# Patient Record
Sex: FEMALE | Race: WHITE | NOT HISPANIC OR LATINO | Employment: FULL TIME | URBAN - METROPOLITAN AREA
[De-identification: names, ages, dates, MRNs, and addresses within clinical notes are randomized per-mention and may not be internally consistent; named-entity substitution may affect disease eponyms.]

---

## 2017-06-15 DIAGNOSIS — Z12.31 ENCOUNTER FOR SCREENING MAMMOGRAM FOR MALIGNANT NEOPLASM OF BREAST: ICD-10-CM

## 2018-01-09 ENCOUNTER — OFFICE VISIT (OUTPATIENT)
Dept: URGENT CARE | Facility: CLINIC | Age: 53
End: 2018-01-09
Payer: COMMERCIAL

## 2018-01-09 ENCOUNTER — APPOINTMENT (OUTPATIENT)
Dept: RADIOLOGY | Facility: CLINIC | Age: 53
End: 2018-01-09
Payer: COMMERCIAL

## 2018-01-09 ENCOUNTER — TRANSCRIBE ORDERS (OUTPATIENT)
Dept: URGENT CARE | Facility: CLINIC | Age: 53
End: 2018-01-09

## 2018-01-09 DIAGNOSIS — M25.572 PAIN IN LEFT ANKLE: ICD-10-CM

## 2018-01-09 DIAGNOSIS — S99.912A INJURY OF LEFT ANKLE: ICD-10-CM

## 2018-01-09 DIAGNOSIS — S93.492A SPRAIN OF OTHER LIGAMENT OF LEFT ANKLE, INITIAL ENCOUNTER: ICD-10-CM

## 2018-01-09 PROCEDURE — 73610 X-RAY EXAM OF ANKLE: CPT

## 2018-01-09 PROCEDURE — 73630 X-RAY EXAM OF FOOT: CPT

## 2018-01-09 PROCEDURE — 99213 OFFICE O/P EST LOW 20 MIN: CPT

## 2018-01-09 PROCEDURE — 99214 OFFICE O/P EST MOD 30 MIN: CPT

## 2018-01-11 NOTE — PROGRESS NOTES
Assessment   1  Left ankle pain (871 93) (V19 412)    Plan   Injury of left ankle, initial encounter    · * XR ANKLE 3+ VIEW LEFT; Status:Active - Retrospective By Protocol Authorization; Requested TTY:[id number];    · * XR FOOT 3+ VIEW LEFT; Status:Active - Retrospective By Protocol Authorization; Requested PTH:[id number]; Discussion/Summary   Discussion Summary:    Unable to complete xray at this time due to computer issue will send to [facility]  Medication Side Effects Reviewed: Possible side effects of new medications were reviewed with the patient/guardian today  Understands and agrees with treatment plan: The treatment plan was reviewed with the patient/guardian  The patient/guardian understands and agrees with the treatment plan    Counseling Documentation With Imm: The patient was counseled regarding instructions for management,-- patient and family education,-- importance of compliance with treatment  total time of encounter was 25 minutes-- and-- 10 minutes was spent counseling  Follow Up Instructions: Follow Up with your Primary Care Provider in 3-5 days  If your symptoms worsen, go to the nearest [facility] Emergency Department  Chief Complaint   1  Ankle Pain  Chief Complaint Free Text Note Form: Left ankle pain swelling in bruising for 2 weeks hit off power wheel chair while at work has been icing mMotrin and elevating ice makes worse now using heat  History of Present Illness   HPI: 47year old female at urgent care with chief complaint of left ankle and left lateral foot pain for the past 2 weeks since hitting it on a power wheelchair  She has been using NSAID some relief obtained    Hospital Based Practices Required Assessment:      Pain Assessment      the patient states they have pain  The pain is located in the left ankle foot   The patient describes the pain as aching  (on a scale of 0 to 10, the patient rates the pain at 8 )      Abuse And Domestic Violence Screen       Yes, the patient is safe at home  -- The patient states no one is hurting them  Depression And Suicide Screen  No, the patient has not had thoughts of hurting themself  No, the patient has not felt depressed in the past 7 days  Prefered Language is  [place]  Primary Language is  English  Readiness To Learn: Receptive  Barriers To Learning: none  Preferred Learning: verbal      Education Completed: disease/condition,-- medications-- and-- further treatment/follow-up      Teaching Method: verbal      Person Taught: patient      Evaluation Of Learning: verbalized/demonstrated understanding    Ankle Pain: [facility]  presents with complaints of ankle pain  Associated symptoms include swelling,-- decreased range of motion,-- difficulty bearing weight-- and-- lateral foot pain, but-- no redness,-- no warmth,-- no localized bruising,-- no instability,-- no stiffness,-- no fever,-- no chills,-- no localized rash,-- no generalized rash-- and-- no pain in other joints  Review of Systems   Focused-Female:      Constitutional: No fever, no chills, feels well, no tiredness, no recent weight gain or loss  ENT: no ear ache, no loss of hearing, no nosebleeds or nasal discharge, no sore throat or hoarseness  Cardiovascular: no complaints of slow or fast heart rate, no chest pain, no palpitations, no leg claudication or lower extremity edema  Respiratory: no complaints of shortness of breath, no wheezing, no dyspnea on exertion, no orthopnea or PND  Breasts: no complaints of breast pain, breast lump or nipple discharge  Gastrointestinal: no complaints of abdominal pain, no constipation, no nausea or diarrhea, no vomiting, no bloody stools  Genitourinary: no complaints of dysuria, no incontinence, no pelvic pain, no dysmenorrhea, no vaginal discharge or abnormal vaginal bleeding        Musculoskeletal: no complaints of arthralgia, no myalgia, no joint swelling or stiffness, no limb pain or swelling  Integumentary: no complaints of skin rash or lesion, no itching or dry skin, no skin wounds  Neurological: no complaints of headache, no confusion, no numbness or tingling, no dizziness or fainting  ROS Reviewed:    ROS reviewed  Active Problems   1  Benign essential hypertension (401 1) (I10)   2  Conjunctivitis (372 30) (H10 9)   3  Degenerative arthritis of cervical spine (721 0) (M47 812)   4  Dog bite of hand, left, subsequent encounter (V58 89,882 0) (E21 501A,W99  0XXD)   5  Dog bite of right hand, subsequent encounter (V58 89,882 0) (O85 182H,K86  0XXD)   6  Dog bite, initial encounter (879 8,E906 0) (W54  0XXA)   7  Encounter for screening mammogram for breast cancer (V76 12) (Z12 31)   8  Fracture of right scapular body (811 09) (S42 111A)   9  Frequent headaches (784 0) (R51)   10  H/O: Bell's palsy (V12 49) (Z86 69)   11  Hyperesthesia (782 0) (R20 3)   12  Hyperglycemia (790 29) (R73 9)   13  Hypothyroidism (244 9) (E03 9)   14  Lung nodule (793 11) (R91 1)   15  Middle ear effusion, bilateral (381 4) (H65 93)   16  Migraine headache (346 90) (G43 909)   17  Multiple rib fractures (807 09) (S22 49XA)   18  Nasal congestion (478 19) (R09 81)   19  Obstructive sleep apnea (327 23) (G47 33)   20  Paresthesias (782 0) (R20 2)   21  Paroxysmal atrial fibrillation (427 31) (I48 0)   22  Recurrent epistaxis (784 7) (R04 0)   23  Visit for screening mammogram (V76 12) (Z12 31)    Past Medical History   1  History of Endometrial adenocarcinoma (182 0) (C54 1)   2  History of Endometriosis (617 9) (N80 9)   3  History of Facial twitching (351 8) (G51 4)   4  History of arthritis (V13 4) (Z87 39)   5  History of migraine (V12 49) (Z86 69)   6  History of thyroid disease (V12 29) (Z86 39)  Active Problems And Past Medical History Reviewed: The active problems and past medical history were reviewed and updated today        Family History   Father    1  Family history of Diabetes mellitus (250 00) (E11 9)   2  Family history of hypertension (V17 49) (Z82 49)  Family History    3  Family history of hypotension (V17 49) (Z82 49)  Family History Reviewed: The family history was reviewed and updated today  Social History    · Caffeine use (V49 89) (F15 90)   · Former smoker (E81 78) (E77 278)   ·    · No drug use   · Social alcohol use (Z78 9)   · Three children  Social History Reviewed: The social history was reviewed and updated today  The social history was reviewed and is unchanged  Surgical History   1  History of Ankle Surgery   2  History of Appendectomy   3  History of Cholecystotomy   4  History of Hysterectomy   5  History of Hysteroscopy   6  History of Oophorectomy  Surgical History Reviewed: The surgical history was reviewed and updated today  Current Meds    1  Calcium CAPS; Therapy: (Recorded:99Ejs6804) to Recorded   2  CoQ-10 200 MG CAPS; Take as directed Recorded   3  2016 [employer] CAPS Recorded   4  Magnesium CAPS; Therapy: (Recorded:15Lng3081) to Recorded   5  Multi-Vitamin Oral Tablet Recorded  Medication List Reviewed: The medication list was reviewed and updated today  Allergies   1  No Known Drug Allergies  2  Seasonal    Vitals   Signs   Recorded: 57EHD2950 06:01PM   Temperature: 98 7 F  Heart Rate: 76  Respiration: 20  O2 Saturation: 97  Pain Scale: 8    Physical Exam        Constitutional      General appearance: No acute distress, well appearing and well nourished  Eyes      Conjunctiva and lids: No swelling, erythema or discharge  Pupils and irises: Equal, round and reactive to light  Ears, Nose, Mouth, and Throat      External inspection of ears and nose: Normal        Oropharynx: Normal with no erythema, edema, exudate or lesions  Pulmonary      Respiratory effort: No increased work of breathing or signs of respiratory distress         Auscultation of lungs: Clear to auscultation  Cardiovascular      Palpation of heart: Normal PMI, no thrills  Auscultation of heart: Normal rate and rhythm, normal S1 and S2, without murmurs  Musculoskeletal      Gait and station: Normal        Digits and nails: Normal without clubbing or cyanosis  Inspection/palpation of joints, bones, and muscles: Abnormal   Appearance - normal  Palpation - normal except as noted: left ankle-- and-- left foot tenderness  Results/Data   Diagnostic Studies Reviewed: I personally reviewed the films/images/results in the office today  My interpretation follows  X-ray Review left foot and ankle xray        Signatures    Electronically signed by : Yemi Handy NP; Jan 9 2018  6:23PM EST                       (Author)     Electronically signed by : KRYSTA العراقي ; Colton 10 2018  8:52AM EST                       (Co-author)

## 2018-01-12 ENCOUNTER — GENERIC CONVERSION - ENCOUNTER (OUTPATIENT)
Dept: OTHER | Facility: OTHER | Age: 53
End: 2018-01-12

## 2018-01-12 NOTE — MISCELLANEOUS
Assessment    1  Multiple rib fractures (807 09) (S22 49XA)   2  Fracture of right scapular body (811 09) (S42 111A)   3  Lung nodule (793 11) (R91 1)   4  Degenerative arthritis of cervical spine (721 0) (A03 119)    Discussion/Summary  Discussion Summary:   Reviewed hospital records with her  Discussed with her the need for repeat CT of the chest in about 3 months to follow the lung nodule  Discussed with her the degenerative changes that were seen in the cervical spine  Discussed with her that there was some enlargement of the heart on the CT of the chest  Continue with the tramadol as needed  Continue with ibuprofen or other anti-inflammatory's as needed  Continue with the Lidoderm patches as needed  Discussed with her that she will likely have some significant pain at least for the next 3-4 weeks  Discussed with her that she may be limited from overworking standpoint for the next 1-2 months especially since she has relatively active job  Family medical leave forms will be completed for the patient  Discussed with her following up more regularly for her routine medical problems  Discussed with her setting up a routine visit and getting routine laboratory testing done after these acute issues have resolved  Discussed with her that the haziness that was seen on the CAT scan of the chest in her lung fields  She states that she has had low pulse oxes for many years especially when sleeping  Discussed with her further evaluation with regard to this  We'll set her up for recheck in about 6 weeks or sooner if needed  Discussed with her that she should also set up an appointment after that for a routine visit with laboratory testing  Advised her to watch for any worsening of her pain in the rib area or any increase in the shortness of breath  Discussed with her that if her pain or shortness of breath increased significantly, she should go immediately to the emergency room        Chief Complaint  Chief Complaint Free Text Note Form: Pt here for [facility] from [facility] for a fall from a horse  Pt states she is still having some pain but taking pain meds and using her patches  History of Present Illness  TCM Communication [facility]: The patient is being contacted for follow-up after hospitalization and 6/6/16  She was hospitalized at [facility]  The dates of hospitalization: 5/29/16-5/30/16, date of admission: 5/29/16, date of discharge: 5/30/16  Diagnosis: Closed FX of Multiple Ribs R Side Closed Nondisplaced FX of R scapula, Fall from horse  She was discharged to home  Medications reviewed and updated today  Follow-up appointments with other specialists: None  The patient is currently experiencing symptoms  pain r side of body Counseling was provided to the patient  Communication performed and completed by Krystal Hendricks   HPI: She presents for follow-up after recent hospitalization for observation after fall from her horse with multiple rib fractures and other injuries  She states that she was riding her horse and fell off landing on her right side  She landed on her right side slightly injuring her arm but hitting her rib cage and back area as well as hitting her head  No loss of consciousness  She noticed immediately that she could not take a deep breath and had significant pain into the chest wall area  She went to the emergency room and was evaluated for potential traumatic injuries  She had a CT of her chest as well as a CT of her neck  Chest CT showed right sided rib fractures which were nondisplaced at sixth, seventh and eighth ribs  These were nondisplaced and no underlying lung injury or pneumothorax was noticed  There was some haziness in the lung fields which was nonspecific per the radiology report  There was a 4 mm right middle lobe pulmonary nodule as well as mild cardiomegaly, diverticulosis, and small hiatal hernia  On the CT of her neck degenerative changes were found but no acute injuries   She was observed overnight and was discharged  Has been taking tramadol only as needed  Has been taking ibuprofen otherwise  Denies any significant shortness of breath  Is trying to limit her activity somewhat to prevent the pain  Denies any headaches  Denies any localized weakness  Has been applying Lidoderm patches to the area of the right rib fractures with some alleviation of her symptoms  On the CAT scan of her chest, fracture to the edge of the right scapula was also noted  Review of Systems  Complete-Female:   Constitutional: feeling tired  Cardiovascular: as noted in HPI  Respiratory: as noted in HPI  Gastrointestinal: as noted in HPI  Genitourinary: as noted in HPI and no dysuria  Musculoskeletal: as noted in HPI  Neurological: as noted in HPI  ROS Reviewed:   ROS reviewed  Active Problems    1  Benign essential hypertension (401 1) (I10)   2  Frequent headaches (784 0) (R51)   3  H/O: Bell's palsy (V12 49) (Z86 69)   4  Hyperesthesia (782 0) (R20 3)   5  Hyperglycemia (790 29) (R73 9)   6  Hypothyroidism (244 9) (E03 9)   7  Migraine headache (346 90) (G43 909)   8  Obstructive sleep apnea (327 23) (G47 33)   9  Paresthesias (782 0) (R20 2)   10  Paroxysmal atrial fibrillation (427 31) (I48 0)   11  Recurrent epistaxis (784 7) (R04 0)   12  Visit for screening mammogram (V76 12) (Z12 31)    Past Medical History    1  History of Endometrial adenocarcinoma (182 0) (C54 1)   2  History of Endometriosis (617 9) (N80 9)   3  History of Facial twitching (351 8) (G51 4)   4  History of arthritis (V13 4) (Z87 39)   5  History of migraine (V12 49) (Z86 69)   6  History of thyroid disease (V12 29) (Z86 39)    Surgical History    1  History of Ankle Surgery   2  History of Appendectomy   3  History of Cholecystotomy   4  History of Hysterectomy   5  History of Hysteroscopy  Surgical History Reviewed: The surgical history was reviewed and updated today  Family History  Father    1   Family history of Diabetes mellitus (250 00) (E11 9)   2  Family history of hypertension (V17 49) (Z82 49)  Family History    3  Family history of hypotension (V17 49) (Z82 49)  Family History Reviewed: The family history was reviewed and updated today  Social History    · Caffeine use (V49 89) (F15 90)   · Former smoker (F56 46) (V82 092)   ·    · No drug use   · Social alcohol use (Z78 9)   · Three children  Social History Reviewed: The social history was reviewed and updated today  Current Meds   1  Calcium CAPS; Therapy: (Recorded:16Dqt3166) to Recorded   2  CoQ-10 200 MG Oral Capsule; Take as directed Recorded   3  Cyclobenzaprine HCl - 10 MG Oral Tablet; Take 1 tablet 3 times a day as needed for   muscle spasms for up to 30 days; Therapy: (Recorded:14Mgs2280) to Recorded   4   MG TABS; TAKE 1 TABLET EVERY 6 HOURS as needed for mild pain up to 10   days; Therapy: (Recorded:15Abo8621) to Recorded   5 [address] CAPS Recorded   6  Lidocaine 5 % External Patch; Place 2 patches on the skin daily for 30 days; Therapy: (Recorded:62Ous9338) to Recorded   7  Magnesium CAPS; Therapy: (Recorded:14Iqk6243) to Recorded   8  Multi-Vitamin Oral Tablet Recorded   9  TraMADol HCl - 50 MG Oral Tablet; TAKE 2 TABLETS 4 TIMES DAILY; Therapy: (Recorded:30Vex2644) to Recorded  Medication List Reviewed: The medication list was reviewed and updated today  Allergies    1  No Known Drug Allergies    2   Seasonal    Vitals  Vitals [Data Includes: All]   Recorded: 41WCU0411 14:78QB   Systolic: 236  Diastolic: 88  Heart Rate: 88  Respiration: 16  Height: 5 ft 6 in  Weight: 259 lb   BMI Calculated: 41 8  BSA Calculated: 2 23  Pain Scale: 5  Recorded: 31ZFC3326 46:93AD   Systolic: 990  Diastolic: 82  Heart Rate: 66  Respiration: 16  Height: 5 ft 6 in  Weight: 266 lb   BMI Calculated: 42 93  BSA Calculated: 2 26  Pain Scale: 0  Recorded: 61ZRO9767 35:94ZQ   Systolic: 007, RUE, Sitting  Diastolic: 93, RUE, Sitting  Heart Rate: 55  Height: 5 ft 6 in  Weight: 273 lb   BMI Calculated: 44 06  BSA Calculated: 2 28  Recorded: 54QGO1244 30:21BV   Systolic: 110  Diastolic: 94  Heart Rate: 60  Respiration: 18  Height: 5 ft 6 in  Weight: 278 lb   BMI Calculated: 44 87  BSA Calculated: 2 31  Pain Scale: 2    Physical Exam    Constitutional   General appearance: Abnormal   appears tired and well hydrated  Ears, Nose, Mouth, and Throat   External inspection of ears and nose: Normal     Otoscopic examination: Tympanic membranes translucent with normal light reflex  Canals patent without erythema  Oropharynx: Normal with no erythema, edema, exudate or lesions  Pulmonary   Auscultation of lungs: Clear to auscultation  Cardiovascular   Palpation of heart: Abnormal   tenderness over right lateral rib area;  Auscultation of heart: Normal rate and rhythm, normal S1 and S2, without murmurs  Lymphatic   Palpation of lymph nodes in neck: No lymphadenopathy  Musculoskeletal   Gait and station: Normal     Inspection/palpation of joints, bones, and muscles: Abnormal   tenderness over the right upper back area  Psychiatric   Mood and affect: Normal          Future Appointments    Date/Time Provider Specialty Site   07/13/2016 10:00 AM Velma Spurling, M D [address]   10/04/2016 11:00 AM Velma Spurling, M D [address]     Signatures   Electronically signed by : SIRISHA Henry ; Jun 7 2016  4:23AM EST                       (Author)

## 2018-01-13 NOTE — PROGRESS NOTES
Assessment   1  Injury of left ankle, initial encounter (959 7) (K05 219A)    Plan    * XR ANKLE 3+ VIEW LEFT; Status:Canceled - Retrospective By Protocol Authorization;      Perform:[facility]; GKS:[id number]; Last Updated By:Niesha Contreras; 1/10/2018 8:45:13 AM;Ordered; Christian Sebastians;       For:Injury of left ankle, initial encounter; Ordered By:Natalia Lew;      * XR FOOT 3+ VIEW LEFT; Status:Canceled - Retrospective By Protocol Authorization;      Perform:[facility]; LAQ:[id number]; Last Updated By:Niesha Conterras; 1/10/2018 8:45:14 AM;Ordered; Christian Connors;       For:Injury of left ankle, initial encounter; Ordered By:Natalia Lew;      * XR ANKLE 3+ VIEW LEFT; Status:Resulted - Requires Verification,Retrospective By Protocol Authorization;   Done: [id number] 12:00AM     Due:10Jan2018; Last Updated By:Behler, Alois Acron; 1/10/2018 8:43:59 AM;Ordered; Christian Connors;       For:Left ankle pain; Ordered By:Behler, Alois Acron;      * XR FOOT 3+ VIEW LEFT; Status:Resulted - Requires Verification,Retrospective By Protocol Authorization;   Done: [id number] 12:00AM     Due:10Jan2018; Last Updated By:Behler, Alois Acron; 1/10/2018 8:45:10 AM;Ordered; Stat;       For:Left ankle pain; Ordered By:Behler, Alois Acron; Discussion/Summary   Discussion Summary:    X-ray reveals no new fracture  Will follow-up with radiologist report when available  Recommend air cast for support on the size of her ankle  Patient should follow up with Orthopedics in the next week  Medication Side Effects Reviewed: Possible side effects of new medications were reviewed with the patient/guardian today  Understands and agrees with treatment plan: The treatment plan was reviewed with the patient/guardian  The patient/guardian understands and agrees with the treatment plan      Chief Complaint   1  Ankle Pain  Chief Complaint Free Text Note Form: Pt c/o left ankle pain for two weeks  Pt reports hitting it off of a wheel chair        History of Present Illness HPI: 50-year-old female here with chief complaint left ankle and lateral foot pain  Patient states this started 2 weeks ago after hitting off the wheelchair at work  She has been taking ibuprofen and icing it but does not feel like it is helping  She is having difficulty ambulating  Patient has a history of ORIF of the left ankle and has hardware in there and she is concerned that she may have loosened screw  Review of Systems   Focused-Female:      Constitutional: No fever, no chills, feels well, no tiredness, no recent weight gain or loss  Cardiovascular: no complaints of slow or fast heart rate, no chest pain, no palpitations, no leg claudication or lower extremity edema  Respiratory: no complaints of shortness of breath, no wheezing, no dyspnea on exertion, no orthopnea or PND  Musculoskeletal: as noted in HPI  ROS Reviewed:    ROS reviewed  Active Problems   1  Benign essential hypertension (401 1) (I10)   2  Conjunctivitis (372 30) (H10 9)   3  Degenerative arthritis of cervical spine (721 0) (M47 812)   4  Dog bite of hand, left, subsequent encounter (V58 89,882 0) (B51 926X,H99  0XXD)   5  Dog bite of right hand, subsequent encounter (V58 89,882 0) (N41 242T,H21  0XXD)   6  Dog bite, initial encounter (879 8,E906 0) (W54  0XXA)   7  Encounter for screening mammogram for breast cancer (V76 12) (Z12 31)   8  Fracture of right scapular body (811 09) (S42 111A)   9  Frequent headaches (784 0) (R51)   10  H/O: Bell's palsy (V12 49) (Z86 69)   11  Hyperesthesia (782 0) (R20 3)   12  Hyperglycemia (790 29) (R73 9)   13  Hypothyroidism (244 9) (E03 9)   14  Injury of left ankle, initial encounter (959 7) (S99 912A)   15  Left ankle pain (719 47) (M25 572)   16  Lung nodule (793 11) (R91 1)   17  Middle ear effusion, bilateral (381 4) (H65 93)   18  Migraine headache (346 90) (G43 909)   19  Multiple rib fractures (807 09) (S22 49XA)   20  Nasal congestion (478 19) (D07 81)   21  Obstructive sleep apnea (327 23) (G47 33)   22  Paresthesias (782 0) (R20 2)   23  Paroxysmal atrial fibrillation (427 31) (I48 0)   24  Recurrent epistaxis (784 7) (R04 0)   25  Visit for screening mammogram (V76 12) (Z12 31)    Past Medical History   1  History of Endometrial adenocarcinoma (182 0) (C54 1)   2  History of Endometriosis (617 9) (N80 9)   3  History of Facial twitching (351 8) (G51 4)   4  History of arthritis (V13 4) (Z87 39)   5  History of migraine (V12 49) (Z86 69)   6  History of thyroid disease (V12 29) (Z86 39)  Active Problems And Past Medical History Reviewed: The active problems and past medical history were reviewed and updated today  Family History   Father    1  Family history of Diabetes mellitus (250 00) (E11 9)   2  Family history of hypertension (V17 49) (Z82 49)  Family History    3  Family history of hypotension (V17 49) (Z82 49)  Family History Reviewed: The family history was reviewed and updated today  Social History    · Caffeine use (V49 89) (F15 90)   · Former smoker (M03 98) (L48 954)   ·    · No drug use   · Social alcohol use (Z78 9)   · Three children  Social History Reviewed: The social history was reviewed and updated today  The social history was reviewed and is unchanged  Surgical History   1  History of Ankle Surgery   2  History of Appendectomy   3  History of Cholecystotomy   4  History of Hysterectomy   5  History of Hysteroscopy   6  History of Oophorectomy  Surgical History Reviewed: The surgical history was reviewed and updated today  Current Meds    1  Calcium CAPS; Therapy: (Recorded:[id number]) to Recorded   2  CoQ-10 200 MG CAPS; Take as directed Recorded   3  [address] CAPS Recorded   4  Magnesium CAPS; Therapy: (Recorded:[id number]) to Recorded   5  Multi-Vitamin Oral Tablet Recorded  Medication List Reviewed: The medication list was reviewed and updated today  Allergies   1  No Known Drug Allergies  2  Seasonal    Physical Exam        Constitutional      General appearance: No acute distress, well appearing and well nourished  Pulmonary      Respiratory effort: No increased work of breathing or signs of respiratory distress  Auscultation of lungs: Clear to auscultation  Cardiovascular      Auscultation of heart: Normal rate and rhythm, normal S1 and S2, without murmurs  Musculoskeletal      Gait and station: Abnormal   Gait evaluation demonstrated antalgia on the left  -- Left ankle with limited range of motion in all planes  Tender to palpation along medial aspect and lateral aspect of ankle  Also tender along the lateral aspect of left foot        Signatures    Electronically signed by : HUMZA Cantrell; Colton 10 2018 10:06AM EST                       (Author)     Electronically signed by : KRYSTA Antoine Sa ; Jan 12 2018  8:53AM EST                       (Co-author)

## 2018-01-14 NOTE — MISCELLANEOUS
Message  Return to work or school:   Karen Westfall is under my professional care  She was seen in my office on 3/8/16       Pt was unable to work 03/02/2016-03/07/20216  Excuse for 3/2/16, 3/3/16, 3/4/16, and 3/7/16 due to illness        Future Appointments    Signatures   Electronically signed by : SIRISHA Spear ; Mar  8 2016 10:25AM EST                       (Author)    Electronically signed by : SIRISHA Spear ; Mar  8 2016 11:53AM EST                       (Author)    Electronically signed by : SIRISHA Spear ; Mar  9 2016  5:14AM EST                       (Author)

## 2018-01-23 VITALS — OXYGEN SATURATION: 97 % | RESPIRATION RATE: 20 BRPM | HEART RATE: 76 BPM | TEMPERATURE: 98.7 F

## 2018-01-24 VITALS
WEIGHT: 272 LBS | HEIGHT: 67 IN | DIASTOLIC BLOOD PRESSURE: 93 MMHG | RESPIRATION RATE: 16 BRPM | BODY MASS INDEX: 42.69 KG/M2 | SYSTOLIC BLOOD PRESSURE: 127 MMHG | HEART RATE: 87 BPM

## 2018-02-23 ENCOUNTER — OFFICE VISIT (OUTPATIENT)
Dept: URGENT CARE | Facility: CLINIC | Age: 53
End: 2018-02-23
Payer: COMMERCIAL

## 2018-02-23 VITALS
DIASTOLIC BLOOD PRESSURE: 83 MMHG | TEMPERATURE: 97.5 F | OXYGEN SATURATION: 97 % | HEART RATE: 74 BPM | SYSTOLIC BLOOD PRESSURE: 134 MMHG

## 2018-02-23 DIAGNOSIS — J01.10 ACUTE FRONTAL SINUSITIS, RECURRENCE NOT SPECIFIED: Primary | ICD-10-CM

## 2018-02-23 PROCEDURE — 99213 OFFICE O/P EST LOW 20 MIN: CPT | Performed by: NURSE PRACTITIONER

## 2018-02-23 RX ORDER — AMOXICILLIN AND CLAVULANATE POTASSIUM 875; 125 MG/1; MG/1
1 TABLET, FILM COATED ORAL EVERY 12 HOURS SCHEDULED
Qty: 20 TABLET | Refills: 0 | Status: SHIPPED | OUTPATIENT
Start: 2018-02-23 | End: 2018-03-05

## 2018-02-23 NOTE — PATIENT INSTRUCTIONS
I have prescribed an antibiotic for the infection  Please take the antibiotic as prescribed and finish the entire prescription  I recommend that the patient takes an over the counter probiotic or eats yogurt with live cultures in it [place]) to keep good bacteria in the gut and help prevent diarrhea  Wash hands frequently to prevent the spread of infection  Can use over the counter cough and cold medications to help with symptoms  Ibuprofen and/or tylenol as needed for pain or fever  If not improving over the next 7-10 days, follow up with PCP  Rhinosinusitis   AMBULATORY CARE:   Rhinosinusitis (RS)  is inflammation of your nose and sinuses  It commonly begins as a virus, often as a common cold  Viruses usually last 7 to 10 days and do not need treatment  When the virus does not get better on its own, you may have bacterial RS  This means that bacteria have begun to grow inside your sinuses  Acute RS lasts less than 4 weeks  Chronic RS lasts 12 weeks or more  Recurrent RS is when you have 4 or more episodes of RS in one year  Your signs and symptoms  may be worse when you lie on your back or try to sleep  You may have any of the following:  · Stuffy nose and reduced sense of smell     · Runny nose with thick yellow or green mucus     · Pressure or pain on your face or a headache     · Pain in your teeth or bad breath     · Ear pain or pressure     · Fever or cough     · Tiredness  Seek care immediately if:   · You have double vision or you cannot see  · You have a stiff neck, a fever, or a bad headache  · Your eyeball bulges out or you cannot move your eye  · Your eye and eyelid are red, swollen, and painful  · You cannot open your eye  · You are more sleepy than normal, or you notice changes in your ability to think, move, or talk  · You have swelling of your forehead or scalp    Contact your healthcare provider if:   · Your symptoms are worse or do not improve after 3 to 5 days of treatment  · You have questions or concerns about your condition or care  Treatment for rhinosinusitis  may include any of the following:  · Acetaminophen  decreases pain and fever  It is available without a doctor's order  Ask how much to take and how often to take it  Follow directions  Acetaminophen can cause liver damage if not taken correctly  · NSAIDs , such as ibuprofen, help decrease swelling, pain, and fever  This medicine is available with or without a doctor's order  NSAIDs can cause stomach bleeding or kidney problems in certain people  If you take blood thinner medicine, always ask your healthcare provider if NSAIDs are safe for you  Always read the medicine label and follow directions  · Nasal steroid sprays  decrease inflammation in your nose and sinuses  · Decongestants  reduce swelling and drain mucus in the nose and sinuses  They may help you breathe easier  · Antihistamines  dry mucus in the nose and relieve sneezing  · Antibiotics  treat a bacterial infection and may be needed if your symptoms do not improve or they get worse  · Take your medicine as directed  Contact your healthcare provider if you think your medicine is not helping or if you have side effects  Tell him or her if you are allergic to any medicine  Keep a list of the medicines, vitamins, and herbs you take  Include the amounts, and when and why you take them  Bring the list or the pill bottles to follow-up visits  Carry your medicine list with you in case of an emergency  Self-care:   · Rinse your sinuses  Use a sinus rinse device to rinse your nasal passages with a saline (salt water) solution  This will help thin the mucus in your nose and rinse away pollen and dirt  It will also help reduce swelling so you can breathe normally  Ask your healthcare provider how often to do this       · Breathe in steam   Heat a bowl of water until you see steam  Lean over the bowl and make a tent over your head with a large towel  Breathe deeply for about 20 minutes  Be careful not to get too close to the steam or burn yourself  Do this 3 times a day  You can also breathe deeply when you take a hot shower  · Sleep with your head elevated  Place an extra pillow under your head before you go to sleep to help your sinuses drain  · Drink liquids as directed  Ask your healthcare provider how much liquid to drink each day and which liquids are best for you  Liquids will thin the mucus in your nose and help it drain  Avoid drinks that contain alcohol or caffeine  · Do not smoke, and avoid secondhand smoke  Nicotine and other chemicals in cigarettes and cigars can make your symptoms worse  Ask your healthcare provider for information if you currently smoke and need help to quit  E-cigarettes or smokeless tobacco still contain nicotine  Talk to your healthcare provider before you use these products  Follow up with your healthcare provider as directed: Follow up if your symptoms are worse or not better after 3 to 5 days of treatment  Write down your questions so you remember to ask them during your visits  © 2017 [facility] Information is for End User's use only and may not be sold, redistributed or otherwise used for commercial purposes  All illustrations and images included in CareNotes® are the copyrighted property of A D A M , Inc  or Luis Quinonez  The above information is an  only  It is not intended as medical advice for individual conditions or treatments  Talk to your doctor, nurse or pharmacist before following any medical regimen to see if it is safe and effective for you

## 2018-02-23 NOTE — PROGRESS NOTES
[facility]        NAME: Thuy Judge is a 47 y o  female  : 1965    MRN: [id number]  DATE: 2018  TIME: 10:49 AM    Assessment and Plan   Acute frontal sinusitis, recurrence not specified [J01 10]  1  Acute frontal sinusitis, recurrence not specified           Patient Instructions     Follow up with PCP in 3-5 days  Proceed to  ER if symptoms worsen  I have prescribed an antibiotic for the infection  Please take the antibiotic as prescribed and finish the entire prescription  I recommend that the patient takes an over the counter probiotic or eats yogurt with live cultures in it [place]) to keep good bacteria in the gut and help prevent diarrhea  Wash hands frequently to prevent the spread of infection  Can use over the counter cough and cold medications to help with symptoms  Ibuprofen and/or tylenol as needed for pain or fever  If not improving over the next 7-10 days, follow up with PCP  Chief Complaint     Chief Complaint   Patient presents with    Cold Like Symptoms     sinus and left ear pain, "My  was diagnosed with influenza B"         History of Present Illness       50-year-old female at urgent care with chief complaint of nasal congestion sinus pressure and left-sided ear pain for the past 4 days denies any fevers chills or cough she has not used any over-the-counter medications or reports no improvement in symptoms        Review of Systems   Review of Systems   Constitutional: Negative  HENT: Positive for congestion, postnasal drip, rhinorrhea and sinus pressure  Negative for dental problem, drooling, ear discharge, ear pain, facial swelling, hearing loss, mouth sores, nosebleeds, sinus pain, sneezing, sore throat, tinnitus, trouble swallowing and voice change  Eyes: Negative  Respiratory: Negative for apnea, cough, choking, chest tightness, shortness of breath, wheezing and stridor      Cardiovascular: Negative for chest pain, palpitations and leg swelling  Gastrointestinal: Negative  Negative for abdominal distention, abdominal pain, anal bleeding, blood in stool, constipation, diarrhea, nausea, rectal pain and vomiting  Endocrine: Negative  Genitourinary: Negative  Musculoskeletal: Negative  Skin: Negative  Allergic/Immunologic: Negative  Neurological: Negative  Hematological: Negative  Psychiatric/Behavioral: Negative  Current Medications       Current Outpatient Prescriptions:     KRILL OIL PO, Take 1 tablet by mouth daily, Disp: , Rfl:     Multiple Vitamin (MULTIVITAMIN) tablet, Take 1 tablet by mouth daily, Disp: , Rfl:     saccharomyces boulardii (FLORASTOR) 250 mg capsule, Take 250 mg by mouth 2 (two) times a day , Disp: , Rfl:     ofloxacin (OCUFLOX) 0 3 % ophthalmic solution, Day 1-2: 1 drop to left eye q30 min when awake  Day 3-7: 1 drop to left eye q1h when awake  Day 8-10: 1 drop to affected eye q4h when awake , Disp: 10 mL, Rfl: 0    Omega-3 Fatty Acids (FISH OIL) 1,000 mg, Take 1,000 mg by mouth daily  , Disp: , Rfl:     Prenatal Vit-Fe Fumarate-FA (M-VIT PO), Take 1 tablet by mouth daily  , Disp: , Rfl:     tobramycin (TOBREX) 0 3 % SOLN, 1 drop every 4 (four) hours while awake , Disp: , Rfl:     Vitamin D, Cholecalciferol, 1000 UNITS TABS, Take by mouth, Disp: , Rfl:     Current Allergies     Allergies as of 02/23/2018    (No Known Allergies)            The following portions of the patient's history were reviewed and updated as appropriate: allergies, current medications, past family history, past medical history, past social history, past surgical history and problem list          Objective   /83   Pulse 74   Temp 97 5 °F (36 4 °C)   SpO2 97%        Physical Exam     Physical Exam   Constitutional: She is oriented to person, place, and time  Vital signs are normal  She appears well-developed and well-nourished  HENT:   Head: Normocephalic and atraumatic     Right Ear: Hearing, tympanic membrane, external ear and ear canal normal    Left Ear: Hearing, tympanic membrane, external ear and ear canal normal    Nose: Rhinorrhea and sinus tenderness present  Right sinus exhibits frontal sinus tenderness  Left sinus exhibits frontal sinus tenderness  Mouth/Throat: Uvula is midline and mucous membranes are normal  Posterior oropharyngeal erythema present  Eyes: Conjunctivae and EOM are normal  Pupils are equal, round, and reactive to light  Neck: Trachea normal, normal range of motion and full passive range of motion without pain  Cardiovascular: Normal rate and regular rhythm  Pulmonary/Chest: Effort normal and breath sounds normal    Abdominal: Soft  Normal appearance  Musculoskeletal: Normal range of motion  Lymphadenopathy:     She has cervical adenopathy  Right cervical: Superficial cervical adenopathy present  Left cervical: Superficial cervical adenopathy present  Neurological: She is alert and oriented to person, place, and time

## 2018-06-17 ENCOUNTER — OFFICE VISIT (OUTPATIENT)
Dept: URGENT CARE | Facility: CLINIC | Age: 53
End: 2018-06-17
Payer: COMMERCIAL

## 2018-06-17 VITALS
SYSTOLIC BLOOD PRESSURE: 150 MMHG | HEART RATE: 82 BPM | TEMPERATURE: 99.2 F | OXYGEN SATURATION: 95 % | DIASTOLIC BLOOD PRESSURE: 76 MMHG | RESPIRATION RATE: 18 BRPM

## 2018-06-17 DIAGNOSIS — R10.9 FLANK PAIN: Primary | ICD-10-CM

## 2018-06-17 LAB
SL AMB  POCT GLUCOSE, UA: NORMAL
SL AMB LEUKOCYTE ESTERASE,UA: NORMAL
SL AMB POCT BILIRUBIN,UA: NORMAL
SL AMB POCT BLOOD,UA: NORMAL
SL AMB POCT CLARITY,UA: CLEAR
SL AMB POCT COLOR,UA: YELLOW
SL AMB POCT KETONES,UA: NORMAL
SL AMB POCT NITRITE,UA: NORMAL
SL AMB POCT PH,UA: 6.5
SL AMB POCT SPECIFIC GRAVITY,UA: 1.01
SL AMB POCT URINE PROTEIN: NORMAL
SL AMB POCT UROBILINOGEN: 0.2

## 2018-06-17 PROCEDURE — 99213 OFFICE O/P EST LOW 20 MIN: CPT | Performed by: PHYSICIAN ASSISTANT

## 2018-06-17 RX ORDER — ONDANSETRON 4 MG/1
4 TABLET, ORALLY DISINTEGRATING ORAL ONCE
Status: COMPLETED | OUTPATIENT
Start: 2018-06-17 | End: 2018-06-17

## 2018-06-17 RX ADMIN — ONDANSETRON 4 MG: 4 TABLET, ORALLY DISINTEGRATING ORAL at 12:36

## 2018-06-17 NOTE — PATIENT INSTRUCTIONS
Recommend patient go to the emergency room for further evaluation and workup of right flank pain  Will give her Zofran here today  She is going to go to [employer]  Need to rule out kidney stone or hydronephrosis  No blood in the urine on dip

## 2018-06-17 NOTE — PROGRESS NOTES
[facility]    NAME: Clint Frye is a 47 y o  female  : 1965    MRN: [id number]  DATE: 2018  TIME: 12:22 PM    Assessment and Plan   Flank pain [R10 9]  1  Flank pain  POCT urine dip    ondansetron (ZOFRAN-ODT) dispersible tablet 4 mg       Patient Instructions     Patient Instructions   Recommend patient go to the emergency room for further evaluation and workup of right flank pain  Will give her Zofran here today  She is going to go to [facility]  [employer]  Need to rule out kidney stone or hydronephrosis  No blood in the urine on dip  Chief Complaint     Chief Complaint   Patient presents with    Vomiting     Pt c/o vomiting and lower back pain since last night  History of Present Illness   A 40-year-old female here with complaint of right flank pain that started last night  Patient also has a low-grade temp and has been vomiting  She is unable to keep anything and feels very nauseous  No diarrhea  Pain in her right flank  Radiates around her side into her right groin area  Denies any blood in her urine  No dysuria  No urinary frequency or hesitancy  Patient has had the kidney stones before and states that she feels the same which she did last time  Review of Systems   Review of Systems   Constitutional: Positive for fever  Negative for activity change, appetite change, chills, diaphoresis, fatigue and unexpected weight change  HENT: Negative for congestion, dental problem, hearing loss, sinus pressure, sneezing, sore throat, tinnitus, trouble swallowing and voice change  Eyes: Negative for photophobia, redness and visual disturbance  Respiratory: Negative for apnea, cough, chest tightness, shortness of breath, wheezing and stridor  Cardiovascular: Negative for chest pain, palpitations and leg swelling  Gastrointestinal: Positive for nausea and vomiting   Negative for abdominal distention, abdominal pain, blood in stool, constipation and diarrhea  Endocrine: Negative for cold intolerance, heat intolerance, polydipsia, polyphagia and polyuria  Genitourinary: Positive for flank pain  Negative for difficulty urinating, dysuria, frequency, hematuria and urgency  Musculoskeletal: Negative for arthralgias, back pain, gait problem, joint swelling, myalgias, neck pain and neck stiffness  Skin: Negative for pallor, rash and wound  Neurological: Negative for dizziness, tremors, seizures, speech difficulty, weakness and headaches  Hematological: Negative for adenopathy  Does not bruise/bleed easily  Psychiatric/Behavioral: Negative for agitation, confusion, dysphoric mood and sleep disturbance  The patient is not nervous/anxious  All other systems reviewed and are negative  Current Medications     Current Outpatient Prescriptions:     KRILL OIL PO, Take 1 tablet by mouth daily, Disp: , Rfl:     Multiple Vitamin (MULTIVITAMIN) tablet, Take 1 tablet by mouth daily, Disp: , Rfl:     ofloxacin (OCUFLOX) 0 3 % ophthalmic solution, Day 1-2: 1 drop to left eye q30 min when awake  Day 3-7: 1 drop to left eye q1h when awake  Day 8-10: 1 drop to affected eye q4h when awake , Disp: 10 mL, Rfl: 0    Omega-3 Fatty Acids (FISH OIL) 1,000 mg, Take 1,000 mg by mouth daily  , Disp: , Rfl:     Prenatal Vit-Fe Fumarate-FA (M-VIT PO), Take 1 tablet by mouth daily  , Disp: , Rfl:     saccharomyces boulardii (FLORASTOR) 250 mg capsule, Take 250 mg by mouth 2 (two) times a day , Disp: , Rfl:     tobramycin (TOBREX) 0 3 % SOLN, 1 drop every 4 (four) hours while awake , Disp: , Rfl:     Vitamin D, Cholecalciferol, 1000 UNITS TABS, Take by mouth, Disp: , Rfl:     Current Facility-Administered Medications:     ondansetron (ZOFRAN-ODT) dispersible tablet 4 mg, 4 mg, Oral, Once, Betty James PA-C    Current Allergies     Allergies as of 06/17/2018    (No Known Allergies)          The following portions of the patient's history were reviewed and updated as appropriate: allergies, current medications, past family history, past medical history, past social history, past surgical history and problem list    Past Medical History:   Diagnosis Date    Hypertension      Past Surgical History:   Procedure Laterality Date    ANKLE FRACTURE SURGERY Left     ANKLE SURGERY      APPENDECTOMY      CHOLECYSTECTOMY      HYSTERECTOMY       Family History   Problem Relation Age of Onset    Clotting disorder Sister         possible, history of multiple blood clots     Medications have been verified  Objective   /76   Pulse 82   Temp 99 2 °F (37 3 °C)   Resp 18   SpO2 95%      Physical Exam   Physical Exam   Constitutional: She appears well-developed and well-nourished  No distress  HENT:   Head: Normocephalic  Right Ear: External ear normal    Left Ear: External ear normal    Nose: Nose normal    Mouth/Throat: Oropharynx is clear and moist  No oropharyngeal exudate  Neck: Normal range of motion  Neck supple  Cardiovascular: Normal rate, regular rhythm and normal heart sounds  No murmur heard  Pulmonary/Chest: Effort normal and breath sounds normal  No respiratory distress  She has no wheezes  She has no rales  Abdominal: Soft  Bowel sounds are normal  There is no tenderness  There is CVA tenderness (Right)  Musculoskeletal: Normal range of motion  Lymphadenopathy:     She has no cervical adenopathy  Skin: Skin is warm  No rash noted

## 2018-06-20 ENCOUNTER — OFFICE VISIT (OUTPATIENT)
Dept: INTERNAL MEDICINE CLINIC | Facility: CLINIC | Age: 53
End: 2018-06-20
Payer: COMMERCIAL

## 2018-06-20 VITALS
TEMPERATURE: 98.7 F | OXYGEN SATURATION: 96 % | WEIGHT: 278.3 LBS | BODY MASS INDEX: 43.59 KG/M2 | HEART RATE: 69 BPM | DIASTOLIC BLOOD PRESSURE: 88 MMHG | SYSTOLIC BLOOD PRESSURE: 130 MMHG

## 2018-06-20 DIAGNOSIS — R10.9 FLANK PAIN: Primary | ICD-10-CM

## 2018-06-20 DIAGNOSIS — M47.892 OTHER OSTEOARTHRITIS OF SPINE, CERVICAL REGION: ICD-10-CM

## 2018-06-20 DIAGNOSIS — M54.50 CHRONIC MIDLINE LOW BACK PAIN WITHOUT SCIATICA: ICD-10-CM

## 2018-06-20 DIAGNOSIS — G89.29 CHRONIC MIDLINE LOW BACK PAIN WITHOUT SCIATICA: ICD-10-CM

## 2018-06-20 PROCEDURE — 1036F TOBACCO NON-USER: CPT | Performed by: NURSE PRACTITIONER

## 2018-06-20 PROCEDURE — 99214 OFFICE O/P EST MOD 30 MIN: CPT | Performed by: NURSE PRACTITIONER

## 2018-06-20 RX ORDER — METHYLPREDNISOLONE 4 MG/1
TABLET ORAL
Qty: 21 TABLET | Refills: 0 | Status: SHIPPED | OUTPATIENT
Start: 2018-06-20 | End: 2019-05-07 | Stop reason: ALTCHOICE

## 2018-06-20 NOTE — PROGRESS NOTES
Assessment/Plan: Patient at this time is doing well and is offering no complaints of flank pain  She would like something called in for her lower back and was sent home on Tramadol and Motrin from the ER but states this is not helping her symptoms  She was called in a Medrol dose pack  She was advised to make an appointment for a routine physical exam since her last visit was in 2016  She states she will come in her week on vacation  She was instructed to continue to drink plenty of fluids  Will follow up then  No problem-specific Assessment & Plan notes found for this encounter  Problem List Items Addressed This Visit     Degenerative arthritis of cervical spine    Flank pain - Primary    Chronic midline low back pain without sciatica    Relevant Medications    Methylprednisolone 4 MG TBPK            Subjective:      Patient ID: Schuyler Sparrow is a 47 y o  female  Roxana Sheffield is here today for a UC follow up  She was seen in the UC on 6/17 for right flank pain  She was told to go to the ER for a further work up  She did have a CT scan done in the ER and a US which was all negative and she was told she most likely passed a stone  She denies any fever or flank pain but is having lower midline back pain  She is asking for either medrol or Voltaren for her lower back  She was going to PT and did have multiple XRs done showing DDD  She denies any dysuria or hematuria  She is having a cough as well for over two months and states she can not afford at this time to go for an XR  She states she is taking OTC Mucinex and allergy medications  She does not want anything else for allergies or cough at this time  She offers no other complaints at this time  The following portions of the patient's history were reviewed and updated as appropriate:   She  has a past medical history of Arthritis; Endometrial adenocarcinoma ([address] ); Endometriosis;  Facial twitching; Hypertension; Migraine; and Thyroid disease  She   Patient Active Problem List    Diagnosis Date Noted    Flank pain 06/20/2018    Chronic midline low back pain without sciatica 06/20/2018    Nasal congestion 07/06/2016    Degenerative arthritis of cervical spine 06/06/2016    Lung nodule 06/06/2016    Multiple rib fractures 06/06/2016    Closed fracture of multiple ribs of right side 05/29/2016    Closed nondisplaced fracture of right scapula 05/29/2016    Fall from horse 05/29/2016    Essential hypertension 05/29/2016    Obesity due to excess calories 05/29/2016    Pulmonary nodule 05/29/2016    Hyperesthesia 07/30/2015    Migraine headache 07/30/2015    Obstructive sleep apnea 07/30/2015    Benign essential hypertension 10/28/2014    Frequent headaches 10/28/2014    Hypothyroidism 10/28/2014    Paresthesias 10/28/2014    Paroxysmal atrial fibrillation ([facility] [address] ) 10/28/2014    Recurrent epistaxis 10/28/2014     She  has a past surgical history that includes Ankle fracture surgery (Left); Hysterectomy; Cholecystectomy; Appendectomy; Ankle surgery; Hysteroscopy; and Oophorectomy  Her family history includes Clotting disorder in her sister; Diabetes in her father; Hypertension in her father; Hypotension in her family  She  reports that she has never smoked  She has never used smokeless tobacco  She reports that she drinks alcohol  She reports that she does not use drugs  Current Outpatient Prescriptions   Medication Sig Dispense Refill    KRILL OIL PO Take 1 tablet by mouth daily      Multiple Vitamin (MULTIVITAMIN) tablet Take 1 tablet by mouth daily      ofloxacin (OCUFLOX) 0 3 % ophthalmic solution Day 1-2: 1 drop to left eye q30 min when awake  Day 3-7: 1 drop to left eye q1h when awake  Day 8-10: 1 drop to affected eye q4h when awake  10 mL 0    Omega-3 Fatty Acids (FISH OIL) 1,000 mg Take 1,000 mg by mouth daily   tobramycin (TOBREX) 0 3 % SOLN 1 drop every 4 (four) hours while awake        Vitamin D, Cholecalciferol, 1000 UNITS TABS Take by mouth      Methylprednisolone 4 MG TBPK Use as directed on package 21 tablet 0     No current facility-administered medications for this visit  Current Outpatient Prescriptions on File Prior to Visit   Medication Sig    KRILL OIL PO Take 1 tablet by mouth daily    Multiple Vitamin (MULTIVITAMIN) tablet Take 1 tablet by mouth daily    ofloxacin (OCUFLOX) 0 3 % ophthalmic solution Day 1-2: 1 drop to left eye q30 min when awake  Day 3-7: 1 drop to left eye q1h when awake  Day 8-10: 1 drop to affected eye q4h when awake   Omega-3 Fatty Acids (FISH OIL) 1,000 mg Take 1,000 mg by mouth daily   tobramycin (TOBREX) 0 3 % SOLN 1 drop every 4 (four) hours while awake   Vitamin D, Cholecalciferol, 1000 UNITS TABS Take by mouth    [DISCONTINUED] Prenatal Vit-Fe Fumarate-FA (M-VIT PO) Take 1 tablet by mouth daily   [DISCONTINUED] saccharomyces boulardii (FLORASTOR) 250 mg capsule Take 250 mg by mouth 2 (two) times a day  No current facility-administered medications on file prior to visit  She has No Known Allergies       Review of Systems   Constitutional: Negative  HENT: Negative  Eyes: Negative  Respiratory: Negative  Cardiovascular: Negative  Gastrointestinal: Negative  Endocrine: Negative  Genitourinary: Negative  Musculoskeletal: Positive for arthralgias and myalgias  Skin: Negative  Allergic/Immunologic: Negative  Neurological: Negative  Hematological: Negative  Psychiatric/Behavioral: Negative  Objective:      /88 (BP Location: Right arm, Patient Position: Sitting, Cuff Size: Large)   Pulse 69   Temp 98 7 °F (37 1 °C) (Temporal)   Wt 126 kg (278 lb 4 8 oz)   SpO2 96%   BMI 43 59 kg/m²          Physical Exam   Constitutional: She is oriented to person, place, and time  She appears well-developed and well-nourished  HENT:   Head: Normocephalic and atraumatic     Right Ear: External ear normal    Left Ear: External ear normal    Nose: Nose normal    Mouth/Throat: Oropharyngeal exudate present  Eyes: Conjunctivae and EOM are normal  Pupils are equal, round, and reactive to light  Neck: Normal range of motion  Neck supple  Cardiovascular: Normal rate, regular rhythm, normal heart sounds and intact distal pulses  Pulmonary/Chest: Effort normal and breath sounds normal    Abdominal: Soft  Bowel sounds are normal    Musculoskeletal: Normal range of motion  Neurological: She is alert and oriented to person, place, and time  She has normal reflexes  Skin: Skin is warm and dry  Psychiatric: She has a normal mood and affect   Her behavior is normal  Judgment and thought content normal

## 2018-06-28 NOTE — PROGRESS NOTES
Can you let Magali I did get her blood work back and her fasting sugar was high did she fast for the test??

## 2018-07-03 ENCOUNTER — TELEPHONE (OUTPATIENT)
Dept: INTERNAL MEDICINE CLINIC | Facility: CLINIC | Age: 53
End: 2018-07-03

## 2019-04-22 ENCOUNTER — TRANSCRIBE ORDERS (OUTPATIENT)
Dept: ADMINISTRATIVE | Facility: HOSPITAL | Age: 54
End: 2019-04-22

## 2019-04-22 ENCOUNTER — APPOINTMENT (OUTPATIENT)
Dept: RADIOLOGY | Age: 54
End: 2019-04-22
Payer: OTHER MISCELLANEOUS

## 2019-04-22 ENCOUNTER — APPOINTMENT (OUTPATIENT)
Dept: URGENT CARE | Age: 54
End: 2019-04-22
Payer: OTHER MISCELLANEOUS

## 2019-04-22 DIAGNOSIS — S83.502A SPRAIN OF CRUCIATE LIGAMENT OF LEFT KNEE, INITIAL ENCOUNTER: Primary | ICD-10-CM

## 2019-04-22 DIAGNOSIS — M25.562 LEFT KNEE PAIN, UNSPECIFIED CHRONICITY: ICD-10-CM

## 2019-04-22 DIAGNOSIS — S89.92XA INJURY OF LEFT KNEE, INITIAL ENCOUNTER: ICD-10-CM

## 2019-04-22 DIAGNOSIS — S89.92XA INJURY OF LEFT KNEE, INITIAL ENCOUNTER: Primary | ICD-10-CM

## 2019-04-22 PROCEDURE — 73564 X-RAY EXAM KNEE 4 OR MORE: CPT

## 2019-04-22 PROCEDURE — 99284 EMERGENCY DEPT VISIT MOD MDM: CPT | Performed by: PREVENTIVE MEDICINE

## 2019-04-22 PROCEDURE — G0383 LEV 4 HOSP TYPE B ED VISIT: HCPCS | Performed by: PREVENTIVE MEDICINE

## 2019-04-25 ENCOUNTER — APPOINTMENT (EMERGENCY)
Dept: RADIOLOGY | Facility: HOSPITAL | Age: 54
End: 2019-04-25
Payer: COMMERCIAL

## 2019-04-25 ENCOUNTER — HOSPITAL ENCOUNTER (EMERGENCY)
Facility: HOSPITAL | Age: 54
Discharge: HOME | End: 2019-04-25
Attending: EMERGENCY MEDICINE | Admitting: EMERGENCY MEDICINE
Payer: COMMERCIAL

## 2019-04-25 ENCOUNTER — OFFICE VISIT (OUTPATIENT)
Dept: URGENT CARE | Facility: CLINIC | Age: 54
End: 2019-04-25
Payer: COMMERCIAL

## 2019-04-25 VITALS
DIASTOLIC BLOOD PRESSURE: 59 MMHG | HEART RATE: 77 BPM | SYSTOLIC BLOOD PRESSURE: 125 MMHG | RESPIRATION RATE: 20 BRPM | OXYGEN SATURATION: 90 % | TEMPERATURE: 97.5 F | WEIGHT: 285.5 LBS | BODY MASS INDEX: 44.71 KG/M2

## 2019-04-25 VITALS
SYSTOLIC BLOOD PRESSURE: 138 MMHG | DIASTOLIC BLOOD PRESSURE: 86 MMHG | RESPIRATION RATE: 18 BRPM | OXYGEN SATURATION: 97 % | HEART RATE: 93 BPM | TEMPERATURE: 98.3 F
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
## 2019-04-25 DIAGNOSIS — S51.851A DOG BITE OF RIGHT FOREARM, INITIAL ENCOUNTER: Primary | ICD-10-CM

## 2019-04-25 PROCEDURE — 99284 EMERGENCY DEPT VISIT MOD MDM: CPT

## 2019-04-25 PROCEDURE — 73130 X-RAY EXAM OF HAND: CPT

## 2019-04-25 PROCEDURE — 99283 EMERGENCY DEPT VISIT LOW MDM: CPT | Performed by: EMERGENCY MEDICINE

## 2019-04-25 PROCEDURE — 12006 RPR S/N/A/GEN/TRK20.1-30.0CM: CPT | Performed by: EMERGENCY MEDICINE

## 2019-04-25 PROCEDURE — 99211 OFF/OP EST MAY X REQ PHY/QHP: CPT | Performed by: PHYSICIAN ASSISTANT

## 2019-04-25 PROCEDURE — 73090 X-RAY EXAM OF FOREARM: CPT

## 2019-04-25 RX ORDER — TRAMADOL HYDROCHLORIDE 50 MG/1
100 TABLET ORAL ONCE
Status: COMPLETED | OUTPATIENT
Start: 2019-04-25 | End: 2019-04-25

## 2019-04-25 RX ORDER — AMOXICILLIN AND CLAVULANATE POTASSIUM 875; 125 MG/1; MG/1
1 TABLET, FILM COATED ORAL EVERY 12 HOURS SCHEDULED
Qty: 20 TABLET | Refills: 0 | Status: SHIPPED | OUTPATIENT
Start: 2019-04-25 | End: 2019-05-05

## 2019-04-25 RX ORDER — MORPHINE SULFATE 15 MG/1
15-30 TABLET ORAL EVERY 4 HOURS PRN
Qty: 15 TABLET | Refills: 0 | Status: SHIPPED | OUTPATIENT
Start: 2019-04-25 | End: 2019-04-30

## 2019-04-25 RX ORDER — AMOXICILLIN AND CLAVULANATE POTASSIUM 875; 125 MG/1; MG/1
1 TABLET, FILM COATED ORAL ONCE
Status: COMPLETED | OUTPATIENT
Start: 2019-04-25 | End: 2019-04-25

## 2019-04-25 RX ORDER — LIDOCAINE HYDROCHLORIDE AND EPINEPHRINE 10; 10 MG/ML; UG/ML
20 INJECTION, SOLUTION INFILTRATION; PERINEURAL ONCE
Status: COMPLETED | OUTPATIENT
Start: 2019-04-25 | End: 2019-04-25

## 2019-04-25 RX ORDER — AMOXICILLIN AND CLAVULANATE POTASSIUM 875; 125 MG/1; MG/1
1 TABLET, FILM COATED ORAL EVERY 12 HOURS SCHEDULED
Qty: 20 TABLET | Refills: 0 | Status: SHIPPED | OUTPATIENT
Start: 2019-04-25 | End: 2019-04-25 | Stop reason: SDUPTHER

## 2019-04-25 RX ADMIN — AMOXICILLIN AND CLAVULANATE POTASSIUM 1 TABLET: 875; 125 TABLET, FILM COATED ORAL at 18:00

## 2019-04-25 RX ADMIN — LIDOCAINE HYDROCHLORIDE,EPINEPHRINE BITARTRATE 20 ML: 10; .01 INJECTION, SOLUTION INFILTRATION; PERINEURAL at 18:02

## 2019-04-25 RX ADMIN — LIDOCAINE HYDROCHLORIDE,EPINEPHRINE BITARTRATE 20 ML: 10; .01 INJECTION, SOLUTION INFILTRATION; PERINEURAL at 18:03

## 2019-04-25 RX ADMIN — TRAMADOL HYDROCHLORIDE 100 MG: 50 TABLET, COATED ORAL at 18:00

## 2019-04-26 ENCOUNTER — OFFICE VISIT (OUTPATIENT)
Dept: SURGERY | Facility: HOSPITAL | Age: 54
End: 2019-04-26
Payer: COMMERCIAL

## 2019-04-26 VITALS
HEIGHT: 66 IN | WEIGHT: 284 LBS | TEMPERATURE: 99.1 F | DIASTOLIC BLOOD PRESSURE: 84 MMHG | HEART RATE: 80 BPM | SYSTOLIC BLOOD PRESSURE: 143 MMHG | BODY MASS INDEX: 45.64 KG/M2
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
## 2019-04-26 DIAGNOSIS — S41.151A DOG BITE OF ARM, RIGHT, INITIAL ENCOUNTER: Primary | ICD-10-CM

## 2019-04-26 PROCEDURE — 99202 OFFICE O/P NEW SF 15 MIN: CPT | Performed by: SURGERY

## 2019-04-29 ENCOUNTER — HOSPITAL ENCOUNTER (OUTPATIENT)
Dept: MRI IMAGING | Facility: HOSPITAL | Age: 54
Discharge: HOME | End: 2019-04-29
Payer: OTHER MISCELLANEOUS

## 2019-04-29 DIAGNOSIS — S83.502A SPRAIN OF CRUCIATE LIGAMENT OF LEFT KNEE, INITIAL ENCOUNTER: ICD-10-CM

## 2019-04-29 DIAGNOSIS — M25.562 LEFT KNEE PAIN, UNSPECIFIED CHRONICITY: ICD-10-CM

## 2019-04-29 PROCEDURE — 73721 MRI JNT OF LWR EXTRE W/O DYE: CPT

## 2019-05-03 ENCOUNTER — APPOINTMENT (OUTPATIENT)
Dept: URGENT CARE | Age: 54
End: 2019-05-03
Payer: OTHER MISCELLANEOUS

## 2019-05-03 PROCEDURE — 99213 OFFICE O/P EST LOW 20 MIN: CPT | Performed by: PREVENTIVE MEDICINE

## 2019-05-07 ENCOUNTER — OFFICE VISIT (OUTPATIENT)
Dept: OBGYN CLINIC | Facility: CLINIC | Age: 54
End: 2019-05-07
Payer: OTHER MISCELLANEOUS

## 2019-05-07 ENCOUNTER — OFFICE VISIT (OUTPATIENT)
Dept: SURGERY | Facility: HOSPITAL | Age: 54
End: 2019-05-07
Payer: COMMERCIAL

## 2019-05-07 VITALS
SYSTOLIC BLOOD PRESSURE: 100 MMHG | WEIGHT: 276 LBS | HEART RATE: 77 BPM | BODY MASS INDEX: 44.36 KG/M2 | HEIGHT: 66 IN | DIASTOLIC BLOOD PRESSURE: 70 MMHG

## 2019-05-07 VITALS
WEIGHT: 276 LBS | HEART RATE: 77 BPM | HEIGHT: 66 IN | DIASTOLIC BLOOD PRESSURE: 70 MMHG | SYSTOLIC BLOOD PRESSURE: 100 MMHG | BODY MASS INDEX: 44.36 KG/M2

## 2019-05-07 DIAGNOSIS — S41.151A DOG BITE OF ARM, RIGHT, INITIAL ENCOUNTER: Primary | ICD-10-CM

## 2019-05-07 DIAGNOSIS — S83.204A COMPLEX TEAR OF MENISCUS OF LEFT KNEE, UNSPECIFIED MENISCUS, UNSPECIFIED WHETHER OLD OR CURRENT TEAR, INITIAL ENCOUNTER: Primary | ICD-10-CM
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
## 2019-05-07 PROCEDURE — 99211 OFF/OP EST MAY X REQ PHY/QHP: CPT | Performed by: SURGERY

## 2019-05-07 PROCEDURE — 99213 OFFICE O/P EST LOW 20 MIN: CPT | Performed by: ORTHOPAEDIC SURGERY

## 2019-05-07 RX ORDER — CHLORHEXIDINE GLUCONATE 4 G/100ML
SOLUTION TOPICAL DAILY PRN
Status: CANCELLED | OUTPATIENT
Start: 2019-05-07

## 2019-05-16 ENCOUNTER — APPOINTMENT (OUTPATIENT)
Dept: URGENT CARE | Age: 54
End: 2019-05-16
Payer: OTHER MISCELLANEOUS

## 2019-05-16 PROCEDURE — 99213 OFFICE O/P EST LOW 20 MIN: CPT | Performed by: PREVENTIVE MEDICINE

## 2019-05-30 ENCOUNTER — APPOINTMENT (OUTPATIENT)
Dept: LAB | Facility: HOSPITAL | Age: 54
End: 2019-05-30
Attending: ORTHOPAEDIC SURGERY
Payer: OTHER MISCELLANEOUS

## 2019-05-30 ENCOUNTER — HOSPITAL ENCOUNTER (OUTPATIENT)
Dept: NON INVASIVE DIAGNOSTICS | Facility: HOSPITAL | Age: 54
Discharge: HOME | End: 2019-05-30
Attending: ORTHOPAEDIC SURGERY
Payer: OTHER MISCELLANEOUS

## 2019-05-30 DIAGNOSIS — S83.204A COMPLEX TEAR OF MENISCUS OF LEFT KNEE, UNSPECIFIED MENISCUS, UNSPECIFIED WHETHER OLD OR CURRENT TEAR, INITIAL ENCOUNTER: ICD-10-CM

## 2019-05-30 LAB
ANION GAP SERPL CALCULATED.3IONS-SCNC: 11 MMOL/L (ref 4–13)
ATRIAL RATE: 67 BPM
BASOPHILS # BLD AUTO: 0.04 THOUSANDS/ΜL (ref 0–0.1)
BASOPHILS NFR BLD AUTO: 1 % (ref 0–1)
BUN SERPL-MCNC: 21 MG/DL (ref 5–25)
CALCIUM SERPL-MCNC: 9.5 MG/DL (ref 8.3–10.1)
CHLORIDE SERPL-SCNC: 102 MMOL/L (ref 100–108)
CO2 SERPL-SCNC: 25 MMOL/L (ref 21–32)
CREAT SERPL-MCNC: 0.74 MG/DL (ref 0.6–1.3)
EOSINOPHIL # BLD AUTO: 0.14 THOUSAND/ΜL (ref 0–0.61)
EOSINOPHIL NFR BLD AUTO: 2 % (ref 0–6)
ERYTHROCYTE [DISTWIDTH] IN BLOOD BY AUTOMATED COUNT: 13.3 % (ref 11.6–15.1)
GFR SERPL CREATININE-BSD FRML MDRD: 92 ML/MIN/1.73SQ M
GLUCOSE SERPL-MCNC: 106 MG/DL (ref 65–140)
HCT VFR BLD AUTO: 43.4 % (ref 34.8–46.1)
HGB BLD-MCNC: 14 G/DL (ref 11.5–15.4)
IMM GRANULOCYTES # BLD AUTO: 0.02 THOUSAND/UL (ref 0–0.2)
IMM GRANULOCYTES NFR BLD AUTO: 0 % (ref 0–2)
LYMPHOCYTES # BLD AUTO: 1.99 THOUSANDS/ΜL (ref 0.6–4.47)
LYMPHOCYTES NFR BLD AUTO: 30 % (ref 14–44)
MCH RBC QN AUTO: 29.9 PG (ref 26.8–34.3)
MCHC RBC AUTO-ENTMCNC: 32.3 G/DL (ref 31.4–37.4)
MCV RBC AUTO: 93 FL (ref 82–98)
MONOCYTES # BLD AUTO: 0.59 THOUSAND/ΜL (ref 0.17–1.22)
MONOCYTES NFR BLD AUTO: 9 % (ref 4–12)
NEUTROPHILS # BLD AUTO: 3.82 THOUSANDS/ΜL (ref 1.85–7.62)
NEUTS SEG NFR BLD AUTO: 58 % (ref 43–75)
NRBC BLD AUTO-RTO: 0 /100 WBCS
P AXIS: 60 DEGREES
PLATELET # BLD AUTO: 231 THOUSANDS/UL (ref 149–390)
PMV BLD AUTO: 11.9 FL (ref 8.9–12.7)
POTASSIUM SERPL-SCNC: 3.8 MMOL/L (ref 3.5–5.3)
PR INTERVAL: 148 MS
QRS AXIS: 76 DEGREES
QRSD INTERVAL: 90 MS
QT INTERVAL: 428 MS
QTC INTERVAL: 452 MS
RBC # BLD AUTO: 4.68 MILLION/UL (ref 3.81–5.12)
SODIUM SERPL-SCNC: 138 MMOL/L (ref 136–145)
T WAVE AXIS: 30 DEGREES
VENTRICULAR RATE: 67 BPM
WBC # BLD AUTO: 6.6 THOUSAND/UL (ref 4.31–10.16)

## 2019-05-30 PROCEDURE — 93005 ELECTROCARDIOGRAM TRACING: CPT

## 2019-05-30 PROCEDURE — 80048 BASIC METABOLIC PNL TOTAL CA: CPT

## 2019-05-30 PROCEDURE — 36415 COLL VENOUS BLD VENIPUNCTURE: CPT

## 2019-05-30 PROCEDURE — 93010 ELECTROCARDIOGRAM REPORT: CPT | Performed by: INTERNAL MEDICINE

## 2019-05-30 PROCEDURE — 85025 COMPLETE CBC W/AUTO DIFF WBC: CPT

## 2019-06-02 ENCOUNTER — ANESTHESIA EVENT (OUTPATIENT)
Dept: PERIOP | Facility: HOSPITAL | Age: 54
End: 2019-06-02
Payer: OTHER MISCELLANEOUS

## 2019-06-03 ENCOUNTER — ANESTHESIA (OUTPATIENT)
Dept: PERIOP | Facility: HOSPITAL | Age: 54
End: 2019-06-03
Payer: OTHER MISCELLANEOUS

## 2019-06-03 ENCOUNTER — TELEPHONE (OUTPATIENT)
Dept: OBGYN CLINIC | Facility: HOSPITAL | Age: 54
End: 2019-06-03

## 2019-06-03 ENCOUNTER — HOSPITAL ENCOUNTER (OUTPATIENT)
Facility: HOSPITAL | Age: 54
Setting detail: OUTPATIENT SURGERY
Discharge: HOME | End: 2019-06-03
Attending: ORTHOPAEDIC SURGERY | Admitting: ORTHOPAEDIC SURGERY
Payer: OTHER MISCELLANEOUS

## 2019-06-03 VITALS
RESPIRATION RATE: 20 BRPM | HEIGHT: 66 IN | DIASTOLIC BLOOD PRESSURE: 74 MMHG | WEIGHT: 270 LBS | BODY MASS INDEX: 43.39 KG/M2 | TEMPERATURE: 97.9 F | HEART RATE: 78 BPM | OXYGEN SATURATION: 96 % | SYSTOLIC BLOOD PRESSURE: 128 MMHG

## 2019-06-03 DIAGNOSIS — S83.232D COMPLEX TEAR OF MEDIAL MENISCUS OF LEFT KNEE AS CURRENT INJURY, SUBSEQUENT ENCOUNTER: Primary | ICD-10-CM

## 2019-06-03 PROCEDURE — 29876 ARTHRS KNEE SURG SYNVCT MAJ: CPT | Performed by: PHYSICIAN ASSISTANT

## 2019-06-03 PROCEDURE — 29881 ARTHRS KNE SRG MNISECTMY M/L: CPT | Performed by: ORTHOPAEDIC SURGERY

## 2019-06-03 PROCEDURE — 29881 ARTHRS KNE SRG MNISECTMY M/L: CPT | Performed by: PHYSICIAN ASSISTANT

## 2019-06-03 PROCEDURE — 29876 ARTHRS KNEE SURG SYNVCT MAJ: CPT | Performed by: ORTHOPAEDIC SURGERY

## 2019-06-03 RX ORDER — DIPHENHYDRAMINE HYDROCHLORIDE 50 MG/ML
12.5 INJECTION INTRAMUSCULAR; INTRAVENOUS ONCE AS NEEDED
Status: DISCONTINUED | OUTPATIENT
Start: 2019-06-03 | End: 2019-06-03 | Stop reason: HOSPADM

## 2019-06-03 RX ORDER — HYDROMORPHONE HCL/PF 1 MG/ML
0.5 SYRINGE (ML) INJECTION
Status: DISCONTINUED | OUTPATIENT
Start: 2019-06-03 | End: 2019-06-03 | Stop reason: HOSPADM

## 2019-06-03 RX ORDER — LIDOCAINE HYDROCHLORIDE 10 MG/ML
0.5 INJECTION, SOLUTION EPIDURAL; INFILTRATION; INTRACAUDAL; PERINEURAL ONCE AS NEEDED
Status: DISCONTINUED | OUTPATIENT
Start: 2019-06-03 | End: 2019-06-03 | Stop reason: HOSPADM

## 2019-06-03 RX ORDER — LIDOCAINE HYDROCHLORIDE 10 MG/ML
INJECTION, SOLUTION INFILTRATION; PERINEURAL AS NEEDED
Status: DISCONTINUED | OUTPATIENT
Start: 2019-06-03 | End: 2019-06-03 | Stop reason: SURG

## 2019-06-03 RX ORDER — LIDOCAINE HYDROCHLORIDE AND EPINEPHRINE 10; 10 MG/ML; UG/ML
INJECTION, SOLUTION INFILTRATION; PERINEURAL AS NEEDED
Status: DISCONTINUED | OUTPATIENT
Start: 2019-06-03 | End: 2019-06-03 | Stop reason: HOSPADM

## 2019-06-03 RX ORDER — OXYCODONE HYDROCHLORIDE AND ACETAMINOPHEN 5; 325 MG/1; MG/1
2 TABLET ORAL EVERY 4 HOURS PRN
Status: DISCONTINUED | OUTPATIENT
Start: 2019-06-03 | End: 2019-06-03

## 2019-06-03 RX ORDER — MAGNESIUM HYDROXIDE 1200 MG/15ML
LIQUID ORAL AS NEEDED
Status: DISCONTINUED | OUTPATIENT
Start: 2019-06-03 | End: 2019-06-03 | Stop reason: HOSPADM

## 2019-06-03 RX ORDER — SODIUM CHLORIDE, SODIUM LACTATE, POTASSIUM CHLORIDE, CALCIUM CHLORIDE 600; 310; 30; 20 MG/100ML; MG/100ML; MG/100ML; MG/100ML
INJECTION, SOLUTION INTRAVENOUS CONTINUOUS PRN
Status: DISCONTINUED | OUTPATIENT
Start: 2019-06-03 | End: 2019-06-03 | Stop reason: SURG

## 2019-06-03 RX ORDER — MIDAZOLAM HYDROCHLORIDE 1 MG/ML
INJECTION INTRAMUSCULAR; INTRAVENOUS AS NEEDED
Status: DISCONTINUED | OUTPATIENT
Start: 2019-06-03 | End: 2019-06-03 | Stop reason: SURG

## 2019-06-03 RX ORDER — CHLORHEXIDINE GLUCONATE 4 G/100ML
SOLUTION TOPICAL DAILY PRN
Status: DISCONTINUED | OUTPATIENT
Start: 2019-06-03 | End: 2019-06-03 | Stop reason: HOSPADM

## 2019-06-03 RX ORDER — OXYCODONE HYDROCHLORIDE AND ACETAMINOPHEN 5; 325 MG/1; MG/1
1 TABLET ORAL EVERY 8 HOURS PRN
Qty: 15 TABLET | Refills: 0 | Status: SHIPPED | OUTPATIENT
Start: 2019-06-03 | End: 2019-06-06

## 2019-06-03 RX ORDER — METOCLOPRAMIDE HYDROCHLORIDE 5 MG/ML
10 INJECTION INTRAMUSCULAR; INTRAVENOUS ONCE AS NEEDED
Status: DISCONTINUED | OUTPATIENT
Start: 2019-06-03 | End: 2019-06-03 | Stop reason: HOSPADM

## 2019-06-03 RX ORDER — ONDANSETRON 2 MG/ML
INJECTION INTRAMUSCULAR; INTRAVENOUS AS NEEDED
Status: DISCONTINUED | OUTPATIENT
Start: 2019-06-03 | End: 2019-06-03 | Stop reason: SURG

## 2019-06-03 RX ORDER — DEXAMETHASONE SODIUM PHOSPHATE 10 MG/ML
INJECTION, SOLUTION INTRAMUSCULAR; INTRAVENOUS AS NEEDED
Status: DISCONTINUED | OUTPATIENT
Start: 2019-06-03 | End: 2019-06-03 | Stop reason: SURG

## 2019-06-03 RX ORDER — FENTANYL CITRATE/PF 50 MCG/ML
50 SYRINGE (ML) INJECTION
Status: DISCONTINUED | OUTPATIENT
Start: 2019-06-03 | End: 2019-06-03 | Stop reason: HOSPADM

## 2019-06-03 RX ORDER — TRAMADOL HYDROCHLORIDE 50 MG/1
50 TABLET ORAL EVERY 8 HOURS PRN
Qty: 15 TABLET | Refills: 0 | Status: SHIPPED | OUTPATIENT
Start: 2019-06-03 | End: 2020-09-17

## 2019-06-03 RX ORDER — HYDROMORPHONE HCL/PF 1 MG/ML
0.2 SYRINGE (ML) INJECTION
Status: DISCONTINUED | OUTPATIENT
Start: 2019-06-03 | End: 2019-06-03 | Stop reason: HOSPADM

## 2019-06-03 RX ORDER — FENTANYL CITRATE 50 UG/ML
INJECTION, SOLUTION INTRAMUSCULAR; INTRAVENOUS AS NEEDED
Status: DISCONTINUED | OUTPATIENT
Start: 2019-06-03 | End: 2019-06-03 | Stop reason: SURG

## 2019-06-03 RX ORDER — ONDANSETRON 2 MG/ML
4 INJECTION INTRAMUSCULAR; INTRAVENOUS ONCE AS NEEDED
Status: DISCONTINUED | OUTPATIENT
Start: 2019-06-03 | End: 2019-06-03 | Stop reason: HOSPADM

## 2019-06-03 RX ORDER — PROPOFOL 10 MG/ML
INJECTION, EMULSION INTRAVENOUS AS NEEDED
Status: DISCONTINUED | OUTPATIENT
Start: 2019-06-03 | End: 2019-06-03 | Stop reason: SURG

## 2019-06-03 RX ADMIN — SODIUM CHLORIDE, SODIUM LACTATE, POTASSIUM CHLORIDE, AND CALCIUM CHLORIDE: .6; .31; .03; .02 INJECTION, SOLUTION INTRAVENOUS at 07:00

## 2019-06-03 RX ADMIN — FENTANYL CITRATE 25 MCG: 50 INJECTION, SOLUTION INTRAMUSCULAR; INTRAVENOUS at 07:47

## 2019-06-03 RX ADMIN — LIDOCAINE HYDROCHLORIDE 50 MG: 10 INJECTION, SOLUTION INFILTRATION; PERINEURAL at 07:31

## 2019-06-03 RX ADMIN — DEXAMETHASONE SODIUM PHOSPHATE 8 MG: 10 INJECTION, SOLUTION INTRAMUSCULAR; INTRAVENOUS at 07:40

## 2019-06-03 RX ADMIN — ONDANSETRON HYDROCHLORIDE 4 MG: 2 INJECTION, SOLUTION INTRAVENOUS at 08:05

## 2019-06-03 RX ADMIN — FENTANYL CITRATE 50 MCG: 50 INJECTION, SOLUTION INTRAMUSCULAR; INTRAVENOUS at 07:34

## 2019-06-03 RX ADMIN — MIDAZOLAM HYDROCHLORIDE 2 MG: 1 INJECTION, SOLUTION INTRAMUSCULAR; INTRAVENOUS at 07:28

## 2019-06-03 RX ADMIN — FENTANYL CITRATE 25 MCG: 50 INJECTION, SOLUTION INTRAMUSCULAR; INTRAVENOUS at 07:38

## 2019-06-03 RX ADMIN — CEFAZOLIN 3000 MG: 1 INJECTION, POWDER, FOR SOLUTION INTRAMUSCULAR; INTRAVENOUS at 07:28

## 2019-06-03 RX ADMIN — PROPOFOL 200 MG: 10 INJECTION, EMULSION INTRAVENOUS at 07:31

## 2019-06-06 ENCOUNTER — OFFICE VISIT (OUTPATIENT)
Dept: INTERNAL MEDICINE CLINIC | Facility: CLINIC | Age: 54
End: 2019-06-06
Payer: COMMERCIAL

## 2019-06-06 VITALS
SYSTOLIC BLOOD PRESSURE: 100 MMHG | BODY MASS INDEX: 45.48 KG/M2 | HEART RATE: 79 BPM | HEIGHT: 66 IN | TEMPERATURE: 99 F | DIASTOLIC BLOOD PRESSURE: 70 MMHG | OXYGEN SATURATION: 95 % | WEIGHT: 283 LBS

## 2019-06-06 DIAGNOSIS — Z13.6 SCREENING FOR CARDIOVASCULAR CONDITION: ICD-10-CM

## 2019-06-06 DIAGNOSIS — Z11.59 NEED FOR HEPATITIS C SCREENING TEST: ICD-10-CM

## 2019-06-06 DIAGNOSIS — E66.01 MORBID OBESITY WITH BMI OF 45.0-49.9, ADULT (HCC): ICD-10-CM

## 2019-06-06 DIAGNOSIS — Z85.42 HISTORY OF ENDOMETRIAL CANCER: ICD-10-CM

## 2019-06-06 DIAGNOSIS — Z13.1 SCREENING FOR DIABETES MELLITUS: ICD-10-CM

## 2019-06-06 DIAGNOSIS — R91.1 PULMONARY NODULE: ICD-10-CM

## 2019-06-06 DIAGNOSIS — E03.9 HYPOTHYROIDISM, UNSPECIFIED TYPE: Primary | ICD-10-CM

## 2019-06-06 DIAGNOSIS — Z12.31 VISIT FOR SCREENING MAMMOGRAM: ICD-10-CM
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
## 2019-06-06 PROBLEM — S41.151A DOG BITE OF ARM, RIGHT, INITIAL ENCOUNTER: Status: RESOLVED | Noted: 2019-04-26 | Resolved: 2019-06-06

## 2019-06-06 PROBLEM — S93.492A SPRAIN OF ANTERIOR TALOFIBULAR LIGAMENT OF LEFT ANKLE: Status: ACTIVE | Noted: 2018-01-12

## 2019-06-06 PROBLEM — R10.9 FLANK PAIN: Status: RESOLVED | Noted: 2018-06-20 | Resolved: 2019-06-06

## 2019-06-06 PROCEDURE — 99213 OFFICE O/P EST LOW 20 MIN: CPT | Performed by: FAMILY MEDICINE

## 2019-06-14 ENCOUNTER — OFFICE VISIT (OUTPATIENT)
Dept: OBGYN CLINIC | Facility: CLINIC | Age: 54
End: 2019-06-14

## 2019-06-14 VITALS
HEIGHT: 66 IN | SYSTOLIC BLOOD PRESSURE: 141 MMHG | BODY MASS INDEX: 45.32 KG/M2 | HEART RATE: 76 BPM | DIASTOLIC BLOOD PRESSURE: 87 MMHG | WEIGHT: 282 LBS

## 2019-06-14 DIAGNOSIS — Z98.890 STATUS POST ARTHROSCOPY OF LEFT KNEE: Primary | ICD-10-CM

## 2019-06-14 PROCEDURE — 99024 POSTOP FOLLOW-UP VISIT: CPT | Performed by: ORTHOPAEDIC SURGERY

## 2019-06-19 ENCOUNTER — HOSPITAL ENCOUNTER (OUTPATIENT)
Dept: CT IMAGING | Facility: HOSPITAL | Age: 54
Discharge: HOME | End: 2019-06-19
Payer: COMMERCIAL

## 2019-06-19 DIAGNOSIS — R91.1 PULMONARY NODULE: ICD-10-CM

## 2019-06-19 PROCEDURE — 71260 CT THORAX DX C+: CPT

## 2019-06-19 RX ADMIN — IOHEXOL 85 ML: 350 INJECTION, SOLUTION INTRAVENOUS at 10:48

## 2019-06-21 ENCOUNTER — EVALUATION (OUTPATIENT)
Dept: PHYSICAL THERAPY | Facility: CLINIC | Age: 54
End: 2019-06-21
Payer: OTHER MISCELLANEOUS

## 2019-06-21 ENCOUNTER — HOSPITAL ENCOUNTER (OUTPATIENT)
Dept: MAMMOGRAPHY | Facility: HOSPITAL | Age: 54
Discharge: HOME | End: 2019-06-21
Payer: COMMERCIAL

## 2019-06-21 VITALS — WEIGHT: 282 LBS | HEIGHT: 66 IN | BODY MASS INDEX: 45.32 KG/M2

## 2019-06-21 DIAGNOSIS — M25.562 ACUTE PAIN OF LEFT KNEE: ICD-10-CM

## 2019-06-21 DIAGNOSIS — Z98.890 S/P ARTHROSCOPIC KNEE SURGERY: Primary | ICD-10-CM

## 2019-06-21 DIAGNOSIS — Z12.31 VISIT FOR SCREENING MAMMOGRAM: ICD-10-CM

## 2019-06-21 PROCEDURE — 97162 PT EVAL MOD COMPLEX 30 MIN: CPT | Performed by: PHYSICAL THERAPIST

## 2019-06-21 PROCEDURE — 77063 BREAST TOMOSYNTHESIS BI: CPT

## 2019-06-21 PROCEDURE — 97110 THERAPEUTIC EXERCISES: CPT | Performed by: PHYSICAL THERAPIST

## 2019-06-21 PROCEDURE — 77067 SCR MAMMO BI INCL CAD: CPT

## 2019-06-24 ENCOUNTER — OFFICE VISIT (OUTPATIENT)
Dept: PHYSICAL THERAPY | Facility: CLINIC | Age: 54
End: 2019-06-24
Payer: OTHER MISCELLANEOUS

## 2019-06-24 DIAGNOSIS — R92.8 ABNORMAL MAMMOGRAM OF RIGHT BREAST: Primary | ICD-10-CM

## 2019-06-24 DIAGNOSIS — Z98.890 S/P ARTHROSCOPIC KNEE SURGERY: Primary | ICD-10-CM

## 2019-06-24 PROCEDURE — 97140 MANUAL THERAPY 1/> REGIONS: CPT

## 2019-06-24 PROCEDURE — 97110 THERAPEUTIC EXERCISES: CPT

## 2019-06-26 ENCOUNTER — OFFICE VISIT (OUTPATIENT)
Dept: PHYSICAL THERAPY | Facility: CLINIC | Age: 54
End: 2019-06-26
Payer: OTHER MISCELLANEOUS

## 2019-06-26 DIAGNOSIS — Z98.890 S/P ARTHROSCOPIC KNEE SURGERY: Primary | ICD-10-CM

## 2019-06-26 DIAGNOSIS — M25.562 ACUTE PAIN OF LEFT KNEE: ICD-10-CM

## 2019-06-26 PROCEDURE — 97112 NEUROMUSCULAR REEDUCATION: CPT | Performed by: PHYSICAL THERAPIST

## 2019-06-26 PROCEDURE — 97110 THERAPEUTIC EXERCISES: CPT | Performed by: PHYSICAL THERAPIST

## 2019-06-27 ENCOUNTER — OFFICE VISIT (OUTPATIENT)
Dept: PHYSICAL THERAPY | Facility: CLINIC | Age: 54
End: 2019-06-27
Payer: OTHER MISCELLANEOUS

## 2019-06-27 DIAGNOSIS — Z98.890 S/P ARTHROSCOPIC KNEE SURGERY: Primary | ICD-10-CM

## 2019-06-27 DIAGNOSIS — M25.562 ACUTE PAIN OF LEFT KNEE: ICD-10-CM

## 2019-06-27 PROCEDURE — 97110 THERAPEUTIC EXERCISES: CPT | Performed by: PHYSICAL THERAPIST

## 2019-06-27 PROCEDURE — 97140 MANUAL THERAPY 1/> REGIONS: CPT | Performed by: PHYSICAL THERAPIST

## 2019-07-17 ENCOUNTER — OFFICE VISIT (OUTPATIENT)
Dept: OBGYN CLINIC | Facility: CLINIC | Age: 54
End: 2019-07-17

## 2019-07-17 VITALS
WEIGHT: 283 LBS | HEART RATE: 64 BPM | HEIGHT: 66 IN | DIASTOLIC BLOOD PRESSURE: 73 MMHG | SYSTOLIC BLOOD PRESSURE: 115 MMHG | BODY MASS INDEX: 45.48 KG/M2

## 2019-07-17 DIAGNOSIS — Z98.890 STATUS POST ARTHROSCOPY OF LEFT KNEE: Primary | ICD-10-CM

## 2019-07-17 PROCEDURE — 99024 POSTOP FOLLOW-UP VISIT: CPT | Performed by: ORTHOPAEDIC SURGERY

## 2019-07-17 NOTE — PROGRESS NOTES
Chief Complaint   arthroscopic debridement left knee    History Of Presenting Illness  Pau Quinonesgo 1965 presents with  Left knee arthroscopy and debridement on June 3rd, 2019  Patient presents for postop evaluation  Patient pleased with the outcome so far  Left knee is relatively asymptomatic      Current Medications  Current Outpatient Medications   Medication Sig Dispense Refill    KRILL OIL PO Take 1 tablet by mouth daily      Multiple Vitamin (MULTIVITAMIN) tablet Take 1 tablet by mouth daily      Omega-3 Fatty Acids (FISH OIL) 1,000 mg Take 1,000 mg by mouth daily   traMADol (ULTRAM) 50 mg tablet Take 1 tablet (50 mg total) by mouth every 8 (eight) hours as needed for moderate pain for up to 5 doses 15 tablet 0    Vitamin D, Cholecalciferol, 1000 UNITS TABS Take by mouth       No current facility-administered medications for this visit          Current Problems    Active Problems:   Patient Active Problem List    Diagnosis Date Noted    History of endometrial cancer 06/06/2019    Screening for cardiovascular condition 06/06/2019    Screening for diabetes mellitus 06/06/2019    Visit for screening mammogram 06/06/2019    Need for hepatitis C screening test 06/06/2019    Complex tear of meniscus of left knee 05/07/2019    Chronic midline low back pain without sciatica 06/20/2018    Sprain of anterior talofibular ligament of left ankle 01/12/2018    Nasal congestion 07/06/2016    Degenerative arthritis of cervical spine 06/06/2016    Lung nodule 06/06/2016    Obesity due to excess calories 05/29/2016    Pulmonary nodule 05/29/2016    Hyperesthesia 07/30/2015    Migraine headache 07/30/2015    Obstructive sleep apnea 07/30/2015    Frequent headaches 10/28/2014    Hypothyroidism 10/28/2014    Paresthesias 10/28/2014    Paroxysmal atrial fibrillation ([facility] [address] ) 10/28/2014         Review of Systems:    General: negative for - chills, fatigue, fever,  weight gain or weight loss  Psychological: negative for - anxiety, behavioral disorder, concentration difficulties  Ophthalmic: negative for - blurry vision, decreased vision, double vision,      Past Medical History:   Past Medical History:   Diagnosis Date    Arthritis     Endometrial adenocarcinoma ([address] )     Endometriosis     Facial twitching     06jun2016 resolved    Hypertension     Migraine     Thyroid disease        Past Surgical History:   Past Surgical History:   Procedure Laterality Date    ANKLE FRACTURE SURGERY Left     ANKLE SURGERY      APPENDECTOMY      CHOLECYSTECTOMY      HYSTERECTOMY      HYSTEROSCOPY      OOPHORECTOMY      IL KNEE SCOPE,MED/LAT MENISECTOMY Left 6/3/2019    Procedure: KNEE ARTHROSOCPY; partial medial meniscectomy and debridement, synovectomy, chondroplasty;  Surgeon: Zeinab Mckee MD;  Location: [facility];  Service: Orthopedics       Family History:  Family history reviewed and non-contributory  Family History   Problem Relation Age of Onset    Clotting disorder Sister         possible, history of multiple blood clots    Diabetes Father     Hypertension Father     Cancer Father         bladder cancer    Hypotension Family     No Known Problems Maternal Grandfather     Liver cancer Paternal Grandmother     Colon cancer Paternal Grandmother     No Known Problems Daughter     No Known Problems Maternal Grandmother     No Known Problems Daughter     Breast cancer additional onset Maternal Aunt     Bone cancer Maternal Aunt     No Known Problems Paternal Aunt        Social History:  Social History     Socioeconomic History    Marital status: /Civil Union     Spouse name: None    Number of children: 3    Years of education: None    Highest education level: None   Occupational History    None   Social Needs    Financial resource strain: None    Food insecurity:     Worry: None     Inability: None    Transportation needs:     Medical: None     Non-medical: None Tobacco Use    Smoking status: Never Smoker    Smokeless tobacco: Never Used    Tobacco comment: former smoker in all scripts   Substance and Sexual Activity    Alcohol use: Yes     Comment: OCASSIONAL    Drug use: No    Sexual activity: None   Lifestyle    Physical activity:     Days per week: None     Minutes per session: None    Stress: None   Relationships    Social connections:     Talks on phone: None     Gets together: None     Attends Taoism service: None     Active member of club or organization: None     Attends meetings of clubs or organizations: None     Relationship status: None    Intimate partner violence:     Fear of current or ex partner: None     Emotionally abused: None     Physically abused: None     Forced sexual activity: None   Other Topics Concern    None   Social History Narrative    ** Merged History Encounter **    Caffeine use                Allergies:   No Known Allergies        Physical ExaminationBP 115/73   Pulse 64   Ht 5' 6" (1 676 m)   Wt 128 kg (283 lb)   BMI 45 68 kg/m²   Gen: Alert and oriented to person, place, time  HEENT: EOMI, eyes clear, moist mucus membranes, hearing intact      Orthopedic Exam   left knee examination   All portal sites well healed, no evidence of infection,   excellent range of pain-free motion no joint line tenderness, calf soft non -tender, no distal deficits          Impression   stable status post left knee arthroscopic debridement        Plan     patient will continue home exercise program ice, use over-the-counter pain medicines as needed   patient will work on losing weight   follow-up p r n  Al Beard MD        Portions of the record may have been created with voice recognition software  Occasional wrong word or "sound a like" substitutions may have occurred due to the inherent limitations of voice recognition software  Read the chart carefully and recognize, using context, where substitutions have occurred

## 2019-07-17 NOTE — PATIENT INSTRUCTIONS
Knee Exercises   WHAT YOU NEED TO KNOW:   What do I need to know about knee exercises? Knee exercises help strengthen the muscles around your knee  Strong muscles can help reduce pain and decrease your risk of future injury  Knee exercises also help you heal after an injury or surgery  · Start slow  These are beginning exercises  Ask your healthcare provider if you need to see a physical therapist for more advanced exercises  As you get stronger, you may be able to do more sets of each exercise or add weights  · Stop if you feel pain  It is normal to feel some discomfort at first  Regular exercise will help decrease your discomfort over time  · Do the exercises on both legs  Do this so both knees remain strong  · Warm up before you do knee exercises  Walk or ride a stationary bike for 5 or 10 minutes to warm your muscles  How do I perform knee stretches safely? Always stretch before you do strengthening exercises  Do these stretching exercises again after you do the strengthening exercises  Do these stretches 4 or 5 days a week, or as directed  · Standing calf stretch: Face a wall and place both palms flat on the wall, or hold the back of a chair for balance  Keep a slight bend in your knees  Take a big step backward with one leg  Keep your other leg directly under you  Keep both heels flat and press your hips forward  Hold the stretch for 30 seconds, and then relax for 30 seconds  Switch legs  Repeat 2 or 3 times on each leg  · Standing quadriceps stretch:  Stand and place one hand against a wall or hold the back of a chair for balance  With your weight on one leg, bend your other leg and grab your ankle  Bring your heel toward your buttocks  Hold the stretch for 30 to 60 seconds  Switch legs  Repeat 2 or 3 times on each leg  · Sitting hamstring stretch:  Sit with both legs straight in front of you  Do not point or flex your toes   Place your palms on the floor and slide your hands forward until you feel the stretch  Do not round your back  Hold the stretch for 30 seconds  Repeat 2 or 3 times  How do I perform knee strengthening exercises safely? Do these exercises 4 or 5 days a week, or as directed  · Standing half squats:  Stand with your feet shoulder-width apart  Lean your back against a wall or hold the back of a chair for balance, if needed  Slowly sit down about 10 inches, as if you are going to sit in a chair  Your body weight should be mostly over your heels  Hold the squat for 5 seconds, then rise to a standing position  Do 3 sets of 10 squats to strengthen your buttocks and thighs  · Standing hamstring curls: Face a wall and place both palms flat on the wall, or hold the back of a chair for balance  With your weight on one leg, lift your other foot as close to your buttocks as you can  Hold for 5 seconds and then lower your leg  Do 2 sets of 10 curls on each leg  This exercise strengthens the muscles in the back of your thigh  · Standing calf raises:  Face a wall and place both palms flat on the wall, or hold the back of a chair for balance  Stand up straight, and do not lean  Place all your weight on one leg by lifting the other foot off the floor  Raise the heel of the foot that is on the floor as high as you can and then lower it  Do 2 sets of 10 calf raises on each leg to strengthen your calf muscles  · Straight leg lifts:  Lie on your stomach with straight legs  Fold your arms in front of you and rest your head in your arms  Tighten your leg muscles and raise one leg as high as you can  Hold for 5 seconds, then lower your leg  Do 2 sets of 10 lifts on each leg to strengthen your buttocks  · Sitting leg lifts:  Sit in a chair  Slowly straighten and raise one leg  Squeeze your thigh muscles and hold for 5 seconds  Relax and return your foot to the floor  Do 2 sets of 10 lifts on each leg   This helps strengthen the muscles in the front of your thigh  When should I contact my healthcare provider? · You have new pain or your pain becomes worse  · You have questions or concerns about your condition or care  CARE AGREEMENT:   You have the right to help plan your care  Learn about your health condition and how it may be treated  Discuss treatment options with your caregivers to decide what care you want to receive  You always have the right to refuse treatment  The above information is an  only  It is not intended as medical advice for individual conditions or treatments  Talk to your doctor, nurse or pharmacist before following any medical regimen to see if it is safe and effective for you  © 2017 [address] [facility] Information is for End User's use only and may not be sold, redistributed or otherwise used for commercial purposes  All illustrations and images included in CareNotes® are the copyrighted property of A D [employer] M [employer]  or Luis Quinonez

## 2019-07-18 ENCOUNTER — HOSPITAL ENCOUNTER (OUTPATIENT)
Dept: ULTRASOUND IMAGING | Facility: HOSPITAL | Age: 54
Discharge: HOME | End: 2019-07-18

## 2019-07-18 ENCOUNTER — HOSPITAL ENCOUNTER (OUTPATIENT)
Dept: MAMMOGRAPHY | Facility: HOSPITAL | Age: 54
Discharge: HOME | End: 2019-07-18
Payer: COMMERCIAL

## 2019-07-18 VITALS — WEIGHT: 283 LBS | HEIGHT: 66 IN | BODY MASS INDEX: 45.48 KG/M2

## 2019-07-18 DIAGNOSIS — R92.8 ABNORMAL MAMMOGRAM OF RIGHT BREAST: ICD-10-CM

## 2019-07-18 PROCEDURE — G0279 TOMOSYNTHESIS, MAMMO: HCPCS

## 2019-07-18 PROCEDURE — 77065 DX MAMMO INCL CAD UNI: CPT

## 2020-03-01 ENCOUNTER — OFFICE VISIT (OUTPATIENT)
Dept: URGENT CARE | Facility: CLINIC | Age: 55
End: 2020-03-01
Payer: COMMERCIAL

## 2020-03-01 VITALS
SYSTOLIC BLOOD PRESSURE: 126 MMHG | HEIGHT: 66 IN | WEIGHT: 283 LBS | DIASTOLIC BLOOD PRESSURE: 80 MMHG | RESPIRATION RATE: 18 BRPM | BODY MASS INDEX: 45.48 KG/M2 | HEART RATE: 81 BPM | TEMPERATURE: 98 F | OXYGEN SATURATION: 99 %

## 2020-03-01 DIAGNOSIS — J20.9 ACUTE BRONCHITIS, UNSPECIFIED ORGANISM: Primary | ICD-10-CM

## 2020-03-01 PROCEDURE — 99213 OFFICE O/P EST LOW 20 MIN: CPT | Performed by: NURSE PRACTITIONER

## 2020-03-01 PROCEDURE — 94640 AIRWAY INHALATION TREATMENT: CPT | Performed by: NURSE PRACTITIONER

## 2020-03-01 RX ORDER — ALBUTEROL SULFATE 90 UG/1
2 AEROSOL, METERED RESPIRATORY (INHALATION) EVERY 4 HOURS PRN
Qty: 8.5 G | Refills: 1 | Status: SHIPPED | OUTPATIENT
Start: 2020-03-01 | End: 2020-03-31

## 2020-03-01 RX ORDER — BENZONATATE 100 MG/1
CAPSULE ORAL
Qty: 30 CAPSULE | Refills: 0 | Status: SHIPPED | OUTPATIENT
Start: 2020-03-01 | End: 2020-09-17

## 2020-03-01 RX ORDER — ALBUTEROL SULFATE 2.5 MG/3ML
2.5 SOLUTION RESPIRATORY (INHALATION) ONCE
Status: COMPLETED | OUTPATIENT
Start: 2020-03-01 | End: 2020-03-01

## 2020-03-01 RX ORDER — AZITHROMYCIN 250 MG/1
TABLET, FILM COATED ORAL
Qty: 6 TABLET | Refills: 0 | Status: SHIPPED | OUTPATIENT
Start: 2020-03-01 | End: 2020-03-05

## 2020-03-01 RX ORDER — PREDNISONE 50 MG/1
50 TABLET ORAL DAILY
Qty: 5 TABLET | Refills: 0 | Status: SHIPPED | OUTPATIENT
Start: 2020-03-01 | End: 2020-03-06

## 2020-03-01 RX ADMIN — ALBUTEROL SULFATE 2.5 MG: 2.5 SOLUTION RESPIRATORY (INHALATION) at 19:46

## 2020-03-02 NOTE — PROGRESS NOTES
[facility]        NAME: Naty Holt is a 53 y o  female  : 1965    MRN: [id number]  DATE: 2020  TIME: 9:19 PM    Assessment and Plan   Acute bronchitis, unspecified organism [J20 9]  1  Acute bronchitis, unspecified organism  azithromycin (ZITHROMAX) 250 mg tablet    predniSONE 50 mg tablet    albuterol (ProAir HFA) 90 mcg/act inhaler    benzonatate (TESSALON PERLES) 100 mg capsule    albuterol inhalation solution 2 5 mg    predniSONE tablet 50 mg    Mini neb     Mini neb  Performed by: LOVE Rod  Authorized by: LOVE Rod     Number of treatments:  1  Treatment 1:   Pre-Procedure     Symptoms:  Wheezing, shortness of breath and cough    RR:  22    Medication Administered:  Albuterol 2 5 mg  Post-Procedure     Symptoms:  Wheezing and cough    RR:  18  Treatment 2:   Pre-Procedure     HR:  80  Post-procedure     HR:  90; regular  Nebulizer Comments:  Lungs tight with moderate coarse expiratory and a few inspiratory wheezes throughout initially  After nebulizer treatment significant improvement in air movement with only mild to moderate expiratory wheezes remaining  Overall patient tolerated the treatment well but does note a slight dizzy/jittery feeling  She states that her breathing feels much better and that she has started to cough up more mucus since the treatment     (pharmacy closed, so patient will take next dose of prednisone/1st outpatient dose tomorrow)    Patient Instructions     Patient Instructions     Take the azithromycin (antibiotic) and prednisone (steroid) as ordered until completed  Use the albuterol inhaler every 4-6 hours as needed for shortness of breath, chest tightness, wheezing or persistent cough  Use the Tessalon Perles up to 3x/day as needed for cough  Acute Bronchitis   AMBULATORY CARE:   Acute bronchitis  is swelling and irritation in the air passages of your lungs   This irritation may cause you to cough or have other breathing problems  Acute bronchitis often starts because of another illness, such as a cold or the flu  The illness spreads from your nose and throat to your windpipe and airways  Bronchitis is often called a chest cold  Acute bronchitis lasts about 3 to 6 weeks and is usually not a serious illness  Your cough can last for several weeks  You may have any of the following symptoms:   · A cough with sputum that may be clear, yellow, or green    · Feeling more tired than usual, and body aches    · A fever and chills    · Wheezing when you breathe    · A tight chest or pain when you breathe or cough  Seek care immediately if:   · You cough up blood  · Your lips or fingernails turn blue  · You feel like you are not getting enough air when you breathe  Contact your healthcare provider if:   · You have a fever  · Your breathing problems do not go away or get worse  · Your cough does not get better within 4 weeks  · You have questions or concerns about your condition or care  Self-care:   · Get more rest   Rest helps your body to heal  Slowly start to do more each day  Rest when you feel it is needed  · Avoid irritants in the air  Avoid chemicals, fumes, and dust  Wear a face mask if you must work around dust or fumes  Stay inside on days when air pollution levels are high  If you have allergies, stay inside when pollen counts are high  Do not use aerosol products, such as spray-on deodorant, bug spray, and hair spray  · Do not smoke or be around others who smoke  Nicotine and other chemicals in cigarettes and cigars damages the cilia that move mucus out of your lungs  Ask your healthcare provider for information if you currently smoke and need help to quit  E-cigarettes or smokeless tobacco still contain nicotine  Talk to your healthcare provider before you use these products  · Drink liquids as directed  Liquids help keep your air passages moist and help you cough up mucus   You may need to drink more liquids when you have acute bronchitis  Ask how much liquid to drink each day and which liquids are best for you  · Use a humidifier or vaporizer  Use a cool mist humidifier or a vaporizer to increase air moisture in your home  This may make it easier for you to breathe and help decrease your cough  Prevent acute bronchitis by doing the following:   · Get the vaccinations you need  Ask your healthcare provider if you should get vaccinated against the flu or pneumonia  · Prevent the spread of germs  You can decrease your risk of acute bronchitis and other illnesses by doing the following:     [facility] your hands often with soap and water  Carry germ-killing hand lotion or gel with you  You can use the lotion or gel to clean your hands when soap and water are not available  ¨ Do not touch your eyes, nose, or mouth unless you have washed your hands first     ¨ Always cover your mouth when you cough to prevent the spread of germs  It is best to cough into a tissue or your shirt sleeve instead of into your hand  Ask those around you cover their mouths when they cough  ¨ Try to avoid people who have a cold or the flu  If you are sick, stay away from others as much as possible  Medicines: Your healthcare provider may  give you any of the following:  · Ibuprofen or acetaminophen  are medicines that help lower your fever  They are available without a doctor's order  Ask your healthcare provider which medicine is right for you  Ask how much to take and how often to take it  Follow directions  These medicines can cause stomach bleeding if not taken correctly  Ibuprofen can cause kidney damage  Do not take ibuprofen if you have kidney disease, an ulcer, or allergies to aspirin  Acetaminophen can cause liver damage  Do not take more than 4,000 milligrams in 24 hours  · Decongestants  help loosen mucus in your lungs and make it easier to cough up  This can help you breathe easier      · Cough suppressants  decrease your urge to cough  If your cough produces mucus, do not take a cough suppressant unless your healthcare provider tells you to  Your healthcare provider may suggest that you take a cough suppressant at night so you can rest     · Inhalers  may be given  Your healthcare provider may give you one or more inhalers to help you breathe easier and cough less  An inhaler gives your medicine to open your airways  Ask your healthcare provider to show you how to use your inhaler correctly  Follow up with your healthcare provider as directed:  Write down questions you have so you will remember to ask them during your follow-up visits  © 2017 [employer]  Information is for End User's use only and may not be sold, redistributed or otherwise used for commercial purposes  All illustrations and images included in CareNotes® are the copyrighted property of [employer]  or [employer]  The above information is an  only  It is not intended as medical advice for individual conditions or treatments  Talk to your doctor, nurse or pharmacist before following any medical regimen to see if it is safe and effective for you  Follow up with PCP in 3-5 days  Proceed to  ER if symptoms worsen  Chief Complaint     Chief Complaint   Patient presents with    Cough     cough, wheezing, dyspnea, sinus congestion for 1 week         History of Present Illness       Patient reports that she has been sick for at least a week, maybe longer  Her symptoms were initially in her sinuses, but she states her sinuses was better  She states that the congestion went down to her chest over the last few days and she has had shortness of breath, chest tightness wheezing and cough  She does not currently having albuterol at home  She has a remote smoking history having quit between 20 and 30 years ago, and she notes she only ever smoke about half a pack per day    She had had before bronchitis but not in a long time      Review of Systems   Review of Systems   HENT: Positive for congestion, postnasal drip, sinus pressure (mostly resolved) and sinus pain  Respiratory: Positive for cough, chest tightness, shortness of breath and wheezing  Gastrointestinal: Negative  All other systems reviewed and are negative  Current Medications       Current Outpatient Medications:     KRILL OIL PO, Take 1 tablet by mouth daily, Disp: , Rfl:     Multiple Vitamin (MULTIVITAMIN) tablet, Take 1 tablet by mouth daily, Disp: , Rfl:     Omega-3 Fatty Acids (FISH OIL) 1,000 mg, Take 1,000 mg by mouth daily  , Disp: , Rfl:     Vitamin D, Cholecalciferol, 1000 UNITS TABS, Take by mouth, Disp: , Rfl:     albuterol (ProAir HFA) 90 mcg/act inhaler, Inhale 2 puffs every 4 (four) hours as needed for wheezing or shortness of breath, Disp: 8 5 g, Rfl: 1    azithromycin (ZITHROMAX) 250 mg tablet, Take 2 tablets today then 1 tablet daily x 4 days, Disp: 6 tablet, Rfl: 0    benzonatate (TESSALON PERLES) 100 mg capsule, 1-2 caps by mouth every 8 hours as needed for cough, Disp: 30 capsule, Rfl: 0    predniSONE 50 mg tablet, Take 1 tablet (50 mg total) by mouth daily for 5 days, Disp: 5 tablet, Rfl: 0    traMADol (ULTRAM) 50 mg tablet, Take 1 tablet (50 mg total) by mouth every 8 (eight) hours as needed for moderate pain for up to 5 doses (Patient not taking: Reported on 3/1/2020), Disp: 15 tablet, Rfl: 0  No current facility-administered medications for this visit       Current Allergies     Allergies as of 03/01/2020    (No Known Allergies)            The following portions of the patient's history were reviewed and updated as appropriate: allergies, current medications, past family history, past medical history, past social history, past surgical history and problem list      Past Medical History:   Diagnosis Date    Arthritis     Endometrial adenocarcinoma ([facility] [address] )     Endometriosis     Facial twitching     [id number] resolved    Hypertension     Migraine     Obesity     Thyroid disease        Past Surgical History:   Procedure Laterality Date    ANKLE FRACTURE SURGERY Left     ANKLE SURGERY      APPENDECTOMY      CHOLECYSTECTOMY      HYSTERECTOMY      HYSTEROSCOPY      OOPHORECTOMY      VT KNEE SCOPE,MED/LAT MENISECTOMY Left 6/3/2019    Procedure: KNEE ARTHROSOCPY; partial medial meniscectomy and debridement, synovectomy, chondroplasty;  Surgeon: Jagdish Levy MD;  Location: [facility];  Service: Orthopedics       Family History   Problem Relation Age of Onset    Clotting disorder Sister         possible, history of multiple blood clots    Diabetes Father     Hypertension Father     Cancer Father         bladder cancer    Hypotension Family     No Known Problems Maternal Grandfather     Liver cancer Paternal Grandmother     Colon cancer Paternal Grandmother     No Known Problems Daughter     No Known Problems Maternal Grandmother     No Known Problems Daughter     Breast cancer additional onset Maternal Aunt     Bone cancer Maternal Aunt     No Known Problems Paternal Aunt          Medications have been verified  Objective   /80   Pulse 81   Temp 98 °F (36 7 °C) (Tympanic)   Resp 18   Ht 5' 6" (1 676 m)   Wt 128 kg (283 lb)   SpO2 99%   BMI 45 68 kg/m²        Physical Exam     Physical Exam   Constitutional: She is oriented to person, place, and time  She appears well-developed and well-nourished  No distress  HENT:   Head: Normocephalic and atraumatic  Right Ear: Hearing, tympanic membrane, external ear and ear canal normal    Left Ear: Hearing, tympanic membrane, external ear and ear canal normal    Mouth/Throat: Uvula is midline, oropharynx is clear and moist and mucous membranes are normal  No posterior oropharyngeal erythema or tonsillar abscesses  No tonsillar exudate  Eyes: Pupils are equal, round, and reactive to light     Neck: Normal range of motion  Neck supple  Cardiovascular: Normal rate, regular rhythm and normal heart sounds  Pulmonary/Chest: Effort normal  No accessory muscle usage or stridor  No apnea, no tachypnea and no bradypnea  No respiratory distress  She has decreased breath sounds  She has wheezes  She has no rhonchi  She has no rales  Lungs tight with moderate coarse expiratory and a few inspiratory wheezes throughout initially  After nebulizer treatment significant improvement in air movement with only mild to moderate expiratory wheezes remaining  Overall patient tolerated the treatment well but does note a slight dizzy/jittery feeling  She states that her breathing feels much better and that she has started to cough up more mucus since the treatment  Abdominal: Soft  Bowel sounds are normal  She exhibits no distension  There is no tenderness  Musculoskeletal: Normal range of motion  Neurological: She is alert and oriented to person, place, and time  Skin: Skin is warm and dry  Capillary refill takes less than 2 seconds  She is not diaphoretic  Psychiatric: She has a normal mood and affect  Her behavior is normal  Judgment and thought content normal    Nursing note and vitals reviewed

## 2020-03-02 NOTE — PATIENT INSTRUCTIONS
Take the azithromycin (antibiotic) and prednisone (steroid) as ordered until completed  Use the albuterol inhaler every 4-6 hours as needed for shortness of breath, chest tightness, wheezing or persistent cough  Use the Tessalon Perles up to 3x/day as needed for cough  Acute Bronchitis   AMBULATORY CARE:   Acute bronchitis  is swelling and irritation in the air passages of your lungs  This irritation may cause you to cough or have other breathing problems  Acute bronchitis often starts because of another illness, such as a cold or the flu  The illness spreads from your nose and throat to your windpipe and airways  Bronchitis is often called a chest cold  Acute bronchitis lasts about 3 to 6 weeks and is usually not a serious illness  Your cough can last for several weeks  You may have any of the following symptoms:   · A cough with sputum that may be clear, yellow, or green    · Feeling more tired than usual, and body aches    · A fever and chills    · Wheezing when you breathe    · A tight chest or pain when you breathe or cough  Seek care immediately if:   · You cough up blood  · Your lips or fingernails turn blue  · You feel like you are not getting enough air when you breathe  Contact your healthcare provider if:   · You have a fever  · Your breathing problems do not go away or get worse  · Your cough does not get better within 4 weeks  · You have questions or concerns about your condition or care  Self-care:   · Get more rest   Rest helps your body to heal  Slowly start to do more each day  Rest when you feel it is needed  · Avoid irritants in the air  Avoid chemicals, fumes, and dust  Wear a face mask if you must work around dust or fumes  Stay inside on days when air pollution levels are high  If you have allergies, stay inside when pollen counts are high  Do not use aerosol products, such as spray-on deodorant, bug spray, and hair spray      · Do not smoke or be around others who smoke   Nicotine and other chemicals in cigarettes and cigars damages the cilia that move mucus out of your lungs  Ask your healthcare provider for information if you currently smoke and need help to quit  E-cigarettes or smokeless tobacco still contain nicotine  Talk to your healthcare provider before you use these products  · Drink liquids as directed  Liquids help keep your air passages moist and help you cough up mucus  You may need to drink more liquids when you have acute bronchitis  Ask how much liquid to drink each day and which liquids are best for you  · Use a humidifier or vaporizer  Use a cool mist humidifier or a vaporizer to increase air moisture in your home  This may make it easier for you to breathe and help decrease your cough  Prevent acute bronchitis by doing the following:   · Get the vaccinations you need  Ask your healthcare provider if you should get vaccinated against the flu or pneumonia  · Prevent the spread of germs  You can decrease your risk of acute bronchitis and other illnesses by doing the following:     [facility] your hands often with soap and water  Carry germ-killing hand lotion or gel with you  You can use the lotion or gel to clean your hands when soap and water are not available  ¨ Do not touch your eyes, nose, or mouth unless you have washed your hands first     ¨ Always cover your mouth when you cough to prevent the spread of germs  It is best to cough into a tissue or your shirt sleeve instead of into your hand  Ask those around you cover their mouths when they cough  ¨ Try to avoid people who have a cold or the flu  If you are sick, stay away from others as much as possible  Medicines: Your healthcare provider may  give you any of the following:  · Ibuprofen or acetaminophen  are medicines that help lower your fever  They are available without a doctor's order  Ask your healthcare provider which medicine is right for you   Ask how much to take and how often to take it  Follow directions  These medicines can cause stomach bleeding if not taken correctly  Ibuprofen can cause kidney damage  Do not take ibuprofen if you have kidney disease, an ulcer, or allergies to aspirin  Acetaminophen can cause liver damage  Do not take more than 4,000 milligrams in 24 hours  · Decongestants  help loosen mucus in your lungs and make it easier to cough up  This can help you breathe easier  · Cough suppressants  decrease your urge to cough  If your cough produces mucus, do not take a cough suppressant unless your healthcare provider tells you to  Your healthcare provider may suggest that you take a cough suppressant at night so you can rest     · Inhalers  may be given  Your healthcare provider may give you one or more inhalers to help you breathe easier and cough less  An inhaler gives your medicine to open your airways  Ask your healthcare provider to show you how to use your inhaler correctly  Follow up with your healthcare provider as directed:  Write down questions you have so you will remember to ask them during your follow-up visits  © 2017 [facility] Information is for End User's use only and may not be sold, redistributed or otherwise used for commercial purposes  All illustrations and images included in CareNotes® are the copyrighted property of [employer]  or [employer]  The above information is an  only  It is not intended as medical advice for individual conditions or treatments  Talk to your doctor, nurse or pharmacist before following any medical regimen to see if it is safe and effective for you

## 2020-04-07 ENCOUNTER — OFFICE VISIT (OUTPATIENT)
Dept: URGENT CARE | Facility: CLINIC | Age: 55
End: 2020-04-07
Payer: COMMERCIAL

## 2020-04-07 ENCOUNTER — APPOINTMENT (OUTPATIENT)
Dept: RADIOLOGY | Facility: CLINIC | Age: 55
End: 2020-04-07
Payer: COMMERCIAL

## 2020-04-07 VITALS
BODY MASS INDEX: 45.48 KG/M2 | RESPIRATION RATE: 20 BRPM | SYSTOLIC BLOOD PRESSURE: 131 MMHG | TEMPERATURE: 98 F | HEART RATE: 84 BPM | OXYGEN SATURATION: 96 % | HEIGHT: 66 IN | WEIGHT: 283 LBS | DIASTOLIC BLOOD PRESSURE: 85 MMHG

## 2020-04-07 DIAGNOSIS — B96.89 ACUTE BACTERIAL BRONCHITIS: Primary | ICD-10-CM

## 2020-04-07 DIAGNOSIS — R05.9 COUGH: ICD-10-CM

## 2020-04-07 DIAGNOSIS — J20.8 ACUTE BACTERIAL BRONCHITIS: Primary | ICD-10-CM

## 2020-04-07 PROCEDURE — 71046 X-RAY EXAM CHEST 2 VIEWS: CPT

## 2020-04-07 PROCEDURE — 99213 OFFICE O/P EST LOW 20 MIN: CPT | Performed by: PHYSICIAN ASSISTANT

## 2020-04-07 RX ORDER — LEVOFLOXACIN 750 MG/1
750 TABLET ORAL EVERY 24 HOURS
Qty: 5 TABLET | Refills: 0 | Status: SHIPPED | OUTPATIENT
Start: 2020-04-07 | End: 2020-04-12

## 2020-04-07 RX ORDER — ALBUTEROL SULFATE 90 UG/1
2 AEROSOL, METERED RESPIRATORY (INHALATION) EVERY 6 HOURS PRN
Qty: 8.5 G | Refills: 0 | Status: SHIPPED | OUTPATIENT
Start: 2020-04-07 | End: 2021-03-29 | Stop reason: SDUPTHER

## 2020-04-07 RX ORDER — ALBUTEROL SULFATE 90 UG/1
2 AEROSOL, METERED RESPIRATORY (INHALATION) EVERY 6 HOURS PRN
COMMUNITY

## 2020-04-30 ENCOUNTER — APPOINTMENT (OUTPATIENT)
Dept: RADIOLOGY | Age: 55
End: 2020-04-30
Payer: OTHER MISCELLANEOUS

## 2020-04-30 ENCOUNTER — APPOINTMENT (OUTPATIENT)
Dept: URGENT CARE | Age: 55
End: 2020-04-30
Payer: OTHER MISCELLANEOUS

## 2020-04-30 DIAGNOSIS — S49.92XA INJURY OF LEFT SHOULDER, INITIAL ENCOUNTER: ICD-10-CM

## 2020-04-30 DIAGNOSIS — S49.92XA INJURY OF LEFT SHOULDER, INITIAL ENCOUNTER: Primary | ICD-10-CM

## 2020-04-30 PROCEDURE — 73030 X-RAY EXAM OF SHOULDER: CPT

## 2020-04-30 PROCEDURE — 99284 EMERGENCY DEPT VISIT MOD MDM: CPT | Performed by: PREVENTIVE MEDICINE

## 2020-04-30 PROCEDURE — G0383 LEV 4 HOSP TYPE B ED VISIT: HCPCS | Performed by: PREVENTIVE MEDICINE

## 2020-05-11 ENCOUNTER — APPOINTMENT (OUTPATIENT)
Dept: URGENT CARE | Age: 55
End: 2020-05-11
Payer: OTHER MISCELLANEOUS

## 2020-05-11 PROCEDURE — 99213 OFFICE O/P EST LOW 20 MIN: CPT | Performed by: PREVENTIVE MEDICINE

## 2020-05-21 ENCOUNTER — APPOINTMENT (OUTPATIENT)
Dept: URGENT CARE | Age: 55
End: 2020-05-21
Payer: OTHER MISCELLANEOUS

## 2020-05-21 PROCEDURE — 99213 OFFICE O/P EST LOW 20 MIN: CPT | Performed by: PREVENTIVE MEDICINE

## 2020-06-03 ENCOUNTER — OFFICE VISIT (OUTPATIENT)
Dept: OBGYN CLINIC | Facility: CLINIC | Age: 55
End: 2020-06-03
Payer: OTHER MISCELLANEOUS

## 2020-06-03 VITALS
TEMPERATURE: 98.6 F | DIASTOLIC BLOOD PRESSURE: 78 MMHG | BODY MASS INDEX: 44.32 KG/M2 | SYSTOLIC BLOOD PRESSURE: 126 MMHG | HEIGHT: 67 IN

## 2020-06-03 DIAGNOSIS — M25.512 CHRONIC LEFT SHOULDER PAIN: ICD-10-CM

## 2020-06-03 DIAGNOSIS — G89.29 CHRONIC LEFT SHOULDER PAIN: ICD-10-CM

## 2020-06-03 DIAGNOSIS — S46.012A TRAUMATIC INCOMPLETE TEAR OF LEFT ROTATOR CUFF, INITIAL ENCOUNTER: Primary | ICD-10-CM

## 2020-06-03 PROCEDURE — 20610 DRAIN/INJ JOINT/BURSA W/O US: CPT | Performed by: ORTHOPAEDIC SURGERY

## 2020-06-03 PROCEDURE — 1036F TOBACCO NON-USER: CPT | Performed by: ORTHOPAEDIC SURGERY

## 2020-06-03 PROCEDURE — 99213 OFFICE O/P EST LOW 20 MIN: CPT | Performed by: ORTHOPAEDIC SURGERY

## 2020-06-03 RX ORDER — NAPROXEN 500 MG/1
500 TABLET ORAL 2 TIMES DAILY WITH MEALS
Qty: 60 TABLET | Refills: 1 | Status: SHIPPED | OUTPATIENT
Start: 2020-06-03 | End: 2020-10-30

## 2020-06-03 RX ORDER — COVID-19 ANTIGEN TEST
1 KIT MISCELLANEOUS
COMMUNITY
End: 2020-10-29

## 2020-06-03 RX ORDER — LIDOCAINE HYDROCHLORIDE 10 MG/ML
4 INJECTION, SOLUTION INFILTRATION; PERINEURAL
Status: COMPLETED | OUTPATIENT
Start: 2020-06-03 | End: 2020-06-03

## 2020-06-03 RX ORDER — TRIAMCINOLONE ACETONIDE 40 MG/ML
40 INJECTION, SUSPENSION INTRA-ARTICULAR; INTRAMUSCULAR
Status: COMPLETED | OUTPATIENT
Start: 2020-06-03 | End: 2020-06-03

## 2020-06-03 RX ADMIN — TRIAMCINOLONE ACETONIDE 40 MG: 40 INJECTION, SUSPENSION INTRA-ARTICULAR; INTRAMUSCULAR at 12:19

## 2020-06-03 RX ADMIN — LIDOCAINE HYDROCHLORIDE 4 ML: 10 INJECTION, SOLUTION INFILTRATION; PERINEURAL at 12:19

## 2020-06-04 ENCOUNTER — EVALUATION (OUTPATIENT)
Dept: OCCUPATIONAL THERAPY | Facility: CLINIC | Age: 55
End: 2020-06-04
Payer: OTHER MISCELLANEOUS

## 2020-06-04 DIAGNOSIS — S46.012D TRAUMATIC INCOMPLETE TEAR OF LEFT ROTATOR CUFF, SUBSEQUENT ENCOUNTER: Primary | ICD-10-CM

## 2020-06-04 PROCEDURE — 97140 MANUAL THERAPY 1/> REGIONS: CPT

## 2020-06-04 PROCEDURE — 97535 SELF CARE MNGMENT TRAINING: CPT

## 2020-06-04 PROCEDURE — 97166 OT EVAL MOD COMPLEX 45 MIN: CPT

## 2020-06-08 ENCOUNTER — OFFICE VISIT (OUTPATIENT)
Dept: OCCUPATIONAL THERAPY | Facility: CLINIC | Age: 55
End: 2020-06-08
Payer: OTHER MISCELLANEOUS

## 2020-06-08 DIAGNOSIS — S46.012D TRAUMATIC INCOMPLETE TEAR OF LEFT ROTATOR CUFF, SUBSEQUENT ENCOUNTER: Primary | ICD-10-CM

## 2020-06-08 PROCEDURE — 97140 MANUAL THERAPY 1/> REGIONS: CPT

## 2020-06-08 PROCEDURE — 97110 THERAPEUTIC EXERCISES: CPT

## 2020-06-10 ENCOUNTER — OFFICE VISIT (OUTPATIENT)
Dept: OCCUPATIONAL THERAPY | Facility: CLINIC | Age: 55
End: 2020-06-10
Payer: OTHER MISCELLANEOUS

## 2020-06-10 DIAGNOSIS — S46.012D TRAUMATIC INCOMPLETE TEAR OF LEFT ROTATOR CUFF, SUBSEQUENT ENCOUNTER: Primary | ICD-10-CM

## 2020-06-10 PROCEDURE — 97110 THERAPEUTIC EXERCISES: CPT

## 2020-06-10 PROCEDURE — 97140 MANUAL THERAPY 1/> REGIONS: CPT

## 2020-06-15 ENCOUNTER — OFFICE VISIT (OUTPATIENT)
Dept: OCCUPATIONAL THERAPY | Facility: CLINIC | Age: 55
End: 2020-06-15
Payer: OTHER MISCELLANEOUS

## 2020-06-15 DIAGNOSIS — S46.012D TRAUMATIC INCOMPLETE TEAR OF LEFT ROTATOR CUFF, SUBSEQUENT ENCOUNTER: Primary | ICD-10-CM

## 2020-06-15 PROCEDURE — 97140 MANUAL THERAPY 1/> REGIONS: CPT

## 2020-06-15 PROCEDURE — 97110 THERAPEUTIC EXERCISES: CPT

## 2020-06-17 ENCOUNTER — OFFICE VISIT (OUTPATIENT)
Dept: OBGYN CLINIC | Facility: CLINIC | Age: 55
End: 2020-06-17
Payer: OTHER MISCELLANEOUS

## 2020-06-17 ENCOUNTER — OFFICE VISIT (OUTPATIENT)
Dept: OCCUPATIONAL THERAPY | Facility: CLINIC | Age: 55
End: 2020-06-17
Payer: OTHER MISCELLANEOUS

## 2020-06-17 VITALS
SYSTOLIC BLOOD PRESSURE: 132 MMHG | TEMPERATURE: 97.8 F | BODY MASS INDEX: 44.32 KG/M2 | DIASTOLIC BLOOD PRESSURE: 76 MMHG | HEIGHT: 67 IN

## 2020-06-17 DIAGNOSIS — S46.012A TRAUMATIC INCOMPLETE TEAR OF LEFT ROTATOR CUFF, INITIAL ENCOUNTER: Primary | ICD-10-CM

## 2020-06-17 DIAGNOSIS — S46.012D TRAUMATIC INCOMPLETE TEAR OF LEFT ROTATOR CUFF, SUBSEQUENT ENCOUNTER: Primary | ICD-10-CM

## 2020-06-17 PROCEDURE — 99213 OFFICE O/P EST LOW 20 MIN: CPT | Performed by: ORTHOPAEDIC SURGERY

## 2020-06-17 PROCEDURE — 97110 THERAPEUTIC EXERCISES: CPT

## 2020-06-17 PROCEDURE — 1036F TOBACCO NON-USER: CPT | Performed by: ORTHOPAEDIC SURGERY

## 2020-06-22 ENCOUNTER — OFFICE VISIT (OUTPATIENT)
Dept: OCCUPATIONAL THERAPY | Facility: CLINIC | Age: 55
End: 2020-06-22
Payer: OTHER MISCELLANEOUS

## 2020-06-22 DIAGNOSIS — S46.012D TRAUMATIC INCOMPLETE TEAR OF LEFT ROTATOR CUFF, SUBSEQUENT ENCOUNTER: Primary | ICD-10-CM

## 2020-06-22 PROCEDURE — 97110 THERAPEUTIC EXERCISES: CPT

## 2020-06-24 ENCOUNTER — OFFICE VISIT (OUTPATIENT)
Dept: OCCUPATIONAL THERAPY | Facility: CLINIC | Age: 55
End: 2020-06-24
Payer: OTHER MISCELLANEOUS

## 2020-06-24 DIAGNOSIS — S46.012D TRAUMATIC INCOMPLETE TEAR OF LEFT ROTATOR CUFF, SUBSEQUENT ENCOUNTER: Primary | ICD-10-CM

## 2020-06-24 PROCEDURE — 97110 THERAPEUTIC EXERCISES: CPT

## 2020-06-29 ENCOUNTER — OFFICE VISIT (OUTPATIENT)
Dept: OCCUPATIONAL THERAPY | Facility: CLINIC | Age: 55
End: 2020-06-29
Payer: OTHER MISCELLANEOUS

## 2020-06-29 DIAGNOSIS — S46.012D TRAUMATIC INCOMPLETE TEAR OF LEFT ROTATOR CUFF, SUBSEQUENT ENCOUNTER: Primary | ICD-10-CM

## 2020-06-29 PROCEDURE — 97110 THERAPEUTIC EXERCISES: CPT

## 2020-07-01 ENCOUNTER — APPOINTMENT (OUTPATIENT)
Dept: OCCUPATIONAL THERAPY | Facility: CLINIC | Age: 55
End: 2020-07-01
Payer: OTHER MISCELLANEOUS

## 2020-07-08 ENCOUNTER — EVALUATION (OUTPATIENT)
Dept: OCCUPATIONAL THERAPY | Facility: CLINIC | Age: 55
End: 2020-07-08
Payer: OTHER MISCELLANEOUS

## 2020-07-08 DIAGNOSIS — S46.012D TRAUMATIC INCOMPLETE TEAR OF LEFT ROTATOR CUFF, SUBSEQUENT ENCOUNTER: Primary | ICD-10-CM

## 2020-07-08 PROCEDURE — 97168 OT RE-EVAL EST PLAN CARE: CPT

## 2020-07-08 PROCEDURE — 97110 THERAPEUTIC EXERCISES: CPT

## 2020-07-08 PROCEDURE — 97535 SELF CARE MNGMENT TRAINING: CPT

## 2020-07-08 NOTE — PROGRESS NOTES
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  She was performing a lift of a patient when the patient resisted and she heard a pop in her shoulder  Patient had an MRI completed at that time with a near complete tear of the supraspinatus and signs of previous smaller tears  Over the past month she was completing home exercises  Patient has returned to work on light duty  Patient had Ortho appointment on 2020 in which she received a cortisone shot  Patient presenting with decreased ROM, strength and functional use  Re-assessment completed on 2020  Patient has consistently attended OP OT services since the start of care  Patient has follow-up with Ortho on 2020  Patient demonstrating with increased ROM, strength and functional use since start of care  Patient demonstrating with WNL for all shoulder motions with pain noted  Patient educated that pain is to be expected due to tear  However, she can continue with exercises at home to maintain current ROM  Patient in agreement with recommendation and will transition into HEP     Impairments: abnormal coordination, abnormal or restricted ROM and impaired physical strength  Other impairment: decreased functional use    Symptom irritability: moderateBarriers to therapy: Past Medical History:  No date: Arthritis  No date: Endometrial adenocarcinoma ([facility] [address] )  No date: Endometriosis  No date: Facial twitching      Comment:  2016 resolved  No date: Hypertension  No date: Migraine  No date: Obesity  No date: Thyroid disease  Understanding of Dx/Px/POC: good   Prognosis: good    Goals  STGs    Pt will increase  strength by 5-10#  - Met    Pt will increase shoulder strength by 1/2 grade  -  Met    Pt will increase shoulder ROM by 50%  - Met    Independent with HEP  - Met      LTGs     Pt will increase  strength by an additional 5-10#  - Met    Pt will increase shoulder strength by 1-2 grade  -  Met    Pt will increase shoulder ROM to [facility]  - Met    Pt will report an increase in ADL/IADL participation  - Met          Plan  Plan details: Patient has Consistent attended OP OT services since start of care  Patient has made steady progress towards established goals  Pt has shown improvement start of care, demonstrating decreased pain, increased strength, increased ROM and increased activity    Patient and therapist discussed discontinued and have agreed on Plan of Care  Patient to be discharged to an independent Home Exercise Program  Thank you for the referral  Please refer to patient's last assessment for most recent objective measurements           Patient would benefit from: OT eval and skilled occupational therapy  Referral necessary: Yes  Planned modality interventions: ultrasound, unattended electrical stimulation, thermotherapy: hydrocollator packs, cryotherapy and thermotherapy: paraffin bath  Planned therapy interventions: massage, manual therapy, joint mobilization, patient education, strengthening, stretching, fine motor coordination training, home exercise program, neuromuscular re-education, self care, therapeutic exercise and therapeutic activities  Treatment plan discussed with: patient        Subjective Evaluation    History of Present Illness  Date of onset: 2020  Mechanism of injury: trauma  Mechanism of injury: L Rotator cuff tear  Quality of life: good    Pain  Current pain ratin  At best pain ratin  At worst pain rating: 3  Location: L Shoulder  Quality: sharp    Hand dominance: right      Diagnostic Tests  MRI studies: abnormal  Treatments  Current treatment: occupational therapy  Patient Goals  Patient goals for therapy: decreased pain, increased motion, increased strength and independence with ADLs/IADLs          Objective     Active Range of Motion   Left Shoulder   Flexion: 160 degrees with pain  Extension: 50 degrees   Abduction: 180 degrees with pain  Adduction: 0 degrees   External rotation 0°: 65 degrees   Internal rotation 0°: 70 degrees     Right Shoulder   Normal active range of motion    Left Elbow   Normal active range of motion    Right Elbow   Normal active range of motion    Additional Active Range of Motion Details  Patient demonstrating with WNL for all ROM  Patient does note pain during flexion and ABD at 120, but is able to move arm past that point  Passive Range of Motion   Left Shoulder   Flexion: WFL  Extension: WFL  Abduction: WFL  Adduction: WFL  External rotation 0°: WFL  External rotation 90°: WFL  Internal rotation 0°: WFL  Internal rotation 90°: WFL    Strength/Myotome Testing     Left Shoulder     Planes of Motion   Flexion: 3+   Extension: 4-   Abduction: 3+   Adduction: 4-   External rotation at 0°: 3+   Internal rotation at 0°: 4-     Right Shoulder   Normal muscle strength    Left Elbow   Flexion: 4  Extension: 4  Forearm supination: 4  Forearm pronation: 4    Right Elbow   Normal strength    Left Wrist/Hand   Normal wrist strength     (2nd hand position)     Trial 1: 65    Right Wrist/Hand   Normal wrist strength     (2nd hand position)     Trial 1: 65    Additional Strength Details  Increased  strength noted                   Subjective: "It feels really good "      Objective: See treatment diary below      Assessment: Tolerated treatment well  Patient and therapist reviewed final HEP this session  Patient demonstrated proper techniques for all exercises as per plan of care   Patient verbalized understanding of exercises and rationale  All questions and concerns by patient regarding final HEP were answered at this time  Patient demonstrating with WNL for all shoulder motions with pain noted  Patient educated that pain is to be expected due to tear  However, she can continue with exercises at home to maintain current ROM  Plan: D/C OT services        Precautions: COVID-19 Precautions      Manuals 06/22/2020 06/24/2020 06/29/2020 07/08/2020    Shoulder PROM        AAROM                        Neuro Re-Ed                                                                Ther Ex 06/22/2020 06/24/2020 06/29/2020 07/08/2020    Isometrics  FF  EXT  IR  ER   5 sec x 10  5 sec x 10  5 sec x 10  5 sec x 10   5 sec x 10  5 sec x 10  5 sec x 10  5 sec x 10   5 sec x 10  5 sec x 10  5 sec x 10  5 sec x 10   5 sec x 10  5 sec x 10  5 sec x 10  5 sec x 10    Supine SPC   FF  CP  ER          Pendulums  FF  Side  Circles 4#   X 20  X 20  X 20 4#  X 20  X 20  X 20 5#  X 20  X 20  X 20 5#  X 20  X 20  X 20    Biceps Curl 4# x 20 5# x 20 5# x 20 5# x 20    Shoulder Pulleys  FF  ABD   X 20  X 20   X 20  X 20   X 20  X 20   X 20  X 20    Bent Over Row  Ext 4# 2 x 10  4# 2 x 10  5# 2 x 10  5# 2 x 10 5# 2 x 10  5# 2 x 10  5# 2 x 10  5# 2 x 10    Wall Slides FF x 10  ABD x 5 FF x 10  ABD x 5 FF x 10  ABD x 5 FF x 10  ABD x 5    SPC   ABD  IR BHB  Ext   X 20  X 20  X 20   X 20  X 20  X 20     X 20  X 20  X 20   X 20  X 20  X 20    Shoulder   Shrugs  Retractions   4# 2 x 10  4# 2 x 10   5# 2 x 10  5# 2 x 10   5# 2 x 10  5# 2 x 10   5# 2 x 10  5# 2 x 10    Thera-Band  Retractions  IR  ER  Ext  Triceps Extension  CP Green   X 20  X 20  X 20  X 20 Blue   X 20  X 20  X 20  X 20 Blue  X 20  X 20  X 20  X 20   X 20  X 20 Black  X 20  X 20  X 20  X 20  X 20  X 20    UBE Machine 10 Min 80 Resistance 10 Min 80 Resistance 10 Min 80 Resistance 10 Min 80 Resistance    Low Doorway Stretch  30 sec x 3 30 sec x 3 30 sec x 3                    Modalities 06/20/2020 06/24/2020 06/29/2020 07/08/2020    [address] [place] Deferred Deferred Deferred

## 2020-07-21 ENCOUNTER — OFFICE VISIT (OUTPATIENT)
Dept: OBGYN CLINIC | Facility: CLINIC | Age: 55
End: 2020-07-21
Payer: OTHER MISCELLANEOUS

## 2020-07-21 VITALS — TEMPERATURE: 97.4 F | DIASTOLIC BLOOD PRESSURE: 79 MMHG | SYSTOLIC BLOOD PRESSURE: 140 MMHG | HEART RATE: 74 BPM

## 2020-07-21 DIAGNOSIS — S46.012D TRAUMATIC INCOMPLETE TEAR OF LEFT ROTATOR CUFF, SUBSEQUENT ENCOUNTER: Primary | ICD-10-CM

## 2020-07-21 PROBLEM — S93.492A SPRAIN OF ANTERIOR TALOFIBULAR LIGAMENT OF LEFT ANKLE: Status: RESOLVED | Noted: 2018-01-12 | Resolved: 2020-07-21

## 2020-07-21 PROBLEM — S83.204A: Status: RESOLVED | Noted: 2019-05-07 | Resolved: 2020-07-21

## 2020-07-21 PROCEDURE — 1036F TOBACCO NON-USER: CPT | Performed by: ORTHOPAEDIC SURGERY

## 2020-07-21 PROCEDURE — 99213 OFFICE O/P EST LOW 20 MIN: CPT | Performed by: ORTHOPAEDIC SURGERY

## 2020-07-21 NOTE — ASSESSMENT & PLAN NOTE
34-year-old female with a left shoulder  complete rotator cuff tear  She still has a fair amount of weakness  I have recommended no heavy lifting at work, no more than 5 lb  She will continue her home exercise program   She will follow up with me in six weeks  If she is failing to have significant improvement at that time we will consider scheduling surgery if she was interested

## 2020-07-21 NOTE — PROGRESS NOTES
Assessment:     1  Traumatic incomplete tear of left rotator cuff, subsequent encounter          Plan:     Problem List Items Addressed This Visit        Musculoskeletal and Integument    Traumatic incomplete tear of left rotator cuff - Primary     53-year-old female with a left shoulder  complete rotator cuff tear  She still has a fair amount of weakness  I have recommended no heavy lifting at work, no more than 5 lb  She will continue her home exercise program   She will follow up with me in six weeks  If she is failing to have significant improvement at that time we will consider scheduling surgery if she was interested  Patient ID: Jarret Trejo is a 63 y o  female  Chief Complaint:  Left shoulder follow up    HPI:    53-year-old female with a known left near complete rotator cuff tear  She had a cortisone injection by one of my partner six weeks ago  She still complains of pain any time he is doing overhead activity  She has had significant improvement in her overall range of motion  She has pain that radiates down the lateral side of her shoulder and arm when she is doing activities  She has been working on a home exercise program since graduating from therapy        Allergy:  No Known Allergies    Medications:  all current active meds have been reviewed    Past Medical History:  Past Medical History:   Diagnosis Date    Arthritis     [facility]ometrial adenocarcinoma ([facility] [address] )     Endometriosis     Facial twitching     06jun2016 resolved    Hypertension     Migraine     Obesity     Thyroid disease        Past Surgical History:  Past Surgical History:   Procedure Laterality Date    ANKLE FRACTURE SURGERY Left     ANKLE SURGERY      APPENDECTOMY      CHOLECYSTECTOMY      HYSTERECTOMY      HYSTEROSCOPY      OOPHORECTOMY      UT KNEE SCOPE,MED/LAT MENISECTOMY Left 6/3/2019    Procedure: KNEE ARTHROSOCPY; partial medial meniscectomy and debridement, synovectomy, chondroplasty;  Surgeon: Shlomo Norman MD;  Location: [facility];  Service: Orthopedics       Family History:  Family History   Problem Relation Age of Onset    Clotting disorder Sister         possible, history of multiple blood clots    Diabetes Father     Hypertension Father     Cancer Father         bladder cancer    Hypotension Family     No Known Problems Maternal Grandfather     Liver cancer Paternal Grandmother     Colon cancer Paternal Grandmother     No Known Problems Daughter     No Known Problems Maternal Grandmother     No Known Problems Daughter     Breast cancer additional onset Maternal Aunt     Bone cancer Maternal Aunt     No Known Problems Paternal Aunt        Social History:  Social History     Substance and Sexual Activity   Alcohol Use Yes    Comment: OCASSIONAL     Social History     Substance and Sexual Activity   Drug Use No     Social History     Tobacco Use   Smoking Status Never Smoker   Smokeless Tobacco Never Used   Tobacco Comment    former smoker in all scripts           ROS:  Review of Systems   Musculoskeletal: Positive for arthralgias  All other systems reviewed and are negative  Objective:  BP Readings from Last 1 Encounters:   07/21/20 140/79      Wt Readings from Last 1 Encounters:   04/07/20 128 kg (283 lb)        BMI:   Estimated body mass index is 44 32 kg/m² as calculated from the following:    Height as of 6/17/20: 5' 7" (1 702 m)  Weight as of 4/7/20: 128 kg (283 lb)  EXAM:   Physical Exam  Left Shoulder Exam     Comments:  Diffuse tenderness throughout the entire shoulder including the trapezial and periscapular muscles  Patient has full active forward flexion, external rotation and internal rotation  She has 3/5 strength with supraspinatus, 4-5 strength with infraspinatus and subscap  Mild crepitus with range of motion  Positive impingement                Radiographs:  I have personally reviewed pertinent films in PACS and my interpretation is near full thickness tear of supraspinatus

## 2020-07-21 NOTE — LETTER
July 21, 2020     Patient: Bharath Conroy   YOB: 1965   Date of Visit: 7/21/2020       To Whom it May Concern:    Bharath Conory is under my professional care  She was seen in my office on 7/21/2020  She may return to work with limitations no lifting more than 5 lbs  If you have any questions or concerns, please don't hesitate to call           Sincerely,          Augustine Salguero MD        CC: No Recipients

## 2020-08-15 ENCOUNTER — OCCMED (OUTPATIENT)
Dept: URGENT CARE | Age: 55
End: 2020-08-15
Payer: OTHER MISCELLANEOUS

## 2020-08-15 ENCOUNTER — APPOINTMENT (OUTPATIENT)
Dept: RADIOLOGY | Age: 55
End: 2020-08-15
Payer: OTHER MISCELLANEOUS

## 2020-08-15 DIAGNOSIS — M25.551 RIGHT HIP PAIN: ICD-10-CM

## 2020-08-15 DIAGNOSIS — M54.6 ACUTE THORACIC BACK PAIN, UNSPECIFIED BACK PAIN LATERALITY: ICD-10-CM

## 2020-08-15 DIAGNOSIS — R07.81 RIB PAIN: ICD-10-CM

## 2020-08-15 DIAGNOSIS — M25.511 ACUTE PAIN OF RIGHT SHOULDER: ICD-10-CM

## 2020-08-15 DIAGNOSIS — M25.511 ACUTE PAIN OF RIGHT SHOULDER: Primary | ICD-10-CM

## 2020-08-15 PROCEDURE — 73030 X-RAY EXAM OF SHOULDER: CPT

## 2020-08-15 PROCEDURE — 73502 X-RAY EXAM HIP UNI 2-3 VIEWS: CPT

## 2020-08-15 PROCEDURE — 72072 X-RAY EXAM THORAC SPINE 3VWS: CPT

## 2020-08-15 PROCEDURE — 71101 X-RAY EXAM UNILAT RIBS/CHEST: CPT

## 2020-08-15 PROCEDURE — 99213 OFFICE O/P EST LOW 20 MIN: CPT | Performed by: PHYSICIAN ASSISTANT

## 2020-08-17 ENCOUNTER — APPOINTMENT (OUTPATIENT)
Dept: URGENT CARE | Age: 55
End: 2020-08-17
Payer: OTHER MISCELLANEOUS

## 2020-08-17 PROCEDURE — 99213 OFFICE O/P EST LOW 20 MIN: CPT | Performed by: PHYSICIAN ASSISTANT

## 2020-09-01 ENCOUNTER — OFFICE VISIT (OUTPATIENT)
Dept: OBGYN CLINIC | Facility: CLINIC | Age: 55
End: 2020-09-01
Payer: OTHER MISCELLANEOUS

## 2020-09-01 VITALS
DIASTOLIC BLOOD PRESSURE: 72 MMHG | BODY MASS INDEX: 45.99 KG/M2 | WEIGHT: 293 LBS | SYSTOLIC BLOOD PRESSURE: 124 MMHG | HEIGHT: 67 IN | TEMPERATURE: 98.3 F

## 2020-09-01 DIAGNOSIS — S46.012D TRAUMATIC INCOMPLETE TEAR OF LEFT ROTATOR CUFF, SUBSEQUENT ENCOUNTER: Primary | ICD-10-CM

## 2020-09-01 PROCEDURE — 99215 OFFICE O/P EST HI 40 MIN: CPT | Performed by: ORTHOPAEDIC SURGERY

## 2020-09-01 RX ORDER — SODIUM CHLORIDE, SODIUM LACTATE, POTASSIUM CHLORIDE, CALCIUM CHLORIDE 600; 310; 30; 20 MG/100ML; MG/100ML; MG/100ML; MG/100ML
125 INJECTION, SOLUTION INTRAVENOUS CONTINUOUS
Status: CANCELLED | OUTPATIENT
Start: 2020-09-01

## 2020-09-01 RX ORDER — CHLORHEXIDINE GLUCONATE 4 G/100ML
SOLUTION TOPICAL DAILY PRN
Status: CANCELLED | OUTPATIENT
Start: 2020-09-01

## 2020-09-01 RX ORDER — CEFAZOLIN SODIUM 2 G/50ML
2000 SOLUTION INTRAVENOUS ONCE
Status: CANCELLED | OUTPATIENT
Start: 2020-09-23 | End: 2020-09-01

## 2020-09-01 NOTE — PROGRESS NOTES
Assessment:     1  Traumatic incomplete tear of left rotator cuff, subsequent encounter          Plan:     Problem List Items Addressed This Visit        Musculoskeletal and Integument    Traumatic incomplete tear of left rotator cuff - Primary     43-year-old female with a left partial or complete rotator cuff tear  She continues to have significant weakness spine injections in aggressive physical therapy and doing home exercises  She is very frustrated with where she that and the fact that she is not making progress  We discussed possibilities of surgical intervention  She wished to proceed with surgery  Risks and benefits were discussed with her and informed consent was obtained  We discussed what to expect postoperatively and in terms of the length and timing of the recovery  She shows good understanding of this  She was given a shoulder abduction sling today  Her preoperative paperwork was completed  Relevant Orders    Case request operating room: REPAIR ROTATOR CUFF  ARTHROSCOPIC (Completed)    Sling           Patient ID: Tayla Man is a 63 y o  female  Chief Complaint:  Left shoulder follow up    HPI:    43-year-old female with a known left near complete rotator cuff tear  She had a cortisone injection in early June  She still complains of pain any time she is doing overhead activity  And while her shoulder motion has improved significantly with physical therapy, her main complaint is the weakness with any overhead activity  She states she has dropped broken multiple bowls and cups because she is unable to control her shoulder on the way down  She is very frustrated by where things are at now it is wanting to see what else can be done        Allergy:  No Known Allergies    Medications:  all current active meds have been reviewed    Past Medical History:  Past Medical History:   Diagnosis Date    Arthritis     [facility]ometrial adenocarcinoma ([facility] [address]     Endometriosis     Facial twitching     06jun2016 resolved    Hypertension     Migraine     Obesity     Thyroid disease        Past Surgical History:  Past Surgical History:   Procedure Laterality Date    ANKLE FRACTURE SURGERY Left     ANKLE SURGERY      APPENDECTOMY      CHOLECYSTECTOMY      HYSTERECTOMY      HYSTEROSCOPY      OOPHORECTOMY      [place] KNEE SCOPE,MED/LAT MENISECTOMY Left 6/3/2019    Procedure: KNEE ARTHROSOCPY; partial medial meniscectomy and debridement, synovectomy, chondroplasty;  Surgeon: Theresa Wynne MD;  Location: [facility];  Service: Orthopedics       Family History:  Family History   Problem Relation Age of Onset    Clotting disorder Sister         possible, history of multiple blood clots    Diabetes Father     Hypertension Father     Cancer Father         bladder cancer    Hypotension Family     No Known Problems Maternal Grandfather     Liver cancer Paternal Grandmother     Colon cancer Paternal Grandmother     No Known Problems Daughter     No Known Problems Maternal Grandmother     No Known Problems Daughter     Breast cancer additional onset Maternal Aunt     Bone cancer Maternal Aunt     No Known Problems Paternal Aunt        Social History:  Social History     Substance and Sexual Activity   Alcohol Use Yes    Comment: OCASSIONAL     Social History     Substance and Sexual Activity   Drug Use No     Social History     Tobacco Use   Smoking Status Never Smoker   Smokeless Tobacco Never Used   Tobacco Comment    former smoker in all scripts           ROS:  Review of Systems   Musculoskeletal: Positive for arthralgias  All other systems reviewed and are negative  Objective:  BP Readings from Last 1 Encounters:   09/01/20 124/72      Wt Readings from Last 1 Encounters:   09/01/20 133 kg (294 lb)        BMI:   Estimated body mass index is 46 05 kg/m² as calculated from the following:    Height as of this encounter: 5' 7" (1 702 m)      Weight as of this encounter: 133 kg (294 lb)     EXAM:   Physical Exam  Vitals signs reviewed  Constitutional:       General: She is not in acute distress  Appearance: She is well-developed  She is not diaphoretic  HENT:      Head: Normocephalic and atraumatic  Eyes:      General:         Right eye: No discharge  Left eye: No discharge  Neck:      Musculoskeletal: Normal range of motion and neck supple  Trachea: No tracheal deviation  Cardiovascular:      Rate and Rhythm: Normal rate and regular rhythm  Pulmonary:      Effort: Pulmonary effort is normal  No respiratory distress  Breath sounds: Normal breath sounds  Chest:      Chest wall: No tenderness  Abdominal:      General: There is no distension  Palpations: Abdomen is soft  Tenderness: There is no abdominal tenderness  Skin:     General: Skin is warm and dry  Findings: No erythema  Neurological:      Mental Status: She is alert  Psychiatric:         Behavior: Behavior normal        Left Shoulder Exam     Comments:  Diffuse tenderness throughout the entire shoulder including the trapezial and periscapular muscles  Patient has full active forward flexion, external rotation and internal rotation  She has 3/5 strength with supraspinatus, 4-5 strength with infraspinatus and subscap  Mild crepitus with range of motion  Positive impingement  Radiographs:  I have personally reviewed pertinent films in PACS and my interpretation is near full thickness tear of supraspinatus

## 2020-09-01 NOTE — H&P (VIEW-ONLY)
Assessment:     1  Traumatic incomplete tear of left rotator cuff, subsequent encounter          Plan:     Problem List Items Addressed This Visit        Musculoskeletal and Integument    Traumatic incomplete tear of left rotator cuff - Primary     58-year-old female with a left partial or complete rotator cuff tear  She continues to have significant weakness spine injections in aggressive physical therapy and doing home exercises  She is very frustrated with where she that and the fact that she is not making progress  We discussed possibilities of surgical intervention  She wished to proceed with surgery  Risks and benefits were discussed with her and informed consent was obtained  We discussed what to expect postoperatively and in terms of the length and timing of the recovery  She shows good understanding of this  She was given a shoulder abduction sling today  Her preoperative paperwork was completed  Relevant Orders    Case request operating room: REPAIR ROTATOR CUFF  ARTHROSCOPIC (Completed)    Sling           Patient ID: Radha Menon is a 63 y o  female  Chief Complaint:  Left shoulder follow up    HPI:    58-year-old female with a known left near complete rotator cuff tear  She had a cortisone injection in early June  She still complains of pain any time she is doing overhead activity  And while her shoulder motion has improved significantly with physical therapy, her main complaint is the weakness with any overhead activity  She states she has dropped broken multiple bowls and cups because she is unable to control her shoulder on the way down  She is very frustrated by where things are at now it is wanting to see what else can be done        Allergy:  No Known Allergies    Medications:  all current active meds have been reviewed    Past Medical History:  Past Medical History:   Diagnosis Date    Arthritis     Endometrial adenocarcinoma ([address] )     Endometriosis     Facial twitching     06jun2016 resolved    Hypertension     Migraine     Obesity     Thyroid disease        Past Surgical History:  Past Surgical History:   Procedure Laterality Date    ANKLE FRACTURE SURGERY Left     ANKLE SURGERY      APPENDECTOMY      CHOLECYSTECTOMY      HYSTERECTOMY      HYSTEROSCOPY      OOPHORECTOMY      [place] KNEE SCOPE,MED/LAT MENISECTOMY Left 6/3/2019    Procedure: KNEE ARTHROSOCPY; partial medial meniscectomy and debridement, synovectomy, chondroplasty;  Surgeon: Leeann Franco MD;  Location: [facility];  Service: Orthopedics       Family History:  Family History   Problem Relation Age of Onset    Clotting disorder Sister         possible, history of multiple blood clots    Diabetes Father     Hypertension Father     Cancer Father         bladder cancer    Hypotension Family     No Known Problems Maternal Grandfather     Liver cancer Paternal Grandmother     Colon cancer Paternal Grandmother     No Known Problems Daughter     No Known Problems Maternal Grandmother     No Known Problems Daughter     Breast cancer additional onset Maternal Aunt     Bone cancer Maternal Aunt     No Known Problems Paternal Aunt        Social History:  Social History     Substance and Sexual Activity   Alcohol Use Yes    Comment: OCASSIONAL     Social History     Substance and Sexual Activity   Drug Use No     Social History     Tobacco Use   Smoking Status Never Smoker   Smokeless Tobacco Never Used   Tobacco Comment    former smoker in all scripts           ROS:  Review of Systems   Musculoskeletal: Positive for arthralgias  All other systems reviewed and are negative  Objective:  BP Readings from Last 1 Encounters:   09/01/20 124/72      Wt Readings from Last 1 Encounters:   09/01/20 133 kg (294 lb)        BMI:   Estimated body mass index is 46 05 kg/m² as calculated from the following:    Height as of this encounter: 5' 7" (1 702 m)      Weight as of this encounter: 133 kg (294 lb)     EXAM:   Physical Exam  Vitals signs reviewed  Constitutional:       General: She is not in acute distress  Appearance: She is well-developed  She is not diaphoretic  HENT:      Head: Normocephalic and atraumatic  Eyes:      General:         Right eye: No discharge  Left eye: No discharge  Neck:      Musculoskeletal: Normal range of motion and neck supple  Trachea: No tracheal deviation  Cardiovascular:      Rate and Rhythm: Normal rate and regular rhythm  Pulmonary:      Effort: Pulmonary effort is normal  No respiratory distress  Breath sounds: Normal breath sounds  Chest:      Chest wall: No tenderness  Abdominal:      General: There is no distension  Palpations: Abdomen is soft  Tenderness: There is no abdominal tenderness  Skin:     General: Skin is warm and dry  Findings: No erythema  Neurological:      Mental Status: She is alert  Psychiatric:         Behavior: Behavior normal        Left Shoulder Exam     Comments:  Diffuse tenderness throughout the entire shoulder including the trapezial and periscapular muscles  Patient has full active forward flexion, external rotation and internal rotation  She has 3/5 strength with supraspinatus, 4-5 strength with infraspinatus and subscap  Mild crepitus with range of motion  Positive impingement  Radiographs:  I have personally reviewed pertinent films in PACS and my interpretation is near full thickness tear of supraspinatus

## 2020-09-01 NOTE — ASSESSMENT & PLAN NOTE
48-year-old female with a left partial or complete rotator cuff tear  She continues to have significant weakness spine injections in aggressive physical therapy and doing home exercises  She is very frustrated with where she that and the fact that she is not making progress  We discussed possibilities of surgical intervention  She wished to proceed with surgery  Risks and benefits were discussed with her and informed consent was obtained  We discussed what to expect postoperatively and in terms of the length and timing of the recovery  She shows good understanding of this  She was given a shoulder abduction sling today  Her preoperative paperwork was completed

## 2020-09-11 NOTE — PATIENT INSTRUCTIONS

## 2020-09-17 NOTE — PRE-PROCEDURE INSTRUCTIONS
Pre-Surgery Instructions:   Medication Instructions    albuterol (ProAir HFA) 90 mcg/act inhaler Instructed patient per Anesthesia Guidelines   KRILL OIL PO Instructed patient per Anesthesia Guidelines   Multiple Vitamin (MULTIVITAMIN) tablet Instructed patient per Anesthesia Guidelines   naproxen (NAPROSYN) 500 mg tablet Instructed patient per Anesthesia Guidelines   Omega-3 Fatty Acids (FISH OIL) 1,000 mg Patient was instructed to contact Physician for medication instruction   Vitamin D, Cholecalciferol, 1000 UNITS TABS Instructed patient per Anesthesia Guidelines

## 2020-09-22 ENCOUNTER — ANESTHESIA EVENT (OUTPATIENT)
Dept: PERIOP | Facility: HOSPITAL | Age: 55
End: 2020-09-22
Payer: OTHER MISCELLANEOUS

## 2020-09-22 NOTE — ANESTHESIA PREPROCEDURE EVALUATION
Procedure:  REPAIR ROTATOR CUFF  ARTHROSCOPIC (Left Shoulder)    Relevant Problems   CARDIO   (+) Paroxysmal atrial fibrillation (HCC)      ENDO  Morbid obesity   (+) Hypothyroidism      MUSCULOSKELETAL   (+) Chronic midline low back pain without sciatica   (+) Degenerative arthritis of cervical spine      NEURO/PSYCH   (+) Chronic left shoulder pain   (+) Frequent headaches   (+) History of endometrial cancer   (+) Paresthesias      PULMONARY   (+) Obstructive sleep apnea      Other   (+) Obesity due to excess calories   (+) Pulmonary nodule        Physical Exam    Airway    Mallampati score: II  TM Distance: >3 FB  Neck ROM: full     Dental       Cardiovascular      Pulmonary      Other Findings        Anesthesia Plan  ASA Score- 3     Anesthesia Type- general and regional with ASA Monitors  Additional Monitors: arterial line  Airway Plan: ETT  Plan Factors-    Chart reviewed  Patient summary reviewed  Induction-     Postoperative Plan-     Informed Consent- Anesthetic plan and risks discussed with patient  I personally reviewed this patient with the CRNA  Discussed and agreed on the Anesthesia Plan with the CRNA  Karrie Nissen

## 2020-09-23 ENCOUNTER — ANESTHESIA (OUTPATIENT)
Dept: PERIOP | Facility: HOSPITAL | Age: 55
End: 2020-09-23
Payer: OTHER MISCELLANEOUS

## 2020-09-23 ENCOUNTER — HOSPITAL ENCOUNTER (OUTPATIENT)
Facility: HOSPITAL | Age: 55
Setting detail: OUTPATIENT SURGERY
Discharge: HOME | End: 2020-09-23
Attending: ORTHOPAEDIC SURGERY | Admitting: ORTHOPAEDIC SURGERY
Payer: OTHER MISCELLANEOUS

## 2020-09-23 VITALS
HEART RATE: 87 BPM | TEMPERATURE: 97.4 F | HEIGHT: 67 IN | WEIGHT: 293 LBS | SYSTOLIC BLOOD PRESSURE: 152 MMHG | BODY MASS INDEX: 45.99 KG/M2 | OXYGEN SATURATION: 95 % | DIASTOLIC BLOOD PRESSURE: 76 MMHG | RESPIRATION RATE: 20 BRPM

## 2020-09-23 VITALS — HEART RATE: 97 BPM

## 2020-09-23 DIAGNOSIS — S46.012D TRAUMATIC COMPLETE TEAR OF LEFT ROTATOR CUFF, SUBSEQUENT ENCOUNTER: Primary | ICD-10-CM

## 2020-09-23 PROCEDURE — C9290 INJ, BUPIVACAINE LIPOSOME: HCPCS | Performed by: ANESTHESIOLOGY

## 2020-09-23 PROCEDURE — C1713 ANCHOR/SCREW BN/BN,TIS/BN: HCPCS | Performed by: ORTHOPAEDIC SURGERY

## 2020-09-23 PROCEDURE — 29827 SHO ARTHRS SRG RT8TR CUF RPR: CPT | Performed by: PHYSICIAN ASSISTANT

## 2020-09-23 PROCEDURE — 29827 SHO ARTHRS SRG RT8TR CUF RPR: CPT | Performed by: ORTHOPAEDIC SURGERY

## 2020-09-23 DEVICE — SPEEDSCREW 5.5 MM IMPLANT
Type: IMPLANTABLE DEVICE | Site: SHOULDER | Status: FUNCTIONAL
Brand: SPEEDSCREW

## 2020-09-23 RX ORDER — SODIUM CHLORIDE 9 MG/ML
INJECTION, SOLUTION INTRAVENOUS AS NEEDED
Status: DISCONTINUED | OUTPATIENT
Start: 2020-09-23 | End: 2020-09-23 | Stop reason: HOSPADM

## 2020-09-23 RX ORDER — FENTANYL CITRATE 50 UG/ML
INJECTION, SOLUTION INTRAMUSCULAR; INTRAVENOUS AS NEEDED
Status: DISCONTINUED | OUTPATIENT
Start: 2020-09-23 | End: 2020-09-23

## 2020-09-23 RX ORDER — LIDOCAINE HYDROCHLORIDE 10 MG/ML
INJECTION, SOLUTION EPIDURAL; INFILTRATION; INTRACAUDAL; PERINEURAL AS NEEDED
Status: DISCONTINUED | OUTPATIENT
Start: 2020-09-23 | End: 2020-09-23

## 2020-09-23 RX ORDER — SODIUM CHLORIDE, SODIUM LACTATE, POTASSIUM CHLORIDE, CALCIUM CHLORIDE 600; 310; 30; 20 MG/100ML; MG/100ML; MG/100ML; MG/100ML
125 INJECTION, SOLUTION INTRAVENOUS CONTINUOUS
Status: DISCONTINUED | OUTPATIENT
Start: 2020-09-23 | End: 2020-09-23

## 2020-09-23 RX ORDER — SODIUM CHLORIDE, SODIUM LACTATE, POTASSIUM CHLORIDE, CALCIUM CHLORIDE 600; 310; 30; 20 MG/100ML; MG/100ML; MG/100ML; MG/100ML
125 INJECTION, SOLUTION INTRAVENOUS CONTINUOUS
Status: DISCONTINUED | OUTPATIENT
Start: 2020-09-23 | End: 2020-09-23 | Stop reason: HOSPADM

## 2020-09-23 RX ORDER — DEXAMETHASONE SODIUM PHOSPHATE 10 MG/ML
INJECTION, SOLUTION INTRAMUSCULAR; INTRAVENOUS AS NEEDED
Status: DISCONTINUED | OUTPATIENT
Start: 2020-09-23 | End: 2020-09-23

## 2020-09-23 RX ORDER — ONDANSETRON 2 MG/ML
4 INJECTION INTRAMUSCULAR; INTRAVENOUS ONCE AS NEEDED
Status: DISCONTINUED | OUTPATIENT
Start: 2020-09-23 | End: 2020-09-23 | Stop reason: HOSPADM

## 2020-09-23 RX ORDER — GLYCOPYRROLATE 0.2 MG/ML
INJECTION INTRAMUSCULAR; INTRAVENOUS AS NEEDED
Status: DISCONTINUED | OUTPATIENT
Start: 2020-09-23 | End: 2020-09-23

## 2020-09-23 RX ORDER — PROPOFOL 10 MG/ML
INJECTION, EMULSION INTRAVENOUS AS NEEDED
Status: DISCONTINUED | OUTPATIENT
Start: 2020-09-23 | End: 2020-09-23

## 2020-09-23 RX ORDER — CEFAZOLIN SODIUM 2 G/50ML
2000 SOLUTION INTRAVENOUS ONCE
Status: COMPLETED | OUTPATIENT
Start: 2020-09-23 | End: 2020-09-23

## 2020-09-23 RX ORDER — FENTANYL CITRATE/PF 50 MCG/ML
25 SYRINGE (ML) INJECTION
Status: DISCONTINUED | OUTPATIENT
Start: 2020-09-23 | End: 2020-09-23 | Stop reason: HOSPADM

## 2020-09-23 RX ORDER — ONDANSETRON 2 MG/ML
INJECTION INTRAMUSCULAR; INTRAVENOUS AS NEEDED
Status: DISCONTINUED | OUTPATIENT
Start: 2020-09-23 | End: 2020-09-23

## 2020-09-23 RX ORDER — MIDAZOLAM HYDROCHLORIDE 2 MG/2ML
INJECTION, SOLUTION INTRAMUSCULAR; INTRAVENOUS AS NEEDED
Status: DISCONTINUED | OUTPATIENT
Start: 2020-09-23 | End: 2020-09-23

## 2020-09-23 RX ORDER — CHLORHEXIDINE GLUCONATE 4 G/100ML
SOLUTION TOPICAL DAILY PRN
Status: DISCONTINUED | OUTPATIENT
Start: 2020-09-23 | End: 2020-09-23 | Stop reason: HOSPADM

## 2020-09-23 RX ORDER — EPHEDRINE SULFATE 50 MG/ML
INJECTION INTRAVENOUS AS NEEDED
Status: DISCONTINUED | OUTPATIENT
Start: 2020-09-23 | End: 2020-09-23

## 2020-09-23 RX ORDER — HYDROMORPHONE HCL/PF 1 MG/ML
0.25 SYRINGE (ML) INJECTION
Status: DISCONTINUED | OUTPATIENT
Start: 2020-09-23 | End: 2020-09-23 | Stop reason: HOSPADM

## 2020-09-23 RX ORDER — ROCURONIUM BROMIDE 10 MG/ML
INJECTION, SOLUTION INTRAVENOUS AS NEEDED
Status: DISCONTINUED | OUTPATIENT
Start: 2020-09-23 | End: 2020-09-23

## 2020-09-23 RX ORDER — BUPIVACAINE HYDROCHLORIDE AND EPINEPHRINE 2.5; 5 MG/ML; UG/ML
INJECTION, SOLUTION EPIDURAL; INFILTRATION; INTRACAUDAL; PERINEURAL AS NEEDED
Status: DISCONTINUED | OUTPATIENT
Start: 2020-09-23 | End: 2020-09-23 | Stop reason: HOSPADM

## 2020-09-23 RX ORDER — BUPIVACAINE HYDROCHLORIDE 5 MG/ML
INJECTION, SOLUTION PERINEURAL
Status: COMPLETED | OUTPATIENT
Start: 2020-09-23 | End: 2020-09-23

## 2020-09-23 RX ORDER — CEFAZOLIN SODIUM 1 G/3ML
INJECTION, POWDER, FOR SOLUTION INTRAMUSCULAR; INTRAVENOUS AS NEEDED
Status: DISCONTINUED | OUTPATIENT
Start: 2020-09-23 | End: 2020-09-23

## 2020-09-23 RX ORDER — ALBUTEROL SULFATE 2.5 MG/3ML
2.5 SOLUTION RESPIRATORY (INHALATION) ONCE AS NEEDED
Status: DISCONTINUED | OUTPATIENT
Start: 2020-09-23 | End: 2020-09-23 | Stop reason: HOSPADM

## 2020-09-23 RX ORDER — TRAMADOL HYDROCHLORIDE 50 MG/1
50 TABLET ORAL EVERY 6 HOURS PRN
Qty: 40 TABLET | Refills: 0 | Status: SHIPPED | OUTPATIENT
Start: 2020-09-23 | End: 2020-10-03

## 2020-09-23 RX ADMIN — GLYCOPYRROLATE 0.2 MG: 0.2 INJECTION, SOLUTION INTRAMUSCULAR; INTRAVENOUS at 12:02

## 2020-09-23 RX ADMIN — SODIUM CHLORIDE, SODIUM LACTATE, POTASSIUM CHLORIDE, AND CALCIUM CHLORIDE 125 ML/HR: .6; .31; .03; .02 INJECTION, SOLUTION INTRAVENOUS at 10:52

## 2020-09-23 RX ADMIN — PHENYLEPHRINE HYDROCHLORIDE 25 MCG/MIN: 10 INJECTION INTRAVENOUS at 12:13

## 2020-09-23 RX ADMIN — DEXAMETHASONE SODIUM PHOSPHATE 8 MG: 10 INJECTION, SOLUTION INTRAMUSCULAR; INTRAVENOUS at 12:15

## 2020-09-23 RX ADMIN — MIDAZOLAM HYDROCHLORIDE 1 MG: 1 INJECTION, SOLUTION INTRAMUSCULAR; INTRAVENOUS at 11:30

## 2020-09-23 RX ADMIN — EPHEDRINE SULFATE 5 MG: 50 INJECTION, SOLUTION INTRAVENOUS at 12:50

## 2020-09-23 RX ADMIN — FENTANYL CITRATE 50 MCG: 50 INJECTION, SOLUTION INTRAMUSCULAR; INTRAVENOUS at 11:30

## 2020-09-23 RX ADMIN — MIDAZOLAM HYDROCHLORIDE 1 MG: 1 INJECTION, SOLUTION INTRAMUSCULAR; INTRAVENOUS at 12:02

## 2020-09-23 RX ADMIN — FENTANYL CITRATE 50 MCG: 50 INJECTION, SOLUTION INTRAMUSCULAR; INTRAVENOUS at 12:08

## 2020-09-23 RX ADMIN — CEFAZOLIN SODIUM 2000 MG: 2 SOLUTION INTRAVENOUS at 12:02

## 2020-09-23 RX ADMIN — ROCURONIUM BROMIDE 50 MG: 50 INJECTION, SOLUTION INTRAVENOUS at 12:08

## 2020-09-23 RX ADMIN — SUGAMMADEX 200 MG: 100 INJECTION, SOLUTION INTRAVENOUS at 13:34

## 2020-09-23 RX ADMIN — ONDANSETRON HYDROCHLORIDE 4 MG: 2 INJECTION, SOLUTION INTRAVENOUS at 13:25

## 2020-09-23 RX ADMIN — PROPOFOL 200 MG: 10 INJECTION, EMULSION INTRAVENOUS at 12:08

## 2020-09-23 RX ADMIN — LIDOCAINE HYDROCHLORIDE 50 MG: 10 INJECTION, SOLUTION EPIDURAL; INFILTRATION; INTRACAUDAL; PERINEURAL at 12:08

## 2020-09-23 RX ADMIN — BUPIVACAINE HYDROCHLORIDE 10 ML: 5 INJECTION, SOLUTION PERINEURAL at 11:46

## 2020-09-23 RX ADMIN — CEFAZOLIN 1000 MG: 1 INJECTION, POWDER, FOR SOLUTION INTRAVENOUS at 12:02

## 2020-09-23 RX ADMIN — PHENYLEPHRINE HYDROCHLORIDE 200 MCG: 10 INJECTION INTRAVENOUS at 12:13

## 2020-09-23 NOTE — ANESTHESIA PROCEDURE NOTES
Peripheral Block    Patient location during procedure: post-op  Start time: 9/23/2020 11:22 AM  Reason for block: procedure for pain, at surgeon's request and post-op pain management  Staffing  Anesthesiologist: Des Duron MD  Performed: anesthesiologist   Preanesthetic Checklist  Completed: patient identified, site marked, surgical consent, pre-op evaluation, timeout performed, IV checked, risks and benefits discussed and monitors and equipment checked  Peripheral Block  Patient position: sitting  Prep: ChloraPrep  Patient monitoring: heart rate, cardiac monitor, continuous pulse ox and frequent blood pressure checks  Block type: interscalene  Laterality: left  Injection technique: single-shot  Procedures: ultrasound guided, Ultrasound guidance required for the procedure to increase accuracy and safety of medication placement and decrease risk of complications  Ultrasound permanent image savedbupivacaine (MARCAINE) 0 5 % perineural infiltration, 10 mL  Needle  Needle type: Stimuplex   Needle gauge: 22 G  Needle length: 15 cm  Needle localization: ultrasound guidance  Test dose: negative  Assessment  Injection assessment: incremental injection, local visualized surrounding nerve on ultrasound, negative aspiration for heme and no paresthesia on injection  Heart rate change: no  Slow fractionated injection: yes  Post-procedure:  site cleaned  patient tolerated the procedure well with no immediate complications  Additional Notes  5 cc 0 5% ropiv and 20 cc Exparel  Nerve well seen

## 2020-09-23 NOTE — DISCHARGE INSTRUCTIONS
Bupivacaine Liposome (By injection)   Bupivacaine Liposome (rgz-CEP-i-bridges LYE-poh-some)  Relieves pain after surgery  This medicine is a local anesthetic  Brand Name(s): Exparel   There may be other brand names for this medicine  When This Medicine Should Not Be Used: This medicine is not right for everyone  Do not use it if you had an allergic reaction to bupivacaine  How to Use This Medicine:   Injectable  · A nurse or other trained health professional will give you this medicine in a hospital  This medicine is given through a needle injected into the surgical site  Drugs and Foods to Avoid:   Ask your doctor or pharmacist before using any other medicine, including over-the-counter medicines, vitamins, and herbal products  · Tell your doctor if you are also using other numbing medicines, including other kinds of bupivacaine, such as lidocaine  You should not be given any other kind of bupivacaine for at least 4 days  Warnings While Using This Medicine:   · Tell your doctor if you are pregnant or breastfeeding, or if you have kidney disease or liver disease  · This medicine may make you dizzy or drowsy  Do not drive or do anything that could be dangerous until you know how this medicine affects you  · This medicine should cause numbness only to the area where it is injected  It is not meant to cause you to fall asleep or become unconscious  · It may be easier to hurt yourself while your treated body area is still numb  Be careful to avoid injury until you have regained all the feeling and are no longer numb    Possible Side Effects While Using This Medicine:   Call your doctor right away if you notice any of these side effects:  · Allergic reaction: Itching or hives, swelling in your face or hands, swelling or tingling in your mouth or throat, chest tightness, trouble breathing  · Anxiety, depression, restlessness, drowsiness, ringing in your ears, blurred vision  · Chest pain, fast, pounding, slow, or uneven heartbeat, trouble breathing  · Lightheadedness, dizziness, fainting  · Nausea, vomiting, chills, metallic taste in your mouth  · Seizures, shivering, shaking, or tremors  If you notice these less serious side effects, talk with your doctor:   · Headache, back pain  · Trouble sleeping  If you notice other side effects that you think are caused by this medicine, tell your doctor  Call your doctor for medical advice about side effects  You may report side effects to FDA at 8-138-JWO-7717  © 2017 [employer] Information is for End User's use only and may not be sold, redistributed or otherwise used for commercial purposes  The above information is an  only  It is not intended as medical advice for individual conditions or treatments  Talk to your doctor, nurse or pharmacist before following any medical regimen to see if it is safe and effective for you  Cigarette Smoking and Your Health   AMBULATORY CARE:   Risks to your health if you smoke:  Nicotine and other chemicals found in tobacco damage every cell in your body  Even if you are a light smoker, you have an increased risk for cancer, heart disease, and lung disease  If you are pregnant or have diabetes, smoking increases your risk for complications  Benefits to your health if you stop smoking:   · You decrease respiratory symptoms such as coughing, wheezing, and shortness of breath  · You reduce your risk for cancers of the lung, mouth, throat, kidney, bladder, pancreas, stomach, and cervix  If you already have cancer, you increase the benefits of chemotherapy  You also reduce your risk for cancer returning or a second cancer from developing  · You reduce your risk for heart disease, blood clots, heart attack, and stroke  · You reduce your risk for lung infections, and diseases such as pneumonia, asthma, chronic bronchitis, and emphysema  · Your circulation improves   More oxygen can be delivered to your body  If you have diabetes, you lower your risk for complications, such as kidney, artery, and eye diseases  You also lower your risk for nerve damage  Nerve damage can lead to amputations, poor vision, and blindness  · You improve your body's ability to heal and to fight infections  Benefits to the health of others if you stop smoking:  Tobacco is harmful to nonsmokers who breathe in your secondhand smoke  The following are ways the health of others around you may improve when you stop smoking:  · You lower the risks for lung cancer and heart disease in nonsmoking adults  · If you are pregnant, you lower the risk for miscarriage, early delivery, low birth weight, and stillbirth  You also lower your baby's risk for SIDS, obesity, developmental delay, and neurobehavioral problems, such as ADHD  · If you have children, you lower their risk for ear infections, colds, pneumonia, bronchitis, and asthma  For more information and support to stop smoking:   · [employer]  Phone: 6- 936 - 755-6088  Web Address: [employer]  Follow up with your healthcare provider as directed:  Write down your questions so you remember to ask them during your visits  © 2017 [employer]  Information is for End User's use only and may not be sold, redistributed or otherwise used for commercial purposes  All illustrations and images included in CareNotes® are the copyrighted property of [employer]  or [employer]  The above information is an  only  It is not intended as medical advice for individual conditions or treatments  Talk to your doctor, nurse or pharmacist before following any medical regimen to see if it is safe and effective for you  POST-OPERATIVE SHOULDER INSTRUCTIONS II    The principal surgical findings in your shoulder were:    1  Rotator cuff tear    2  Biceps tendonitis          The following corrective procedures were performed:     1   Arthroscopic acromioplasty, open rotator cuff repair    2  Shoulder biceps tenotomy      FOLLOW-UP:    You will need an appointment in:   Please call the office at   today  GENERAL INSTRUCTIONS:     · Apply an ice pack to your shoulder for the next 12-24 hours  Bruising may appear on your arm or chest in a few days  · If you have an upset stomach, take only cool, clear liquids such as Gatorade, Jello, or Ginger ale  If nausea persists more then 24 hours, notify the office  · Low-grade temperature is not uncommon after surgery  However, of your temperature exceeds 101 degrees, please notify the office  · Any prescriptions you received before or after surgery should be filled immediately and taken according to directions on the label  Taking medication with food or with a glass of milk will avoid stomach upset  EXERCISES:    · Leave your arm in the sling  After two days may remove the sling for showers and dressing  When the sling is off, keep your arm resting gently at your side and do NOT try to move your arm away from your body  · If you have an abduction pillow, maintain the arm in this at all times aside from bathing and dressing  We will tell you when it can be removed safely  · Exercise your hand and fingers every 30 minutes by alternately making a fist, then extending your fingers fully several times  For the first 48 hours inhale deeply and hold your breath for 3 seconds; exhale completely  Repeat 10 times, 4 time daily  If you smoke, avoid cigarettes for 48 hours  BANDAGES:     · If you have an abduction pillow, maintain the arm in this at all times aside from bathing and dressing  Keep your arm loosely hanging by your side  Do not try to lift your arm away from your body  · Your bandage may show blood stains within 1-12 hours  However, if your bandage becomes saturated, notify my office immediately     · Remove and discard your bandages and yellow gauze after 48 hours and cover with Band-aids, or apply a dry sterile dressing to the wound, or dressing change post-operatively as instructed by your orthopedic surgeon  · May shower in 72 hours  Do nor soak the incisions  · ABDs are used under the affected arm (for female patients, under the breasts also), to absorb moisture and prevent skin breakdown  They should be changed daily  · You may lean forward gently, letting the arm hang, to allow for washing of the axilla  To wash, remove the sling  Slowly straighten the affected arm while keeping the hand close to the body  If you are standing, arm is straightened in the same manner  · You may then bend at the waist toward the affected side, allowing the arm to hang at a 30 degree angle from the body to facilitate washing  Keeping the hand close to the body, return affected arm to original position and reapply sling  CLOTHING:    · Sling/abduction sling may be worn over clothes  · Female patients may start wearing a bra when they feel comfortable  WORK:     · Your comfort should be your guide for returning to work  Avoid overhead positions until released, except for your exercises  · Limit lifting, pushing, or pulling according to comfort  IMPORTANT:  Report any complications to my office immediately  This includes excessive bleeding, wound breakdown, or signs of infection, excessive pain or fever over 101 degrees  Also, call if you notice the extremity becoming cold, blue, or numb       Eat a balanced diet and get ample rest

## 2020-09-23 NOTE — OP NOTE
Orthopedics REPAIR ROTATOR CUFF  ARTHROSCOPIC with biceps tenotomy  Op Note      Pre-op Diagnosis:   Traumatic tear of left rotator cuff    Post-op Diagnosis:  Left massive rotator cuff tear with biceps tendinitis    Procedure:  Left rotator cuff tear repair with biceps tenotomy    Surgeon:  Surgeon(s):  Nadeen Gowers, MD Jurline Punch, PA-C     I was present for the entire procedure, A qualified resident physician was not available and A physician assistant was required during the procedure for retraction tissue handling,dissection and suturing    Anesthesia:  Regional with general    Estimated Blood Loss:  Minimal    Material sent to lab:  None      Complications:  None    Findings:  Examination under anesthesia revealed the shoulder was  stable  Arthroscopic examination revealed Nearly 50% fraying of the biceps tendon with a full-thickness rotator cuff tear including the supraspinatus and infraspinatus with minimal retraction  Approximately at the apex of the tear there was a split proximally  Indications:  63 y o  y/o female with pain in their left shoulder with exam and MRI consistent with a supraspinatus tear  The patient was unresponsive to nonoperative management  Treatment options were discussed, and the patient wished to proceed with surgical intervention  Risks and benefits of surgery were discussed which included but were not limited to infection, neurovascular damage, incomplete resolution of symptoms, wound healing problems, stiffness, need for further surgery, DVT, PE, and death  Informed consent was obtained  Procedure: The patient was met preoperatively and the left shoulder was identified and signed  A regional block was placed  The patient was taken back to the operating room where a General endotracheal was performed  Patient was positioned in the  beach chair position  Bony prominences were well-padded  The shoulder was sterilely prepped and draped in the usual fashion   A timeout was held identifying the patient, side, site, antibiotics, and allergies  The patient received Ancef preoperatively  A posterior portal was established after marking out the bony landmarks  I insufflated the glenohumeral joint with 60 mL of normal saline, made an incision on the skin after placing local anesthesia, and placed the trocar into the joint  An anterior portal was then established in the inside out technique using a switching stick just proximal to the supraspinatus  Just prior to skin incision local anesthesia was used, and incision was made, the area was dilated, and a cannula placed  A diagnostic shoulder arthroscopy was performed with the above-stated results  Given the amount of fraying of the biceps tendon I elected to for from a tenotomy  There was minimal inflammation of the glenohumeral joint  The instruments were then placed in the subacromial space  A lateral portal was established with an 18 gauge needle in a trocar  Yolanda Chastity was placed through the lateral portal and a bursectomy was performed to allow for good visualization  The noted was a large rotator cuff tear the supraspinatus and infraspinatus with minimal retraction  The apex of the tear there was a split proximally  The cuff was able to easily be brought down to the footprint  I did a [id number] repair initially the using the 1st pass  This was tensioned down and provided a stable, circumferential area to repair  The footprint was debrided  Three horizontal mattress sutures were placed in the cuff spread out  I then placed three opus speed screw anchors starting anteriorly and working my way posteriorly  He has a lot of to approximate the cuff back down to the footprint with no significant tension on the repair  There were no dog ears  Once the procedure was completed all extraneous fluid was drained  Portal sites were closed with 4-0 nylon  Sterile dressing was placed   Patient was placed in a sling postoperatively  Disposition:  PACU    Plan:  Patient will follow-up in 7-10 days for wound check and suture removal  The patient will be in a sling for four weeks and may wean as tolerated  Passive range of motion may begin immediately  Active assisted range of motion may begin at four weeks  Active range of motion may begin at 10 weeks postoperatively       @[facility]@     Date: 9/23/2020  Time: 1:39 PM

## 2020-10-06 ENCOUNTER — OFFICE VISIT (OUTPATIENT)
Dept: OBGYN CLINIC | Facility: CLINIC | Age: 55
End: 2020-10-06

## 2020-10-06 VITALS — TEMPERATURE: 98.4 F | BODY MASS INDEX: 45.83 KG/M2 | WEIGHT: 292 LBS | HEIGHT: 67 IN

## 2020-10-06 DIAGNOSIS — S46.012D TRAUMATIC COMPLETE TEAR OF LEFT ROTATOR CUFF, SUBSEQUENT ENCOUNTER: Primary | ICD-10-CM

## 2020-10-06 DIAGNOSIS — Z98.890 S/P MUSCULOSKELETAL SYSTEM SURGERY: ICD-10-CM

## 2020-10-06 PROCEDURE — 99024 POSTOP FOLLOW-UP VISIT: CPT | Performed by: ORTHOPAEDIC SURGERY

## 2020-10-08 ENCOUNTER — EVALUATION (OUTPATIENT)
Dept: PHYSICAL THERAPY | Facility: CLINIC | Age: 55
End: 2020-10-08
Payer: OTHER MISCELLANEOUS

## 2020-10-08 DIAGNOSIS — S46.012D TRAUMATIC TEAR OF LEFT ROTATOR CUFF, SUBSEQUENT ENCOUNTER: Primary | ICD-10-CM

## 2020-10-08 DIAGNOSIS — S46.012D TRAUMATIC COMPLETE TEAR OF LEFT ROTATOR CUFF, SUBSEQUENT ENCOUNTER: ICD-10-CM

## 2020-10-08 DIAGNOSIS — Z98.890 S/P MUSCULOSKELETAL SYSTEM SURGERY: ICD-10-CM

## 2020-10-08 PROCEDURE — 97110 THERAPEUTIC EXERCISES: CPT

## 2020-10-08 PROCEDURE — 97140 MANUAL THERAPY 1/> REGIONS: CPT

## 2020-10-08 PROCEDURE — 97162 PT EVAL MOD COMPLEX 30 MIN: CPT

## 2020-10-14 ENCOUNTER — OFFICE VISIT (OUTPATIENT)
Dept: PHYSICAL THERAPY | Facility: CLINIC | Age: 55
End: 2020-10-14
Payer: OTHER MISCELLANEOUS

## 2020-10-14 DIAGNOSIS — Z98.890 S/P MUSCULOSKELETAL SYSTEM SURGERY: ICD-10-CM

## 2020-10-14 DIAGNOSIS — S46.012D TRAUMATIC COMPLETE TEAR OF LEFT ROTATOR CUFF, SUBSEQUENT ENCOUNTER: ICD-10-CM

## 2020-10-14 DIAGNOSIS — S46.012D TRAUMATIC TEAR OF LEFT ROTATOR CUFF, SUBSEQUENT ENCOUNTER: Primary | ICD-10-CM

## 2020-10-14 PROCEDURE — 97140 MANUAL THERAPY 1/> REGIONS: CPT

## 2020-10-14 PROCEDURE — 97110 THERAPEUTIC EXERCISES: CPT

## 2020-10-16 ENCOUNTER — OFFICE VISIT (OUTPATIENT)
Dept: PHYSICAL THERAPY | Facility: CLINIC | Age: 55
End: 2020-10-16
Payer: OTHER MISCELLANEOUS

## 2020-10-16 DIAGNOSIS — Z98.890 S/P MUSCULOSKELETAL SYSTEM SURGERY: ICD-10-CM

## 2020-10-16 DIAGNOSIS — S46.012D TRAUMATIC TEAR OF LEFT ROTATOR CUFF, SUBSEQUENT ENCOUNTER: Primary | ICD-10-CM

## 2020-10-16 DIAGNOSIS — S46.012D TRAUMATIC COMPLETE TEAR OF LEFT ROTATOR CUFF, SUBSEQUENT ENCOUNTER: ICD-10-CM

## 2020-10-16 PROCEDURE — 97140 MANUAL THERAPY 1/> REGIONS: CPT

## 2020-10-16 PROCEDURE — 97110 THERAPEUTIC EXERCISES: CPT

## 2020-10-20 ENCOUNTER — OFFICE VISIT (OUTPATIENT)
Dept: PHYSICAL THERAPY | Facility: CLINIC | Age: 55
End: 2020-10-20
Payer: OTHER MISCELLANEOUS

## 2020-10-20 DIAGNOSIS — S46.012D TRAUMATIC TEAR OF LEFT ROTATOR CUFF, SUBSEQUENT ENCOUNTER: Primary | ICD-10-CM

## 2020-10-20 DIAGNOSIS — Z98.890 S/P MUSCULOSKELETAL SYSTEM SURGERY: ICD-10-CM

## 2020-10-20 DIAGNOSIS — S46.012D TRAUMATIC COMPLETE TEAR OF LEFT ROTATOR CUFF, SUBSEQUENT ENCOUNTER: ICD-10-CM

## 2020-10-20 PROCEDURE — 97140 MANUAL THERAPY 1/> REGIONS: CPT

## 2020-10-20 PROCEDURE — 97110 THERAPEUTIC EXERCISES: CPT

## 2020-10-22 ENCOUNTER — OFFICE VISIT (OUTPATIENT)
Dept: PHYSICAL THERAPY | Facility: CLINIC | Age: 55
End: 2020-10-22
Payer: OTHER MISCELLANEOUS

## 2020-10-22 DIAGNOSIS — Z98.890 S/P MUSCULOSKELETAL SYSTEM SURGERY: ICD-10-CM

## 2020-10-22 DIAGNOSIS — S46.012D TRAUMATIC COMPLETE TEAR OF LEFT ROTATOR CUFF, SUBSEQUENT ENCOUNTER: ICD-10-CM

## 2020-10-22 DIAGNOSIS — S46.012D TRAUMATIC TEAR OF LEFT ROTATOR CUFF, SUBSEQUENT ENCOUNTER: Primary | ICD-10-CM

## 2020-10-22 PROCEDURE — 97110 THERAPEUTIC EXERCISES: CPT

## 2020-10-22 PROCEDURE — 97140 MANUAL THERAPY 1/> REGIONS: CPT

## 2020-10-27 ENCOUNTER — APPOINTMENT (OUTPATIENT)
Dept: PHYSICAL THERAPY | Facility: CLINIC | Age: 55
End: 2020-10-27
Payer: OTHER MISCELLANEOUS

## 2020-10-29 ENCOUNTER — OFFICE VISIT (OUTPATIENT)
Dept: INTERNAL MEDICINE CLINIC | Facility: CLINIC | Age: 55
End: 2020-10-29
Payer: COMMERCIAL

## 2020-10-29 VITALS
OXYGEN SATURATION: 98 % | HEART RATE: 92 BPM | BODY MASS INDEX: 45.99 KG/M2 | SYSTOLIC BLOOD PRESSURE: 130 MMHG | TEMPERATURE: 96.6 F | WEIGHT: 293 LBS | DIASTOLIC BLOOD PRESSURE: 82 MMHG | HEIGHT: 67 IN

## 2020-10-29 DIAGNOSIS — Z13.6 SCREENING FOR CARDIOVASCULAR CONDITION: ICD-10-CM

## 2020-10-29 DIAGNOSIS — F43.21 GRIEVING: ICD-10-CM

## 2020-10-29 DIAGNOSIS — E66.01 MORBID OBESITY WITH BMI OF 45.0-49.9, ADULT (HCC): ICD-10-CM

## 2020-10-29 DIAGNOSIS — F41.9 ANXIETY: Primary | ICD-10-CM

## 2020-10-29 DIAGNOSIS — S46.012D TRAUMATIC COMPLETE TEAR OF LEFT ROTATOR CUFF, SUBSEQUENT ENCOUNTER: ICD-10-CM

## 2020-10-29 DIAGNOSIS — M15.9 PRIMARY OSTEOARTHRITIS INVOLVING MULTIPLE JOINTS: ICD-10-CM

## 2020-10-29 DIAGNOSIS — Z12.11 SCREENING FOR COLON CANCER: ICD-10-CM

## 2020-10-29 DIAGNOSIS — Z13.1 SCREENING FOR DIABETES MELLITUS: ICD-10-CM

## 2020-10-29 PROBLEM — M25.572 LEFT ANKLE PAIN: Status: ACTIVE | Noted: 2020-10-29

## 2020-10-29 PROCEDURE — 3725F SCREEN DEPRESSION PERFORMED: CPT | Performed by: FAMILY MEDICINE

## 2020-10-29 PROCEDURE — 99214 OFFICE O/P EST MOD 30 MIN: CPT | Performed by: FAMILY MEDICINE

## 2020-10-29 RX ORDER — DICLOFENAC POTASSIUM 50 MG/1
50 TABLET, FILM COATED ORAL 2 TIMES DAILY PRN
Qty: 60 TABLET | Refills: 1 | Status: SHIPPED | OUTPATIENT
Start: 2020-10-29 | End: 2021-03-29 | Stop reason: SDUPTHER

## 2020-10-29 RX ORDER — LORAZEPAM 0.5 MG/1
0.5 TABLET ORAL 2 TIMES DAILY PRN
Qty: 60 TABLET | Refills: 1 | Status: SHIPPED | OUTPATIENT
Start: 2020-10-29 | End: 2022-03-23

## 2020-10-30 ENCOUNTER — OFFICE VISIT (OUTPATIENT)
Dept: PHYSICAL THERAPY | Facility: CLINIC | Age: 55
End: 2020-10-30
Payer: OTHER MISCELLANEOUS

## 2020-10-30 DIAGNOSIS — S46.012D TRAUMATIC TEAR OF LEFT ROTATOR CUFF, SUBSEQUENT ENCOUNTER: Primary | ICD-10-CM

## 2020-10-30 PROCEDURE — 97110 THERAPEUTIC EXERCISES: CPT

## 2020-10-30 PROCEDURE — 97140 MANUAL THERAPY 1/> REGIONS: CPT

## 2020-11-02 ENCOUNTER — OFFICE VISIT (OUTPATIENT)
Dept: PHYSICAL THERAPY | Facility: CLINIC | Age: 55
End: 2020-11-02
Payer: OTHER MISCELLANEOUS

## 2020-11-02 DIAGNOSIS — S46.012D TRAUMATIC TEAR OF LEFT ROTATOR CUFF, SUBSEQUENT ENCOUNTER: Primary | ICD-10-CM

## 2020-11-02 DIAGNOSIS — S46.012D TRAUMATIC COMPLETE TEAR OF LEFT ROTATOR CUFF, SUBSEQUENT ENCOUNTER: ICD-10-CM

## 2020-11-02 DIAGNOSIS — Z98.890 S/P MUSCULOSKELETAL SYSTEM SURGERY: ICD-10-CM

## 2020-11-02 PROCEDURE — 97110 THERAPEUTIC EXERCISES: CPT

## 2020-11-02 PROCEDURE — 97140 MANUAL THERAPY 1/> REGIONS: CPT

## 2020-11-04 ENCOUNTER — OFFICE VISIT (OUTPATIENT)
Dept: PHYSICAL THERAPY | Facility: CLINIC | Age: 55
End: 2020-11-04
Payer: OTHER MISCELLANEOUS

## 2020-11-04 DIAGNOSIS — S46.012D TRAUMATIC TEAR OF LEFT ROTATOR CUFF, SUBSEQUENT ENCOUNTER: Primary | ICD-10-CM

## 2020-11-04 DIAGNOSIS — S46.012D TRAUMATIC COMPLETE TEAR OF LEFT ROTATOR CUFF, SUBSEQUENT ENCOUNTER: ICD-10-CM

## 2020-11-04 DIAGNOSIS — Z98.890 S/P MUSCULOSKELETAL SYSTEM SURGERY: ICD-10-CM

## 2020-11-04 PROCEDURE — 97140 MANUAL THERAPY 1/> REGIONS: CPT | Performed by: PHYSICAL THERAPIST

## 2020-11-04 PROCEDURE — 97110 THERAPEUTIC EXERCISES: CPT | Performed by: PHYSICAL THERAPIST

## 2020-11-05 ENCOUNTER — APPOINTMENT (OUTPATIENT)
Dept: PHYSICAL THERAPY | Facility: CLINIC | Age: 55
End: 2020-11-05
Payer: OTHER MISCELLANEOUS

## 2020-11-09 ENCOUNTER — OFFICE VISIT (OUTPATIENT)
Dept: PHYSICAL THERAPY | Facility: CLINIC | Age: 55
End: 2020-11-09
Payer: OTHER MISCELLANEOUS

## 2020-11-09 DIAGNOSIS — S46.012D TRAUMATIC TEAR OF LEFT ROTATOR CUFF, SUBSEQUENT ENCOUNTER: Primary | ICD-10-CM

## 2020-11-09 PROCEDURE — 97140 MANUAL THERAPY 1/> REGIONS: CPT

## 2020-11-09 PROCEDURE — 97110 THERAPEUTIC EXERCISES: CPT

## 2020-11-12 ENCOUNTER — OFFICE VISIT (OUTPATIENT)
Dept: PHYSICAL THERAPY | Facility: CLINIC | Age: 55
End: 2020-11-12
Payer: OTHER MISCELLANEOUS

## 2020-11-12 DIAGNOSIS — S46.012D TRAUMATIC TEAR OF LEFT ROTATOR CUFF, SUBSEQUENT ENCOUNTER: Primary | ICD-10-CM

## 2020-11-12 PROCEDURE — 97110 THERAPEUTIC EXERCISES: CPT

## 2020-11-12 PROCEDURE — 97140 MANUAL THERAPY 1/> REGIONS: CPT

## 2020-11-16 ENCOUNTER — APPOINTMENT (OUTPATIENT)
Dept: PHYSICAL THERAPY | Facility: CLINIC | Age: 55
End: 2020-11-16
Payer: OTHER MISCELLANEOUS

## 2020-11-17 ENCOUNTER — OFFICE VISIT (OUTPATIENT)
Dept: PHYSICAL THERAPY | Facility: CLINIC | Age: 55
End: 2020-11-17
Payer: OTHER MISCELLANEOUS

## 2020-11-17 ENCOUNTER — OFFICE VISIT (OUTPATIENT)
Dept: OBGYN CLINIC | Facility: CLINIC | Age: 55
End: 2020-11-17

## 2020-11-17 VITALS
DIASTOLIC BLOOD PRESSURE: 80 MMHG | HEART RATE: 89 BPM | HEIGHT: 67 IN | SYSTOLIC BLOOD PRESSURE: 130 MMHG | WEIGHT: 293 LBS | BODY MASS INDEX: 45.99 KG/M2 | TEMPERATURE: 98 F

## 2020-11-17 DIAGNOSIS — S46.012D TRAUMATIC COMPLETE TEAR OF LEFT ROTATOR CUFF, SUBSEQUENT ENCOUNTER: ICD-10-CM

## 2020-11-17 DIAGNOSIS — Z98.890 S/P MUSCULOSKELETAL SYSTEM SURGERY: ICD-10-CM

## 2020-11-17 DIAGNOSIS — Z98.890 S/P MUSCULOSKELETAL SYSTEM SURGERY: Primary | ICD-10-CM

## 2020-11-17 DIAGNOSIS — S46.012D TRAUMATIC TEAR OF LEFT ROTATOR CUFF, SUBSEQUENT ENCOUNTER: Primary | ICD-10-CM

## 2020-11-17 PROCEDURE — 3008F BODY MASS INDEX DOCD: CPT | Performed by: FAMILY MEDICINE

## 2020-11-17 PROCEDURE — 97140 MANUAL THERAPY 1/> REGIONS: CPT

## 2020-11-17 PROCEDURE — 97110 THERAPEUTIC EXERCISES: CPT

## 2020-11-17 PROCEDURE — 99024 POSTOP FOLLOW-UP VISIT: CPT | Performed by: ORTHOPAEDIC SURGERY

## 2020-11-19 ENCOUNTER — OFFICE VISIT (OUTPATIENT)
Dept: PHYSICAL THERAPY | Facility: CLINIC | Age: 55
End: 2020-11-19
Payer: OTHER MISCELLANEOUS

## 2020-11-19 DIAGNOSIS — S46.012D TRAUMATIC TEAR OF LEFT ROTATOR CUFF, SUBSEQUENT ENCOUNTER: Primary | ICD-10-CM

## 2020-11-19 PROCEDURE — 97110 THERAPEUTIC EXERCISES: CPT

## 2020-11-19 PROCEDURE — 97140 MANUAL THERAPY 1/> REGIONS: CPT

## 2020-11-24 ENCOUNTER — OFFICE VISIT (OUTPATIENT)
Dept: PHYSICAL THERAPY | Facility: CLINIC | Age: 55
End: 2020-11-24
Payer: OTHER MISCELLANEOUS

## 2020-11-24 DIAGNOSIS — Z98.890 S/P MUSCULOSKELETAL SYSTEM SURGERY: ICD-10-CM

## 2020-11-24 DIAGNOSIS — S46.012D TRAUMATIC COMPLETE TEAR OF LEFT ROTATOR CUFF, SUBSEQUENT ENCOUNTER: ICD-10-CM

## 2020-11-24 DIAGNOSIS — S46.012D TRAUMATIC TEAR OF LEFT ROTATOR CUFF, SUBSEQUENT ENCOUNTER: Primary | ICD-10-CM

## 2020-11-24 PROCEDURE — 97110 THERAPEUTIC EXERCISES: CPT

## 2020-11-24 PROCEDURE — 97140 MANUAL THERAPY 1/> REGIONS: CPT

## 2020-11-27 ENCOUNTER — OFFICE VISIT (OUTPATIENT)
Dept: PHYSICAL THERAPY | Facility: CLINIC | Age: 55
End: 2020-11-27
Payer: OTHER MISCELLANEOUS

## 2020-11-27 DIAGNOSIS — S46.012D TRAUMATIC TEAR OF LEFT ROTATOR CUFF, SUBSEQUENT ENCOUNTER: Primary | ICD-10-CM

## 2020-11-27 DIAGNOSIS — Z98.890 S/P MUSCULOSKELETAL SYSTEM SURGERY: ICD-10-CM

## 2020-11-27 DIAGNOSIS — S46.012D TRAUMATIC COMPLETE TEAR OF LEFT ROTATOR CUFF, SUBSEQUENT ENCOUNTER: ICD-10-CM

## 2020-11-27 PROCEDURE — 97140 MANUAL THERAPY 1/> REGIONS: CPT

## 2020-11-27 PROCEDURE — 97110 THERAPEUTIC EXERCISES: CPT

## 2020-12-01 ENCOUNTER — TELEPHONE (OUTPATIENT)
Dept: OBGYN CLINIC | Facility: CLINIC | Age: 55
End: 2020-12-01

## 2020-12-02 ENCOUNTER — EVALUATION (OUTPATIENT)
Dept: PHYSICAL THERAPY | Facility: CLINIC | Age: 55
End: 2020-12-02
Payer: OTHER MISCELLANEOUS

## 2020-12-02 DIAGNOSIS — S46.012D TRAUMATIC COMPLETE TEAR OF LEFT ROTATOR CUFF, SUBSEQUENT ENCOUNTER: ICD-10-CM

## 2020-12-02 DIAGNOSIS — Z98.890 S/P MUSCULOSKELETAL SYSTEM SURGERY: ICD-10-CM

## 2020-12-02 DIAGNOSIS — S46.012D TRAUMATIC TEAR OF LEFT ROTATOR CUFF, SUBSEQUENT ENCOUNTER: Primary | ICD-10-CM

## 2020-12-02 PROCEDURE — 97110 THERAPEUTIC EXERCISES: CPT

## 2020-12-02 PROCEDURE — 97140 MANUAL THERAPY 1/> REGIONS: CPT

## 2020-12-04 ENCOUNTER — APPOINTMENT (OUTPATIENT)
Dept: PHYSICAL THERAPY | Facility: CLINIC | Age: 55
End: 2020-12-04
Payer: OTHER MISCELLANEOUS

## 2020-12-07 ENCOUNTER — VBI (OUTPATIENT)
Dept: ADMINISTRATIVE | Facility: OTHER | Age: 55
End: 2020-12-07

## 2020-12-07 ENCOUNTER — OFFICE VISIT (OUTPATIENT)
Dept: PHYSICAL THERAPY | Facility: CLINIC | Age: 55
End: 2020-12-07
Payer: OTHER MISCELLANEOUS

## 2020-12-07 DIAGNOSIS — S46.012D TRAUMATIC TEAR OF LEFT ROTATOR CUFF, SUBSEQUENT ENCOUNTER: Primary | ICD-10-CM

## 2020-12-07 DIAGNOSIS — S46.012D TRAUMATIC COMPLETE TEAR OF LEFT ROTATOR CUFF, SUBSEQUENT ENCOUNTER: ICD-10-CM

## 2020-12-07 DIAGNOSIS — Z98.890 S/P MUSCULOSKELETAL SYSTEM SURGERY: ICD-10-CM

## 2020-12-07 PROCEDURE — 97110 THERAPEUTIC EXERCISES: CPT

## 2020-12-07 PROCEDURE — 97140 MANUAL THERAPY 1/> REGIONS: CPT

## 2020-12-11 ENCOUNTER — OFFICE VISIT (OUTPATIENT)
Dept: PHYSICAL THERAPY | Facility: CLINIC | Age: 55
End: 2020-12-11
Payer: OTHER MISCELLANEOUS

## 2020-12-11 DIAGNOSIS — S46.012D TRAUMATIC TEAR OF LEFT ROTATOR CUFF, SUBSEQUENT ENCOUNTER: Primary | ICD-10-CM

## 2020-12-11 DIAGNOSIS — S46.012D TRAUMATIC COMPLETE TEAR OF LEFT ROTATOR CUFF, SUBSEQUENT ENCOUNTER: ICD-10-CM

## 2020-12-11 DIAGNOSIS — Z98.890 S/P MUSCULOSKELETAL SYSTEM SURGERY: ICD-10-CM

## 2020-12-11 PROCEDURE — 97110 THERAPEUTIC EXERCISES: CPT

## 2020-12-11 PROCEDURE — 97112 NEUROMUSCULAR REEDUCATION: CPT

## 2020-12-11 PROCEDURE — 97140 MANUAL THERAPY 1/> REGIONS: CPT

## 2020-12-14 ENCOUNTER — OFFICE VISIT (OUTPATIENT)
Dept: PHYSICAL THERAPY | Facility: CLINIC | Age: 55
End: 2020-12-14
Payer: OTHER MISCELLANEOUS

## 2020-12-14 DIAGNOSIS — Z98.890 S/P MUSCULOSKELETAL SYSTEM SURGERY: ICD-10-CM

## 2020-12-14 DIAGNOSIS — S46.012D TRAUMATIC COMPLETE TEAR OF LEFT ROTATOR CUFF, SUBSEQUENT ENCOUNTER: ICD-10-CM

## 2020-12-14 DIAGNOSIS — S46.012D TRAUMATIC TEAR OF LEFT ROTATOR CUFF, SUBSEQUENT ENCOUNTER: Primary | ICD-10-CM

## 2020-12-14 PROCEDURE — 97140 MANUAL THERAPY 1/> REGIONS: CPT | Performed by: PHYSICAL THERAPIST

## 2020-12-14 PROCEDURE — 97110 THERAPEUTIC EXERCISES: CPT | Performed by: PHYSICAL THERAPIST

## 2020-12-18 ENCOUNTER — OFFICE VISIT (OUTPATIENT)
Dept: PHYSICAL THERAPY | Facility: CLINIC | Age: 55
End: 2020-12-18
Payer: OTHER MISCELLANEOUS

## 2020-12-18 DIAGNOSIS — S46.012D TRAUMATIC TEAR OF LEFT ROTATOR CUFF, SUBSEQUENT ENCOUNTER: Primary | ICD-10-CM

## 2020-12-18 DIAGNOSIS — S46.012D TRAUMATIC COMPLETE TEAR OF LEFT ROTATOR CUFF, SUBSEQUENT ENCOUNTER: ICD-10-CM

## 2020-12-18 DIAGNOSIS — Z98.890 S/P MUSCULOSKELETAL SYSTEM SURGERY: ICD-10-CM

## 2020-12-18 PROCEDURE — 97032 APPL MODALITY 1+ESTIM EA 15: CPT | Performed by: PHYSICAL THERAPIST

## 2020-12-18 PROCEDURE — 97110 THERAPEUTIC EXERCISES: CPT | Performed by: PHYSICAL THERAPIST

## 2020-12-18 PROCEDURE — 97140 MANUAL THERAPY 1/> REGIONS: CPT | Performed by: PHYSICAL THERAPIST

## 2020-12-21 ENCOUNTER — OFFICE VISIT (OUTPATIENT)
Dept: PHYSICAL THERAPY | Facility: CLINIC | Age: 55
End: 2020-12-21
Payer: OTHER MISCELLANEOUS

## 2020-12-21 DIAGNOSIS — S46.012D TRAUMATIC COMPLETE TEAR OF LEFT ROTATOR CUFF, SUBSEQUENT ENCOUNTER: ICD-10-CM

## 2020-12-21 DIAGNOSIS — Z98.890 S/P MUSCULOSKELETAL SYSTEM SURGERY: ICD-10-CM

## 2020-12-21 DIAGNOSIS — S46.012D TRAUMATIC TEAR OF LEFT ROTATOR CUFF, SUBSEQUENT ENCOUNTER: Primary | ICD-10-CM

## 2020-12-21 PROCEDURE — 97110 THERAPEUTIC EXERCISES: CPT

## 2020-12-21 PROCEDURE — 97140 MANUAL THERAPY 1/> REGIONS: CPT

## 2020-12-21 PROCEDURE — 97014 ELECTRIC STIMULATION THERAPY: CPT

## 2020-12-30 ENCOUNTER — OFFICE VISIT (OUTPATIENT)
Dept: PHYSICAL THERAPY | Facility: CLINIC | Age: 55
End: 2020-12-30
Payer: OTHER MISCELLANEOUS

## 2020-12-30 DIAGNOSIS — S46.012D TRAUMATIC COMPLETE TEAR OF LEFT ROTATOR CUFF, SUBSEQUENT ENCOUNTER: ICD-10-CM

## 2020-12-30 DIAGNOSIS — Z98.890 S/P MUSCULOSKELETAL SYSTEM SURGERY: ICD-10-CM

## 2020-12-30 DIAGNOSIS — S46.012D TRAUMATIC TEAR OF LEFT ROTATOR CUFF, SUBSEQUENT ENCOUNTER: Primary | ICD-10-CM

## 2020-12-30 PROCEDURE — 97032 APPL MODALITY 1+ESTIM EA 15: CPT

## 2020-12-30 PROCEDURE — 97110 THERAPEUTIC EXERCISES: CPT

## 2021-01-04 ENCOUNTER — OFFICE VISIT (OUTPATIENT)
Dept: PHYSICAL THERAPY | Facility: CLINIC | Age: 56
End: 2021-01-04
Payer: OTHER MISCELLANEOUS

## 2021-01-04 DIAGNOSIS — S46.012D TRAUMATIC TEAR OF LEFT ROTATOR CUFF, SUBSEQUENT ENCOUNTER: Primary | ICD-10-CM

## 2021-01-04 PROCEDURE — 97110 THERAPEUTIC EXERCISES: CPT | Performed by: PHYSICAL THERAPIST

## 2021-01-04 PROCEDURE — 97140 MANUAL THERAPY 1/> REGIONS: CPT | Performed by: PHYSICAL THERAPIST

## 2021-01-04 NOTE — PROGRESS NOTES
Daily Note     Today's date: 2021  Patient name: Valeriano Gomez  : 1965  MRN: [id number]  Referring provider: Duane Cho MD  Dx:   Encounter Diagnosis     ICD-10-CM    1  Traumatic tear of left rotator cuff, subsequent encounter  S46 012D                   Subjective: Pt notes that she worked over the holiday weekend so the shoulder is a little sore  Objective: See treatment diary below      Assessment: Pt with PROM approaching WNL with mild tightness at end ranges in all planes  Greater limitations with strength as mobility is limited with prone activities  Continue with progression as tolerable  Plan: Continue per plan of care  Precautions: PROM x4 weeks, add AAROM at week 4, AROM week 10, must wear sling x 4 weeks    Surgery date : 20  Re-eval Date: per ins guidelines    Date       Visit Count 21 22      FOTO        Pain In 2       Pain Out better         Manuals        PROM L shld resume 10 minutes                               Neuro Re-Ed                                                                Ther Ex        AROM L wrist        AROM L elbow Palm up/down thumb up 4# 30 ea                Pendulum ex        Pulleys  Flex/scap  Not avail  3 min ea       Cane AAROM Supine  Flex  3x30"       tableslides Wall slides   X 20 ea dir   Flex/scap       isometrics        shrugs  rolls        Doorway stretch        scap retraction        UBE 70 rpm  8' alt 70 prm   10' alt       Active flex/scap to 90*        S/L abd /ER  20 x ea  resume X 20 ea       Elbow flares        Prone flex, ext ,HA,rows 20 ea  review X 20 ea dir       MTPs/LTPs Green  20x ea  @ home Green x 30 ea       IR/ER grn  20ea  @ home Green x 30                               Ther Activity                        Gait Training                        Modalities        HP to L shld CP with ESTIM 15 mins, IFC to left GHJ seated with UE supported Deferred to home application       CP

## 2021-01-08 ENCOUNTER — OFFICE VISIT (OUTPATIENT)
Dept: PHYSICAL THERAPY | Facility: CLINIC | Age: 56
End: 2021-01-08
Payer: OTHER MISCELLANEOUS

## 2021-01-08 DIAGNOSIS — S46.012D TRAUMATIC TEAR OF LEFT ROTATOR CUFF, SUBSEQUENT ENCOUNTER: Primary | ICD-10-CM

## 2021-01-08 PROCEDURE — 97140 MANUAL THERAPY 1/> REGIONS: CPT

## 2021-01-08 PROCEDURE — 97110 THERAPEUTIC EXERCISES: CPT

## 2021-01-08 NOTE — PROGRESS NOTES
Daily Note     Today's date: 2021  Patient name: Bran Khan  : 1965  MRN: [id number]  Referring provider: Anam Mayen MD  Dx:   Encounter Diagnosis     ICD-10-CM    1  Traumatic tear of left rotator cuff, subsequent encounter  S46 012D                   Subjective:  "Just tight", is pt's response to status of L shoulder region  Objective: See treatment diary below      Assessment: Tolerated treatment Well overall with performance of ther exer  Noted improved ROM, flexibility, and functional mobility  Plan:  Con't services 2x/week as per POC/Goals,  and as per protocol guidelines  Precautions: PROM x4 weeks, add AAROM at week 4, AROM week 10, must wear sling x 4 weeks    Surgery date : 20  Re-eval Date: per ins guidelines    Date  1 8 21     Visit Count 21 22 23     FOTO        Pain In 2  0/10  "Just Tight"       Pain Out better   less tight       Manuals   1 8 21     PROM L shld resume 10 minutes  10 minutes                             Neuro Re-Ed                                                                7dTher Ex   1 8 21     AROM L wrist        AROM L elbow Palm up/down thumb up 4# 30 ea  Palm up/down thumb up 4# 40x ea              Pendulum ex        Pulleys  Flex/scap  Not avail  3 min ea       Cane AAROM Supine  Flex  3x30"       tableslides Wall slides   X 20 ea dir   Flex/scap  Slide/stretch  2x20/3"     isometrics        shrugs  rolls        Doorway stretch        scap retraction        UBE 70 rpm  8' alt 70 prm   10' alt  70 prm   10' alt     Active flex/scap to 90*        S/L abd /ER  20 x ea  resume X 20 ea  X 30 ea     Elbow flares        Prone flex, ext ,HA,rows 20 ea  review X 20 ea dir  X 30 ea dir      MTPs/LTPs Green  20x ea  @ home Green x 30 ea  Green x 30 ea     IR/ER grn  20ea  @ home Green x 30  Green x 30 ea                             Ther Activity                        Gait Training                        Modalities   1 8 21 HP to L shld CP with ESTIM 15 mins, IFC to left GHJ seated with UE supported Deferred to home application  Declined offer for MHP applic    Will con't to use Home TENS     CP   Declined offer for CP applic

## 2021-01-14 ENCOUNTER — EVALUATION (OUTPATIENT)
Dept: PHYSICAL THERAPY | Facility: CLINIC | Age: 56
End: 2021-01-14
Payer: OTHER MISCELLANEOUS

## 2021-01-14 DIAGNOSIS — Z98.890 S/P MUSCULOSKELETAL SYSTEM SURGERY: ICD-10-CM

## 2021-01-14 DIAGNOSIS — S46.012D TRAUMATIC COMPLETE TEAR OF LEFT ROTATOR CUFF, SUBSEQUENT ENCOUNTER: ICD-10-CM

## 2021-01-14 DIAGNOSIS — S46.012D TRAUMATIC TEAR OF LEFT ROTATOR CUFF, SUBSEQUENT ENCOUNTER: Primary | ICD-10-CM

## 2021-01-14 PROCEDURE — 97140 MANUAL THERAPY 1/> REGIONS: CPT

## 2021-01-14 PROCEDURE — 97110 THERAPEUTIC EXERCISES: CPT

## 2021-01-14 NOTE — PROGRESS NOTES
PT Re-Evaluation     Today's date: 2021  Patient name: Annmarie Aguilar  : 1965  MRN: [id number]  Referring provider: Pastora Greene MD  Dx:   Encounter Diagnosis     ICD-10-CM    1  Traumatic tear of left rotator cuff, subsequent encounter  S46 012D    2  S/P musculoskeletal system surgery  Z98 [id number]    3  Traumatic complete tear of left rotator cuff, subsequent encounter  S46 012D                   Assessment  Assessment details: Annmarie Aguilar is a 63 y o  female presenting to outpatient physical therapy with diagnosis of Traumatic tear of left rotator cuff, subsequent encounter  (primary encounter diagnosis)Pt underwent L rot cuff repair surgery on 20  Sony Templeton Patient's current impairments includeL shld  pain, impaired soft tissue mobility, reduced L shld  range of motion, reduced LUE  strength, reduced postural awareness, and reduced activity tolerance  Patient's present functional limitations include difficulty with ADLs with increased need for assistance, reliance on medication and/or modalities for pain relief, poor tolerance for functional mobility and activity, and difficulty completing work/HH  responsibilities  Patient to benefit from skilled outpatient physical therapy 2x/week for 12 weeks in order to reduce pain, maximize pain free range of motion, increase strength and stability, and improve functional mobility/functional activity in order to maximize return to prior level of function with reduced limitations  Thank you for your referral   20 UPDATE: Pt has had 7 OPPT visits to date  Pt has shown good improvement in PROM L shld with increased strength of L elbow, wrist and   Pain level usually 2-3/10 described as achiness  Pt notes improved functional ability with light tasks as seen by improved FOTO score  Pt has begun AAROM ex at week 4 and stormy [place] as of today  Pt will cont to benefit from skilled PT, pt to begin AROM at week 10 post op     20 UPDATE:  Pt has had 15 OPPT visits to date consisting of AAROM and PROM ex R shld  Pt recently returned to work light duty and has had increased pain ever since  Pain described as constant ache rated 5/10  States she had to resume use of naproxen and occ tramadol due to increase in symptoms  Pt has made good gains in PROM as seen in objective data  Pt to begin AROM ex next visit  Pt will benefit from skilled PT to promote increased AROM and strength R shld  Ptis worried about injuring RUE at work due to responsibilities placed on her despite work restrictions  1/14/21 UPDATE: Florinda Pinedo has had 25 OPPT visits to date with continued progress in AROM/PROM and strength as seen in objective data  Pt's FOTO score cont to improve indicating cont'd functional gains  Pt has no pain, just occ pinch or ache in the L shld  Pt has progressed to active and strengthening ex and will cont to benefit from skilled PT to advance toward goals  Impairments: abnormal or restricted ROM, activity intolerance, impaired physical strength, lacks appropriate home exercise program and pain with function  Barriers to therapy: obesity  Understanding of Dx/Px/POC: good   Prognosis: good    Goals  STGs to be achieved in 4 weeks:  1  Pt to demonstrate reduced subjective pain rating "at worst" by at least 2-3 points from Initial Eval in order to allow for reduced pain with ADLs and improved functional activity tolerance  NOT MET  2  Pt to demonstrate increased PROM of L shld by at least 10-20 degrees in order to allow for greater ease and independence with ADLs and functional mobility  MET and ONGOING  3  Pt to demonstrate full PROM of L shld in order to maximize joint mobility and function and allow for progression of exercise program and achievement of goals  MET  4  Pt to demonstrate increased MMT of L shld  by at least 1/2-1 grade in order to improve safety and stability with ADLs and functional mobility  NEW GOAL     LTGs to be achieved in 6-8 weeks:  1   Pt will be I with HEP in order to continue to improve quality of life and independence and reduce risk for re-injury  MET and ONGOING  2  Pt to demonstrate return to work without limitations or restrictions  NOT MET  3  Pt to demonstrate improved function as noted by achieving or exceeding predicted score on FOTO outcomes assessment tool  PROGRESSING      Plan  Plan details: Cont to progress strengthening ex  Patient would benefit from: skilled physical therapy  Planned modality interventions: cryotherapy  Planned therapy interventions: patient education, manual therapy, strengthening, stretching, therapeutic exercise and home exercise program  Frequency: 2x week  Duration in weeks: 8  Plan of Care beginning date: 1/14/2021  Plan of Care expiration date: 3/11/2021  Treatment plan discussed with: patient        Subjective Evaluation    History of Present Illness  Mechanism of injury: Back in FEB,pt was moving a large patient and felt a pop in her L shld  Pt had MRI L shld and referred to PT  Pt had several months of OT and L shld was doing well  Pt then fell in mid-Aug  And pt ust and pain in L shld escalated and pt made decision to undergo surgery for L rot cuff repair 9/23/20  Pt is wearing sling and mindy  Pt attended first f/u visit with Dr Donna Smart w/o sling   Pt states she was instr to wear sling x 4 weeks by the doctor  Pt states she did not actively use the L arm while sling was off but admits to washing dishes and doing laundry  Pt states  She has diff putting hair in a ponytail,unable to don bra, diff donning pants, unable to lift, diff hanging laundry on clothesline  Pt has resumed driving  11/2 UPDATE:  Pt states she only has a dull ache in the shld for the majority of the time  States she has not been able to use the arm and pain may increase with use  Pt notes improved strength of hand and elbow  Notes pinching sensation in clavicular area with bicep curls  DC'd sling as of today    12/2 UPDATE: Pt has increased pain in L shldr since return to work  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  Pt is pushing large med cart using only her R arm  Pt has had to resume pain meds taking naproxen every 12 hrs  Pt feels she has gone backwards since RTW  Has elevated pain described as constant ache  Also has poor sleep schedule since ret to night shift  Has diff reaching across beds and residents  Recent onset of muscle spasms in B neck, B UT and L shld  areas  21 UPDATE: Pt states she feels 75-80% improved from [facility]  States her motion is good and she just needs to keep working on strengthening  States she has no pain, just aches with weather or activity  Pt has most diff with overhead tasks  States she gets frustrated at lack of strength, dory when taking items out of cupboard  Drops item if it is too heavy  Pt has ret'd to work which she is handling better now  Not a recurrent problem   Quality of life: good    Pain  Current pain ratin  At best pain ratin  At worst pain ratin (overhead activity)  Location: L shld  Quality: dull ache, discomfort and tight (heavy)  Relieving factors: medications, support and rest  Exacerbated by: mvmt, cold weather    Progression: improved    Social Support  Steps to enter house: yes (7)  Stairs in house: no   Lives in: multiple-level home (stays on 1st floor)  Lives with: adult children and spouse    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, 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)  Hand dominance: right      Diagnostic Tests  MRI studies: abnormal  Treatments  Previous treatment: physical therapy, medication and injection treatment  Current treatment: medication and physical therapy  Patient Goals  Patient goals for therapy: decreased pain, increased motion, increased strength, independence with ADLs/IADLs and return to work          Objective     Postural Observations  Seated posture: good  Standing posture: good        Observations   Left Shoulder   Positive for incision (well healed porthole sites  )  Additional Observation Details  Sling and swathe L UE DC'd 11/2    Palpation     Additional Palpation Details  TTP ant L shld and bicep tendon insertion    Cervical/Thoracic Screen   Cervical range of motion within normal limits  Cervical range of motion within normal limits with the following exceptions: Pt has begun to experience B UT pain and spasms since RTW  Neurological Testing     Sensation     Shoulder   Left Shoulder   Intact: light touch, hot/cold discrimination and proprioception    Active Range of Motion   Left Shoulder   Flexion: 175 degrees   Extension: 55 degrees   Abduction: 160 degrees   External rotation 90°: 53 degrees   Internal rotation 90°: 55 degrees     Additional Active Range of Motion Details  No AROM measurements taken as pt to begin AROM  Next session as per protocol    Passive Range of Motion   Left Shoulder   Flexion: 180 degrees   Extension: 45 degrees   Abduction: 132 degrees   External rotation 45°: 60 degrees   Internal rotation 45°: 80 degrees     Additional Passive Range of Motion Details  No active mvmt until week 10 as per phys recs    Strength/Myotome Testing     Left Shoulder     Planes of Motion   Flexion: 3   Extension: 4+   Abduction: 3   External rotation at 0°: 4+   Internal rotation at 0°: 5     Isolated Muscles   Biceps: 4+     Right Shoulder   Normal muscle strength    Left Elbow   Flexion: 4-  Extension: 4    Additional Strength Details    L elbow flex/ext 4+/5             Precautions: PROM x4 weeks, add AAROM at week 4, AROM week 10, must wear sling x 4 weeks    Surgery date : 9/23/20  Re-eval Date: 2/12/21  Date 12/30 1/4 1 8 21 1/14 RE    Visit Count 21 22 23 24    FOTO    completed    Pain In 2  0/10  "Just Tight"   0    Pain Out better   less tight 0      Manuals   1 8 21 1/14/21    PROM L shld resume 10 minutes  10 minutes 10 min                            Neuro Re-Ed 7dTher Ex   1 8 21 1/14    AROM L wrist        AROM L elbow Palm up/down thumb up 4# 30 ea  Palm up/down thumb up 4# 40x ea              Pendulum ex        Pulleys  Flex/scap  Not avail  3 min ea       Cane AAROM Supine  Flex  3x30"       tableslides Wall slides   X 20 ea dir   Flex/scap  Slide/stretch  2x20/3" Wall   30 ea   Flex/scap    isometrics        shrugs  rolls        Doorway stretch        scap retraction        UBE 70 rpm  8' alt 70 prm   10' alt  70 prm   10' alt 70 rpm  10' alt    Active flex/scap to 90*        S/L abd /ER  20 x ea  resume X 20 ea  X 30 ea 30x ea    Elbow flares        Prone flex, ext ,HA,rows 20 ea  review X 20 ea dir  X 30 ea dir  1#  30 ea dir    MTPs/LTPs Green  20x ea  @ home Green x 30 ea  Green x 30 ea Green x 30 ea    IR/ER grn  20ea  @ home Green x 30  Green x 30 ea Green x 30 ea                            Ther Activity                        Gait Training                        Modalities   1 8 21     HP to L shld CP with ESTIM 15 mins, IFC to left GHJ seated with UE supported Deferred to home application  Declined offer for MHP applic    Will con't to use Home TENS     CP   Declined offer for CP applic

## 2021-01-18 ENCOUNTER — APPOINTMENT (OUTPATIENT)
Dept: PHYSICAL THERAPY | Facility: CLINIC | Age: 56
End: 2021-01-18
Payer: OTHER MISCELLANEOUS

## 2021-01-19 ENCOUNTER — OFFICE VISIT (OUTPATIENT)
Dept: PHYSICAL THERAPY | Facility: CLINIC | Age: 56
End: 2021-01-19
Payer: OTHER MISCELLANEOUS

## 2021-01-19 DIAGNOSIS — S46.012D TRAUMATIC TEAR OF LEFT ROTATOR CUFF, SUBSEQUENT ENCOUNTER: Primary | ICD-10-CM

## 2021-01-19 PROCEDURE — 97140 MANUAL THERAPY 1/> REGIONS: CPT

## 2021-01-19 PROCEDURE — 97110 THERAPEUTIC EXERCISES: CPT

## 2021-01-19 NOTE — PROGRESS NOTES
Daily Note     Today's date: 2021  Patient name: Tila Knight  : 1965  MRN: [id number]  Referring provider: Nisa Herrera MD  Dx:   Encounter Diagnosis     ICD-10-CM    1  Traumatic tear of left rotator cuff, subsequent encounter  S46 012D                   Subjective: Pt  states her L shoulder is "Just Achey"  Objective: See treatment diary below      Assessment: Tolerated treatment Fairly Well to Well overall with performance of Ther exer and tolerance to Manual Therapy  Pt  progressing consistently thus far  Plan:  Con't services 2x/week  Precautions: PROM x4 weeks, add AAROM at week 4, AROM week 10, must wear sling x 4 weeks    Surgery date : 20  Re-eval Date: 21  Date  1 8 21  RE 1  21   Visit Count 21 22 23 24 25   FOTO    completed    Pain In 2  0/10  "Just Tight"   0 0/10  "Just achey"     Pain Out better   less tight 0 0/10     Manuals   1 8 21 1 19 21   PROM L shld resume 10 minutes  10 minutes 10 min 10 min                           Neuro Re-Ed                                                                7dTher Ex   1 8  1 19 21   AROM L wrist        AROM L elbow Palm up/down thumb up 4# 30 ea  Palm up/down thumb up 4# 40x ea  Palm up/down thumb up 4# 40x ea            Pendulum ex        Pulleys  Flex/scap  Not avail  3 min ea    3 min ea   Cane AAROM Supine  Flex  3x30"       tableslides Wall slides   X 20 ea dir   Flex/scap  Slide/stretch  2x20/3" Wall   30 ea   Flex/scap Resume NV     isometrics        shrugs  rolls        Doorway stretch        scap retraction        UBE 70 rpm  8' alt 70 prm   10' alt  70 prm   10' alt 70 rpm  10' alt 70 rpm  10' alt   Active flex/scap to 90*        S/L abd /ER  20 x ea  resume X 20 ea  X 30 ea 30x ea 40x ea   Elbow flares        Prone flex, ext ,HA,rows 20 ea  review X 20 ea dir  X 30 ea dir  1#  30 ea dir 1#  30 ea dir   MTPs/LTPs Green  20x ea  @ home Green x 30 ea  Green x 30 ea Green x 30 ea Green 30x/3"ea   IR/ER grn  20ea  @ home Green x 30  Green x 30 ea Green x 30 ea Green x 30 ea                           Ther Activity                        Gait Training                        Modalities   1 8 21  1 19 21   HP to L shld CP with ESTIM 15 mins, IFC to left GHJ seated with UE supported Deferred to home application  Declined offer for MHP applic    Will con't to use Home TENS  Declined offer for MHP applic    Will con't to use Home TENS   CP   Declined offer for CP applic  Declined offer for CP applic

## 2021-01-21 ENCOUNTER — APPOINTMENT (OUTPATIENT)
Dept: PHYSICAL THERAPY | Facility: CLINIC | Age: 56
End: 2021-01-21
Payer: OTHER MISCELLANEOUS

## 2021-01-28 ENCOUNTER — OFFICE VISIT (OUTPATIENT)
Dept: PHYSICAL THERAPY | Facility: CLINIC | Age: 56
End: 2021-01-28
Payer: OTHER MISCELLANEOUS

## 2021-01-28 DIAGNOSIS — S46.012D TRAUMATIC TEAR OF LEFT ROTATOR CUFF, SUBSEQUENT ENCOUNTER: Primary | ICD-10-CM

## 2021-01-28 DIAGNOSIS — Z98.890 S/P MUSCULOSKELETAL SYSTEM SURGERY: ICD-10-CM

## 2021-01-28 DIAGNOSIS — S46.012D TRAUMATIC COMPLETE TEAR OF LEFT ROTATOR CUFF, SUBSEQUENT ENCOUNTER: ICD-10-CM

## 2021-01-28 PROCEDURE — 97140 MANUAL THERAPY 1/> REGIONS: CPT

## 2021-01-28 PROCEDURE — 97110 THERAPEUTIC EXERCISES: CPT

## 2021-01-28 NOTE — PROGRESS NOTES
Daily Note     Today's date: 2021  Patient name: Carmina Morales  : 1965  MRN: [id number]  Referring provider: Jo Vo MD  Dx:   Encounter Diagnosis     ICD-10-CM    1  Traumatic tear of left rotator cuff, subsequent encounter  S46 012D    2  S/P musculoskeletal system surgery  Z98 890    3  Traumatic complete tear of left rotator cuff, subsequent encounter  S46 012D                   Subjective: RTD 21 Pt states she is hurting today and feels it is due to the colder weather  Pain rated at 3/10 Notes pain ant shld and pain  inferior  to L  clavicle       Objective: See treatment diary below      Assessment: Tolerated treatment well  Patient demonstrated fatigue post treatment, exhibited good technique with therapeutic exercises and would benefit from continued PT for continued L shld strengthening  Pt has good PROM L shld and focus to be on strengthening  Plan: Continue per plan of care  Precautions: PROM x4 weeks, add AAROM at week 4, AROM week 10, must wear sling x 4 weeks    Surgery date : 20  Re-eval Date: 21  Date        Visit Count 26       FOTO        Pain In 3       Pain Out          Manuals     1 19 21   PROM L shld 10'    10 min                           Neuro Re-Ed                                                                Ther Ex     1 19 21   AROM L wrist        AROM L elbow Palm up/down thumb up 5# 30 ea    Palm up/down thumb up 4# 40x ea            Pendulum ex        Pulleys  Flex/scap      3 min ea   Cane AAROM        tableslides Wall slides   X30 ea dir   Flex/scap  Slide/stretch  2x20/3" Wall   30 ea   Flex/scap Resume NV     isometrics        shrugs  rolls        Doorway stretch        scap retraction        UBE 70 rpm  10' alt    70 rpm  10' alt   Active flex/scap to  End range 10 ea       S/L abd /ER  20 x ea  2#  Added flex    40x ea   Elbow flares        Prone flex, ext ,HA,rows 2#  ea  30 ea    1#  30 ea dir   MTPs/LTPs [place] 10#  20 ea    Green 30x/3"ea   IR/ER [place]  3#  20 ea    Green x 30 ea                           Ther Activity                        Gait Training                        Modalities   1 8 21  1 19 21   HP to L shld  Deferred to home application  Declined offer for MHP applic    Will con't to use Home TENS  Declined offer for MHP applic    Will con't to use Home TENS   CP   Declined offer for CP applic  Declined offer for CP applic

## 2021-02-12 ENCOUNTER — OFFICE VISIT (OUTPATIENT)
Dept: PHYSICAL THERAPY | Facility: CLINIC | Age: 56
End: 2021-02-12
Payer: OTHER MISCELLANEOUS

## 2021-02-12 DIAGNOSIS — S46.012D TRAUMATIC TEAR OF LEFT ROTATOR CUFF, SUBSEQUENT ENCOUNTER: Primary | ICD-10-CM

## 2021-02-12 PROCEDURE — 97140 MANUAL THERAPY 1/> REGIONS: CPT

## 2021-02-12 PROCEDURE — 97110 THERAPEUTIC EXERCISES: CPT

## 2021-02-12 NOTE — PROGRESS NOTES
Daily Note     Today's date: 2021  Patient name: Naty Holt  : 1965  MRN: [id number]  Referring provider: Reed Jama MD  Dx:   Encounter Diagnosis     ICD-10-CM    1  Traumatic tear of left rotator cuff, subsequent encounter  S46 012D                   Subjective:  "Just achey", is pt's response to status of L shoulder  Objective: See treatment diary below      Assessment: Tolerated treatment Fairly Well to Well overall with performance of ther exer, and tolerance to manual stretch  Pt  has progressed consistently to date  Noted increased ROM and flexibility @  L Shoulder/UE, as per pt  Feedback  Plan:  Con't services 2x/week  Precautions: PROM x4 weeks, add AAROM at week 4, AROM week 10, must wear sling x 4 weeks    Surgery date : 20  Re-eval Date: 21  Date  2 12 21      Visit Count 26 27      FOTO        Pain In 3 "just achey"      Pain Out  "feels good"        Manuals  2 12 21   1 19 21   PROM L shld 10' 10 min   10 min                           Neuro Re-Ed                                                                Ther Ex  2 12 21   1 19 21   AROM L wrist        AROM L elbow Palm up/down thumb up 5# 30 ea Palm up/down thumb up 5# 30 ea   Palm up/down thumb up 4# 40x ea            Pendulum ex        Pulleys  Flex/scap   3 min ea   3 min ea   Cane AAROM        tableslides Wall slides   X30 ea dir   Flex/scap Wall slides   X30 ea dir   Flex/scap Slide/stretch  2x20/3" Wall   30 ea   Flex/scap Resume NV     isometrics        shrugs  rolls        Doorway stretch        scap retraction        UBE 70 rpm  10' alt 70 rpm  10' alt   70 rpm  10' alt   Active flex/scap to  End range 10 ea 10x       S/L abd /ER  20 x ea  2#  Added flex 20 x ea  2#  Added flex   40x ea   Elbow flares        Prone flex, ext ,HA,rows 2#  ea  30 ea 2#  ea  30 ea   1#  30 ea dir   MTPs/LTPs [place] 10#  20 ea [place] 10#  20 ea   Green 30x/3"ea   IR/ER Gini  3#  20 ea [place] 3#  20 ea   Green x 30 ea                           Ther Activity                        Gait Training                        Modalities  2 12 21 1 8 21  1 19 21   HP to L shld  *Will apply @ home, prn    Will con't to use Home TENS     Declined offer for MHP applic    Will con't to use Home TENS  Declined offer for MHP applic    Will con't to use Home TENS   CP  *Will apply @ home, prn Declined offer for CP applic  Declined offer for CP applic

## 2021-02-15 ENCOUNTER — OFFICE VISIT (OUTPATIENT)
Dept: PHYSICAL THERAPY | Facility: CLINIC | Age: 56
End: 2021-02-15
Payer: OTHER MISCELLANEOUS

## 2021-02-15 DIAGNOSIS — S46.012D TRAUMATIC TEAR OF LEFT ROTATOR CUFF, SUBSEQUENT ENCOUNTER: Primary | ICD-10-CM

## 2021-02-15 PROCEDURE — 97110 THERAPEUTIC EXERCISES: CPT

## 2021-02-15 PROCEDURE — 97140 MANUAL THERAPY 1/> REGIONS: CPT

## 2021-02-15 NOTE — PROGRESS NOTES
Daily Note     Today's date: 2/15/2021  Patient name: Sandra Rivera  : 1965  MRN: [id number]  Referring provider: Grey Puri MD  Dx:   Encounter Diagnosis     ICD-10-CM    1  Traumatic tear of left rotator cuff, subsequent encounter  S46 012D                   Subjective: Pt  reports, "Just the normal achey-ness" @ L shoulder/UE  Objective: See treatment diary below      Assessment: Tolerated treatment Fairly Well to Well overall with performance of ther exer  and tolerance to Manual range/stretch  Diagonal reaching with elevation is pt's  main deficit as yet, as per pt feedback  Plan:  Con't  services 2x/week  Precautions: PROM x4 weeks, add AAROM at week 4, AROM week 10, must wear sling x 4 weeks    Surgery date : 20  Re-eval Date: 21    Date  2 12 21 2 15 21     Visit Count 26 27 28     FOTO   NV     Pain In 3 "just achey" "Just the normal achey-ness"     Pain Out  "feels good" "Good"       Manuals  2 12 21 2 15 21  1 19 21   PROM L shld 10' 10 min 10 min  10 min                           Neuro Re-Ed                                                                Ther Ex  2 12 21 2 15 21  1 19 21   AROM L wrist        AROM L elbow Palm up/down thumb up 5# 30 ea Palm up/down thumb up 5# 30 ea Palm up/down thumb up 5# 30 ea  Palm up/down thumb up 4# 40x ea            Pendulum ex        Pulleys  Flex/scap   3 min ea 3 min ea  3 min ea   Cane AAROM        tableslides Wall slides   X30 ea dir   Flex/scap Wall slides   X30 ea dir   Flex/scap Wall slides   X30 ea dir   Flex/scap Wall   30 ea   Flex/scap Resume NV     isometrics        shrugs  rolls        Doorway stretch        scap retraction        UBE 70 rpm  10' alt 70 rpm  10' alt 70 rpm  10' alt  70 rpm  10' alt   Active flex/scap to  End range 10 ea 10x  15x     S/L abd /ER  20 x ea  2#  Added flex 20 x ea  2#  Added flex   40x ea   Elbow flares        Prone flex, ext ,HA,rows 2#  ea  30 ea 2#  ea  30 ea 2# ea  40 ea  1#  30 ea dir   MTPs/LTPs [place] 10#  20 ea Gini 10#  20 ea [place] 10#  30 ea  Green 30x/3"ea   IR/ER Gini  3#  20 ea [place]  3#  20 ea Gini  3# 30x ea  Green x 30 ea   D1,D2 flex/ext   **L 1# 2x5 ea                     Ther Activity                        Gait Training                        Modalities  2 12 21 2 15 21  1 19 21   HP to L shld  *Will apply @ home, prn    Will con't to use Home TENS     *Will apply @ home, prn    Will con't to use Home TENS  Declined offer for MHP applic    Will con't to use Home TENS   CP  *Will apply @ home, prn *Will apply @ home, prn  Declined offer for CP applic

## 2021-02-23 ENCOUNTER — OFFICE VISIT (OUTPATIENT)
Dept: OBGYN CLINIC | Facility: CLINIC | Age: 56
End: 2021-02-23
Payer: OTHER MISCELLANEOUS

## 2021-02-23 ENCOUNTER — APPOINTMENT (OUTPATIENT)
Dept: RADIOLOGY | Facility: CLINIC | Age: 56
End: 2021-02-23
Payer: OTHER MISCELLANEOUS

## 2021-02-23 VITALS — HEIGHT: 67 IN | WEIGHT: 293 LBS | BODY MASS INDEX: 45.99 KG/M2

## 2021-02-23 DIAGNOSIS — M75.101 ROTATOR CUFF TEAR ARTHROPATHY OF RIGHT SHOULDER: ICD-10-CM

## 2021-02-23 DIAGNOSIS — S46.012D TRAUMATIC COMPLETE TEAR OF LEFT ROTATOR CUFF, SUBSEQUENT ENCOUNTER: Primary | ICD-10-CM

## 2021-02-23 DIAGNOSIS — M12.811 ROTATOR CUFF TEAR ARTHROPATHY OF RIGHT SHOULDER: ICD-10-CM

## 2021-02-23 DIAGNOSIS — Z12.11 ENCOUNTER FOR SCREENING COLONOSCOPY: ICD-10-CM

## 2021-02-23 DIAGNOSIS — S46.012D TRAUMATIC COMPLETE TEAR OF LEFT ROTATOR CUFF, SUBSEQUENT ENCOUNTER: ICD-10-CM

## 2021-02-23 PROCEDURE — 73030 X-RAY EXAM OF SHOULDER: CPT

## 2021-02-23 PROCEDURE — 20610 DRAIN/INJ JOINT/BURSA W/O US: CPT | Performed by: ORTHOPAEDIC SURGERY

## 2021-02-23 PROCEDURE — 99213 OFFICE O/P EST LOW 20 MIN: CPT | Performed by: ORTHOPAEDIC SURGERY

## 2021-02-23 RX ORDER — LIDOCAINE HYDROCHLORIDE 10 MG/ML
5 INJECTION, SOLUTION INFILTRATION; PERINEURAL
Status: COMPLETED | OUTPATIENT
Start: 2021-02-23 | End: 2021-02-23

## 2021-02-23 RX ORDER — BETAMETHASONE SODIUM PHOSPHATE AND BETAMETHASONE ACETATE 3; 3 MG/ML; MG/ML
6 INJECTION, SUSPENSION INTRA-ARTICULAR; INTRALESIONAL; INTRAMUSCULAR; SOFT TISSUE
Status: COMPLETED | OUTPATIENT
Start: 2021-02-23 | End: 2021-02-23

## 2021-02-23 RX ADMIN — LIDOCAINE HYDROCHLORIDE 5 ML: 10 INJECTION, SOLUTION INFILTRATION; PERINEURAL at 12:12

## 2021-02-23 RX ADMIN — BETAMETHASONE SODIUM PHOSPHATE AND BETAMETHASONE ACETATE 6 MG: 3; 3 INJECTION, SUSPENSION INTRA-ARTICULAR; INTRALESIONAL; INTRAMUSCULAR; SOFT TISSUE at 12:12

## 2021-02-23 NOTE — LETTER
February 23, 2021     Patient: Vish Fallon   YOB: 1965   Date of Visit: 2/23/2021       To Whom it May Concern:    Vish Fallon is under my professional care  She was seen in my office on 2/23/2021  She may return to work on 2/23/21  She should not ambuate patients, no overhead lifting  If you have any questions or concerns, please don't hesitate to call           Sincerely,          Logan Abbott MD        CC: No Recipients

## 2021-02-23 NOTE — PROGRESS NOTES
Assessment:     1  Traumatic complete tear of left rotator cuff, subsequent encounter    2  Encounter for screening colonoscopy    3  Rotator cuff tear arthropathy of right shoulder          Plan:     Problem List Items Addressed This Visit        Musculoskeletal and Integument    Traumatic complete tear of left rotator cuff - Primary    Relevant Orders    XR shoulder 2+ vw right    Rotator cuff tear arthropathy of right shoulder    Relevant Orders    Large joint arthrocentesis: R subacromial bursa      Other Visit Diagnoses     Encounter for screening colonoscopy        Relevant Orders    Ambulatory referral for colon cancer education           63 y o  female aprox  S/p left rotator cuff repair and biceps tenotomy, DOS 09/23/2020  Left shoulder is doing very well and she is making good progress  She will continue working on progressing on her strengthening  Right shoulder so signs of rotator cuff tear arthropathy  X-rays were reviewed with her  We discussed treatment options  I offered her cortisone injection today which she wished to proceed with  Please refer to the procedure note  They did commit to her that we will work on returning from call as and addressing any of her issues in a very timely manner  Was encouraged to give us a call if she has any other further issues  Her questions were answered today  Patient ID: Tanner Plascencia is a 63 y o  female  Chief Complaint:  Left shoulder post op     HPI:  63 y o  female presents to the office aprox  2 months s/p left rotator cuff repair with biceps tenotomy  In regards to the shoulder done states that she is very pleased overall  She has had no significant pain after surgery  Physical therapy has helped her significantly in regards to regaining her motion and strength    Unfortunately, when she was given a note to return to work with limited lifting using her left arm, to a short staffing she was asked to do much more was having to do a lot of lifting with the left arm  Discussed her significant worsening of pain  Patient is frustrated today because she and her  both tried to reach out to the office on several occasions  She stop by the office  She did not hear back from any of us  She has had to continue with work  Currently she does feel that from a work standpoint she is able to do most everything but overhead lifting and ambulating with patient's  She has expressed significant frustration, understandably, given the lack of response from our office  She is also having increased pain in her right shoulder which she thinks is primarily secondary to using it a lot more  This is keeping her from sleeping at night  Seven lot of trouble with that with overhead activities  It is hurting her much more than the left shoulder  She notes a lot of weakness in it  She has had no specific injury to it that she is recalls, but has been using it much more since her surgery on her left shoulder        Allergy:  No Known Allergies    Medications:  all current active meds have been reviewed    Past Medical History:  Past Medical History:   Diagnosis Date    Arthritis     Cancer [facility]     endometrial    Endometrial adenocarcinoma [facility] [facility]     Endometriosis     Facial twitching     [id number] resolved    Migraine     Obesity     Thyroid disease        Past Surgical History:  Past Surgical History:   Procedure Laterality Date    ANKLE FRACTURE SURGERY Left     ANKLE SURGERY      APPENDECTOMY      CHOLECYSTECTOMY      FRACTURE SURGERY Left     ankle    HYSTERECTOMY      HYSTEROSCOPY      KNEE ARTHROSCOPY Left     OOPHORECTOMY      MD KNEE SCOPE,MED/LAT MENISECTOMY Left 6/3/2019    Procedure: KNEE ARTHROSOCPY; partial medial meniscectomy and debridement, synovectomy, chondroplasty;  Surgeon: Nallely Zarate MD;  Location: [facility];  Service: Orthopedics    MD SHLDR ARTHROSCOP,SURG,W/ROTAT CUFF REPR Left 9/23/2020    Procedure: REPAIR ROTATOR CUFF  ARTHROSCOPIC with biceps tenotomy;  Surgeon: Gina Marcos MD;  Location: [facility];  Service: Orthopedics       Family History:  Family History   Problem Relation Age of Onset    Clotting disorder Sister         possible, history of multiple blood clots    Diabetes Father     Hypertension Father     Cancer Father         bladder cancer    Hypotension Family     No Known Problems Maternal Grandfather     Liver cancer Paternal Grandmother     Colon cancer Paternal Grandmother     No Known Problems Daughter     No Known Problems Maternal Grandmother     No Known Problems Daughter     Breast cancer additional onset Maternal Aunt     Bone cancer Maternal Aunt     No Known Problems Paternal Aunt        Social History:  Social History     Substance and Sexual Activity   Alcohol Use Yes    Comment: OCASSIONAL     Social History     Substance and Sexual Activity   Drug Use No     Social History     Tobacco Use   Smoking Status Never Smoker   Smokeless Tobacco Never Used   Tobacco Comment    former smoker in all scripts           ROS:  Review of Systems   All other systems reviewed and are negative  Objective:  BP Readings from Last 1 Encounters:   11/17/20 130/80      Wt Readings from Last 1 Encounters:   02/23/21 134 kg (296 lb)        BMI:   Estimated body mass index is 46 36 kg/m² as calculated from the following:    Height as of this encounter: 5' 7" (1 702 m)  Weight as of this encounter: 134 kg (296 lb)  EXAM:   Physical Exam  Vitals signs and nursing note reviewed  Constitutional:       Appearance: She is well-developed  HENT:      Head: Normocephalic and atraumatic  Eyes:      Pupils: Pupils are equal, round, and reactive to light  Neck:      Musculoskeletal: Neck supple  Pulmonary:      Effort: Pulmonary effort is normal       Breath sounds: Normal breath sounds  Skin:     General: Skin is warm and dry     Neurological:      Mental Status: She is alert and oriented to person, place, and time  Right Shoulder Exam     Tenderness   The patient is experiencing no tenderness  Range of Motion   The patient has normal right shoulder ROM  Muscle Strength   External rotation: 4/5   Supraspinatus: 4/5   Subscapularis: 4/5     Tests   Lincoln test: positive  Impingement: positive    Comments:    Crepitus with shoulder range of motion      Left Shoulder Exam     Tenderness   The patient is experiencing no tenderness  Range of Motion   External rotation: normal   Forward flexion: normal   Internal rotation 0 degrees: normal     Muscle Strength   Supraspinatus: 4/5   Subscapularis: 5/5   Biceps: 5/5     Other   Erythema: absent  Scars: present  Sensation: normal  Pulse: present             Radiographs:    X-rays of the right shoulder were taken today and were reviewed by myself  They show signs of rotator cuff tear arthropathy with high riding humeral head, early degenerative changes  Large joint arthrocentesis: R subacromial bursa  Universal Protocol:  Consent given by: patient  Time out: Immediately prior to procedure a "time out" was called to verify the correct patient, procedure, equipment, support staff and site/side marked as required    Supporting Documentation  Indications: pain   Procedure Details  Location: shoulder - R subacromial bursa  Preparation: Patient was prepped and draped in the usual sterile fashion  Needle size: 22 G  Ultrasound guidance: no  Approach: posterior  Medications administered: 5 mL lidocaine 1 %; 6 mg betamethasone acetate-betamethasone sodium phosphate 6 (3-3) mg/mL    Patient tolerance: patient tolerated the procedure well with no immediate complications  Dressing:  Sterile dressing applied

## 2021-02-24 ENCOUNTER — TELEPHONE (OUTPATIENT)
Dept: OBGYN CLINIC | Facility: HOSPITAL | Age: 56
End: 2021-02-24

## 2021-03-08 ENCOUNTER — OFFICE VISIT (OUTPATIENT)
Dept: PHYSICAL THERAPY | Facility: CLINIC | Age: 56
End: 2021-03-08
Payer: OTHER MISCELLANEOUS

## 2021-03-08 DIAGNOSIS — S46.012D TRAUMATIC TEAR OF LEFT ROTATOR CUFF, SUBSEQUENT ENCOUNTER: Primary | ICD-10-CM

## 2021-03-08 PROCEDURE — 97110 THERAPEUTIC EXERCISES: CPT

## 2021-03-08 NOTE — PROGRESS NOTES
Daily Note     Today's date: 3/8/2021  Patient name: Ana Laura Bennett  : 1965  MRN: [id number]  Referring provider: Mary Baldwin MD  Dx:   Encounter Diagnosis     ICD-10-CM    1  Traumatic tear of left rotator cuff, subsequent encounter  S46 012D                   Subjective: Pt states she saw Dr Kimberli Elmore and is to cont PT for strengthening L shldr  Also had R shldr checked due to imcreasing pain and weakness  States she was told she has an old tear that cannot be repaired and is bone on bone in the R shld  States they did discuss possible R TSR  Objective: See treatment diary below      Assessment: Tolerated treatment fair  Patient demonstrated fatigue post treatment and would benefit from continued PT Demonstrates poor to fair technique with overhead mvmt  Will benefit from cont'd PT  Plan: Continue per plan of care  Precautions: PROM x4 weeks, add AAROM at week 4, AROM week 10, must wear sling x 4 weeks    Surgery date : 20  Re-eval Date: 21    Date  2 12 21 2 15 21 3/8/21    Visit Count 26 27 28 34    FOTO   NV Completed L shld    Pain In 3 "just achey" "Just the normal achey-ness" 0 L   3-4/10 R    Pain Out  "feels good" "Good"       Manuals  2 12 21 2 15 21 3/8    PROM L shld 10' 10 min 10 min                             Neuro Re-Ed                                                                Ther Ex  2 12 21 2 15 21 3/8/21    AROM L wrist        AROM L elbow Palm up/down thumb up 5# 30 ea Palm up/down thumb up 5# 30 ea Palm up/down thumb up 5# 30 ea Palm up/down  Thumb up 5#  30 ea B             Pendulum ex        Pulleys  Flex/scap   3 min ea 3 min ea 3 min ea    Cane AAROM        tableslides Wall slides   X30 ea dir   Flex/scap Wall slides   X30 ea dir   Flex/scap Wall slides   X30 ea dir   Flex/scap Wall   30 ea   Flex/scap    isometrics        shrugs  rolls        Doorway stretch        scap retraction        UBE 70 rpm  10' alt 70 rpm  10' alt 70 rpm  10' alt 70  Rpm  10' alt    Active flex/scap to  End range 10 ea 10x  15x 20 ea    S/L abd /ER  20 x ea  2#  Added flex 20 x ea  2#  Added flex      Elbow flares        Prone flex, ext ,HA,rows 2#  ea  30 ea 2#  ea  30 ea 2#  ea  40 ea B  2#   40 L/ 20 R    MTPs/LTPs Rulo 10#  20 ea Gini 10#  20 ea Gini 10#  30 ea Rulo 10#  30ea    IR/ER Gini  3#  20 ea Rulo  3#  20 ea Rulo  3# 30x ea     D1,D2 flex/ext   **L 1# 2x5 ea                     Ther Activity                        Gait Training                        Modalities  2 12 21 2 15 21     HP to L shld  *Will apply @ home, prn    Will con't to use Home TENS     *Will apply @ home, prn    Will con't to use Home TENS     CP  *Will apply @ home, prn *Will apply @ home, prn Purse String (Simple) Text: Given the location of the defect and the characteristics of the surrounding skin a purse string closure was deemed most appropriate.  Undermining was performed circumfirentially around the surgical defect.  A purse string suture was then placed and tightened.

## 2021-03-16 ENCOUNTER — OFFICE VISIT (OUTPATIENT)
Dept: PHYSICAL THERAPY | Facility: CLINIC | Age: 56
End: 2021-03-16
Payer: OTHER MISCELLANEOUS

## 2021-03-16 DIAGNOSIS — S46.012D TRAUMATIC TEAR OF LEFT ROTATOR CUFF, SUBSEQUENT ENCOUNTER: Primary | ICD-10-CM

## 2021-03-16 PROCEDURE — 97110 THERAPEUTIC EXERCISES: CPT

## 2021-03-16 PROCEDURE — 97140 MANUAL THERAPY 1/> REGIONS: CPT

## 2021-03-16 NOTE — PROGRESS NOTES
Daily Note     Today's date: 3/16/2021  Patient name: Anushka Fontanez  : 1965  MRN: [id number]  Referring provider: Kian Michaels MD  Dx:   Encounter Diagnosis     ICD-10-CM    1  Traumatic tear of left rotator cuff, subsequent encounter  S46 012D                   Subjective: Pt  states her L shoulder is coming along "Fine"  R shoulder however is "throbbing 6-7/10" pain level  Objective: See treatment diary below      Assessment: Tolerated treatment Fairly Well overall with performance and tolerance to Ther Exer and Manual Therapy  Pt  has progressed very well thus far re: improvement of L shoulder  Plan: Con't services 2x/week  Pt  plans to request script from Ortho MD for PT for R shoulder treatment  Precautions: PROM x4 weeks, add AAROM at week 4, AROM week 10, must wear sling x 4 weeks    Surgery date : 20  Re-eval Date: 21    Date  2 12 21 2 15 21 3/8/21 3 16 21   Visit Count 26 27 28 29 30   FOTO   NV Completed L shld    Pain In 3 "just achey" "Just the normal achey-ness" 0 L   3-4/10 R 0/10 L shld  6-7/10 R shld   Pain Out  "feels good" "Good"  0/10 L shld  4-5/10 R shld     Manuals  2 12 21 2 15 21 3/8 3 16 21   PROM L shld 10' 10 min 10 min  10 min                           Neuro Re-Ed                                                                Ther Ex  2 12 21 2 15 21 3/8/21 3 16 21   AROM L wrist        AROM L elbow Palm up/down thumb up 5# 30 ea Palm up/down thumb up 5# 30 ea Palm up/down thumb up 5# 30 ea Palm up/down  Thumb up 5#  30 ea B Palm up/down  Thumb up 5#  40 ea B            Pendulum ex        Pulleys  Flex/scap   3 min ea 3 min ea 3 min ea 3 min ea   Cane AAROM        tableslides Wall slides   X30 ea dir   Flex/scap Wall slides   X30 ea dir   Flex/scap Wall slides   X30 ea dir   Flex/scap Wall   30 ea   Flex/scap Wall   40 ea   Flex/scap   isometrics        shrugs  rolls        Doorway stretch        scap retraction        UBE 70 rpm  10' alt 70 rpm  10' alt 70 rpm  10' alt 70  Rpm  10' alt 70  Rpm  10' alt   Active flex/scap to  End range 10 ea 10x  15x 20 ea 20x   S/L abd /ER  20 x ea  2#  Added flex 20 x ea  2#  Added flex      Elbow flares        Prone flex, ext ,HA,rows 2#  ea  30 ea 2#  ea  30 ea 2#  ea  40 ea B  2#   40 L/ 20 R B  2#   40 L   MTPs/LTPs Gini 10#  20 ea Gini 10#  20 ea Gini 10#  30 ea Gini 10#  Sharlene Brewster 10#  40ea   IR/ER Ding[place]  3#  20 ea Dingess  3#  20 ea Dingess  3# 30x ea Dingess  3# 30x ea Dingess  3# 40x ea   D1,D2 flex/ext   **L 1# 2x5 ea L 1# 2x5 ea L 1# 2x5 ea                   Ther Activity                        Gait Training                        Modalities  2 12 21 2 15 21  3 16 21   HP to L shld  *Will apply @ home, prn    Will con't to use Home TENS     *Will apply @ home, prn    Will con't to use Home TENS  Will apply @ home, prn    Will con't to use Home TENS   CP  *Will apply @ home, prn *Will apply @ home, prn  Will apply @ home, prn

## 2021-03-19 ENCOUNTER — OFFICE VISIT (OUTPATIENT)
Dept: PHYSICAL THERAPY | Facility: CLINIC | Age: 56
End: 2021-03-19
Payer: OTHER MISCELLANEOUS

## 2021-03-19 DIAGNOSIS — S46.012D TRAUMATIC TEAR OF LEFT ROTATOR CUFF, SUBSEQUENT ENCOUNTER: Primary | ICD-10-CM

## 2021-03-19 PROCEDURE — 97110 THERAPEUTIC EXERCISES: CPT

## 2021-03-19 PROCEDURE — 97140 MANUAL THERAPY 1/> REGIONS: CPT

## 2021-03-19 NOTE — PROGRESS NOTES
Daily Note     Today's date: 3/19/2021  Patient name: Chaim Godwin  : 1965  MRN: [id number]  Referring provider: Srinivas Acevedo MD  Dx:   Encounter Diagnosis     ICD-10-CM    1  Traumatic tear of left rotator cuff, subsequent encounter  S46 012D                   Subjective:  No new complaints or concerns voiced by pt today re: status of L shoulder  R shoulder still symptomatic and painful which pt states is "all the time", with greatest discomfort/pain with R UE activity  Objective: See treatment diary below      Assessment: Tolerated treatment Fairly Well to Well overall with performance of ther exer and tolerance to Manual Therapy for L UE  Otilio Purdy Plan: Con't services 2x/week  Precautions: PROM x4 weeks, add AAROM at week 4, AROM week 10, must wear sling x 4 weeks    Surgery date : 20  Re-eval Date: 21    Date 3 19 21 2 12 21 2 15 21 3/8/21 3 16 21   Visit Count 31 27 28 29 30   FOTO   NV Completed L shld    Pain In 0/10 L shoulder  4-6/10 R shoulder   "just achey" "Just the normal achey-ness" 0 L   3-4/10 R 0/10 L shld  6-7/10 R shld   Pain Out 0/10 L shoulder  4-6/10 R shoulder "feels good" "Good"  0/10 L shld  4-5/10 R shld     Manuals 3 19 21 2 12 21 2 15 21 3/8 3 16 21   PROM L shld 10' 10 min 10 min  10 min                           Neuro Re-Ed                                                                Ther Ex 3 19 21 2 12 21 2 15 21 3/8/21 3 16 21   AROM L wrist        AROM L elbow Palm up/down thumb up 5# 40 ea Palm up/down thumb up 5# 30 ea Palm up/down thumb up 5# 30 ea Palm up/down  Thumb up 5#  30 ea B Palm up/down  Thumb up 5#  40 ea B            Pendulum ex        Pulleys  Flex/scap  D/C 3 min ea 3 min ea 3 min ea 3 min ea   Cane AAROM        tableslides Wall slides   X40 ea dir   Flex/scap  Resume NV Wall slides   X30 ea dir   Flex/scap Wall slides   X30 ea dir   Flex/scap Wall   30 ea   Flex/scap Wall   40 ea   Flex/scap   isometrics        shrugs  rolls Doorway stretch        scap retraction        UBE 70 rpm  10' alt 70 rpm  10' alt 70 rpm  10' alt 70  Rpm  10' alt 70  Rpm  10' alt   Active flex/scap to  End range HEP 10x  15x 20 ea 20x   S/L abd /ER   20 x ea  2#  Added flex      Elbow flares        Prone flex, ext ,HA,rows 3#  ea  30 ea 2#  ea  30 ea 2#  ea  40 ea B  2#   40 L/ 20 R B  2#   40 L   MTPs/LTPs [place] 10#  36 ea Barnhart 10#  20 ea Gini 10#  27 ea Gini 10#  Donney Apo 10#  40ea   IR/ER Gini  3#  36 ea [place]  3#  20 ea [place]  3# 30x ea [place]  3# 30x ea Gini  3# 40x ea   D1,D2 flex/ext L 1# 1x10 ea  **L 1# 2x5 ea L 1# 2x5 ea L 1# 2x5 ea   Wall push-offs                Ther Activity                        Gait Training                        Modalities 3 19 21 2 12 21 2 15 21  3 16 21   HP to L shld Will apply @ home, prn    Will con't to use Home TENS *Will apply @ home, prn    Will con't to use Home TENS     *Will apply @ home, prn    Will con't to use Home TENS  Will apply @ home, prn    Will con't to use Home TENS   CP Will apply @ home, prn *Will apply @ home, prn *Will apply @ home, prn  Will apply @ home, prn

## 2021-03-23 ENCOUNTER — RA CDI HCC (OUTPATIENT)
Dept: OTHER | Facility: HOSPITAL | Age: 56
End: 2021-03-23

## 2021-03-23 NOTE — PROGRESS NOTES
[facility]  coding oppertunities             Chart reviewed, (number of) suggestions sent to provider: 1                 E66 01: Morbid (severe) obesity due to excess calories ([facility] ) -    PROVIDER DID NOT RESPOND TO THE SUGGESTION

## 2021-03-24 ENCOUNTER — OFFICE VISIT (OUTPATIENT)
Dept: PHYSICAL THERAPY | Facility: CLINIC | Age: 56
End: 2021-03-24
Payer: OTHER MISCELLANEOUS

## 2021-03-24 ENCOUNTER — APPOINTMENT (OUTPATIENT)
Dept: LAB | Facility: CLINIC | Age: 56
End: 2021-03-24
Payer: COMMERCIAL

## 2021-03-24 DIAGNOSIS — S46.012D TRAUMATIC TEAR OF LEFT ROTATOR CUFF, SUBSEQUENT ENCOUNTER: Primary | ICD-10-CM

## 2021-03-24 DIAGNOSIS — F41.9 ANXIETY: ICD-10-CM

## 2021-03-24 DIAGNOSIS — Z13.6 SCREENING FOR CARDIOVASCULAR CONDITION: ICD-10-CM

## 2021-03-24 DIAGNOSIS — S46.012D TRAUMATIC COMPLETE TEAR OF LEFT ROTATOR CUFF, SUBSEQUENT ENCOUNTER: ICD-10-CM

## 2021-03-24 DIAGNOSIS — Z11.4 SCREENING FOR HIV (HUMAN IMMUNODEFICIENCY VIRUS): ICD-10-CM

## 2021-03-24 DIAGNOSIS — M15.9 PRIMARY OSTEOARTHRITIS INVOLVING MULTIPLE JOINTS: ICD-10-CM

## 2021-03-24 DIAGNOSIS — F43.21 GRIEVING: ICD-10-CM

## 2021-03-24 DIAGNOSIS — Z13.1 SCREENING FOR DIABETES MELLITUS: ICD-10-CM

## 2021-03-24 LAB
ALBUMIN SERPL BCP-MCNC: 3.5 G/DL (ref 3.5–5)
ALP SERPL-CCNC: 147 U/L (ref 46–116)
ALT SERPL W P-5'-P-CCNC: 42 U/L (ref 12–78)
ANION GAP SERPL CALCULATED.3IONS-SCNC: 4 MMOL/L (ref 4–13)
AST SERPL W P-5'-P-CCNC: 21 U/L (ref 5–45)
BASOPHILS # BLD AUTO: 0.03 THOUSANDS/ΜL (ref 0–0.1)
BASOPHILS NFR BLD AUTO: 1 % (ref 0–1)
BILIRUB SERPL-MCNC: 0.41 MG/DL (ref 0.2–1)
BUN SERPL-MCNC: 21 MG/DL (ref 5–25)
CALCIUM SERPL-MCNC: 9.3 MG/DL (ref 8.3–10.1)
CHLORIDE SERPL-SCNC: 106 MMOL/L (ref 100–108)
CO2 SERPL-SCNC: 28 MMOL/L (ref 21–32)
CREAT SERPL-MCNC: 0.74 MG/DL (ref 0.6–1.3)
EOSINOPHIL # BLD AUTO: 0.1 THOUSAND/ΜL (ref 0–0.61)
EOSINOPHIL NFR BLD AUTO: 2 % (ref 0–6)
ERYTHROCYTE [DISTWIDTH] IN BLOOD BY AUTOMATED COUNT: 13.8 % (ref 11.6–15.1)
GFR SERPL CREATININE-BSD FRML MDRD: 91 ML/MIN/1.73SQ M
GLUCOSE P FAST SERPL-MCNC: 101 MG/DL (ref 65–99)
HCT VFR BLD AUTO: 44.7 % (ref 34.8–46.1)
HGB BLD-MCNC: 14.2 G/DL (ref 11.5–15.4)
IMM GRANULOCYTES # BLD AUTO: 0.01 THOUSAND/UL (ref 0–0.2)
IMM GRANULOCYTES NFR BLD AUTO: 0 % (ref 0–2)
LYMPHOCYTES # BLD AUTO: 1.7 THOUSANDS/ΜL (ref 0.6–4.47)
LYMPHOCYTES NFR BLD AUTO: 29 % (ref 14–44)
MCH RBC QN AUTO: 29.2 PG (ref 26.8–34.3)
MCHC RBC AUTO-ENTMCNC: 31.8 G/DL (ref 31.4–37.4)
MCV RBC AUTO: 92 FL (ref 82–98)
MONOCYTES # BLD AUTO: 0.48 THOUSAND/ΜL (ref 0.17–1.22)
MONOCYTES NFR BLD AUTO: 8 % (ref 4–12)
NEUTROPHILS # BLD AUTO: 3.61 THOUSANDS/ΜL (ref 1.85–7.62)
NEUTS SEG NFR BLD AUTO: 60 % (ref 43–75)
NRBC BLD AUTO-RTO: 0 /100 WBCS
PLATELET # BLD AUTO: 247 THOUSANDS/UL (ref 149–390)
PMV BLD AUTO: 12.1 FL (ref 8.9–12.7)
POTASSIUM SERPL-SCNC: 4.3 MMOL/L (ref 3.5–5.3)
PROT SERPL-MCNC: 7.7 G/DL (ref 6.4–8.2)
RBC # BLD AUTO: 4.87 MILLION/UL (ref 3.81–5.12)
SODIUM SERPL-SCNC: 138 MMOL/L (ref 136–145)
TSH SERPL DL<=0.05 MIU/L-ACNC: 2.46 UIU/ML (ref 0.36–3.74)
WBC # BLD AUTO: 5.93 THOUSAND/UL (ref 4.31–10.16)

## 2021-03-24 PROCEDURE — 97140 MANUAL THERAPY 1/> REGIONS: CPT

## 2021-03-24 PROCEDURE — 87389 HIV-1 AG W/HIV-1&-2 AB AG IA: CPT

## 2021-03-24 PROCEDURE — 84443 ASSAY THYROID STIM HORMONE: CPT

## 2021-03-24 PROCEDURE — 80053 COMPREHEN METABOLIC PANEL: CPT

## 2021-03-24 PROCEDURE — 36415 COLL VENOUS BLD VENIPUNCTURE: CPT

## 2021-03-24 PROCEDURE — 97110 THERAPEUTIC EXERCISES: CPT

## 2021-03-24 PROCEDURE — 85025 COMPLETE CBC W/AUTO DIFF WBC: CPT

## 2021-03-24 NOTE — PROGRESS NOTES
Daily Note     Today's date: 3/24/2021  Patient name: Naty Holt  : 1965  MRN: [id number]  Referring provider: Reed Jama MD  Dx:   Encounter Diagnosis     ICD-10-CM    1  Traumatic tear of left rotator cuff, subsequent encounter  S46 012D                   Subjective:  Pt  reports "2-3"/10 pain level @ B shoulders today  Objective: See treatment diary below      Assessment: Tolerated treatment Well overall with performance and tolerance to ther exer and Manual Therapy  Signif noted improvement with L shoulder ROM to date  Plan: Con't services 2x/week  Precautions: PROM x4 weeks, add AAROM at week 4, AROM week 10, must wear sling x 4 weeks    Surgery date : 20  Re-eval Date: 21    Date 3 19 21 3 24 21 2 15 21 3/8/21 3 16 21   Visit Count 31 32 28 29 30   FOTO   NV Completed L shld    Pain In 0/10 L shoulder  4-6/10 R shoulder   2-3/10 B shoulders "Just the normal achey-ness" 0 L   3-4/10 R 0/10 L shld  6-7/10 R shld   Pain Out 0/10 L shoulder  4-6/10 R shoulder 0/10 "Good"  0/10 L shld  4-5/10 R shld     Manuals 3 19 21 3 24 21 2 15 21 3/8 3 16 21   PROM L shld 10' 10 min 10 min  10 min                           Neuro Re-Ed                                                                Ther Ex 3 19 21 3 24 21 2 15 21 3/8/21 3 16 21   AROM L wrist        AROM L elbow Palm up/down thumb up 5# 40 ea Palm up/down thumb up 5# 45x ea Palm up/down thumb up 5# 30 ea Palm up/down  Thumb up 5#  30 ea B Palm up/down  Thumb up 5#  40 ea B            Pendulum ex        Pulleys  Flex/scap  D/C  3 min ea 3 min ea 3 min ea   Cane AAROM        tableslides Wall slides   X40 ea dir   Flex/scap  Resume NV Reviewed for HEP Wall slides   X30 ea dir   Flex/scap Wall   30 ea   Flex/scap Wall   40 ea   Flex/scap   isometrics        shrugs  rolls        Doorway stretch        scap retraction        UBE 70 rpm  10' alt 70 rpm  10' alt 70 rpm  10' alt 70  Rpm  10' alt 70  Rpm  10' alt   Active flex/scap to  End range HEP 10x  15x 20 ea 20x   S/L abd /ER   20 x ea  2#  Added flex      Elbow flares        Prone flex, ext ,HA,rows 3#  ea  30 ea 3#  ea  30 ea 2#  ea  40 ea B  2#   40 L/ 20 R B  2#   40 L   MTPs/LTPs [place] 10#  40 ea Gnii 10#  40 ea Gini 10#  27 ea Gini 10#  [place] 10#  40ea   IR/ER Gini  3#  36 ea [place]  3#  36 ea [place]  3# 30x ea [place]  3# 30x [place]  3# 40x ea   D1,D2 flex/ext L 1# 1x10 ea L 1# 2x10 ea **L 1# 2x5 ea L 1# 2x5 ea L 1# 2x5 ea   Wall push-offs                Ther Activity                        Gait Training                        Modalities 3 19 21 3 24 21 2 15 21  3 16 21   HP to L shld Will apply @ home, prn    Will con't to use Home TENS *Will apply @ home, prn    Will con't to use Home TENS     *Will apply @ home, prn    Will con't to use Home TENS  Will apply @ home, prn    Will con't to use Home TENS   CP Will apply @ home, prn *Will apply @ home, prn *Will apply @ home, prn  Will apply @ home, prn

## 2021-03-25 LAB — HIV 1+2 AB+HIV1 P24 AG SERPL QL IA: NORMAL

## 2021-03-26 ENCOUNTER — OFFICE VISIT (OUTPATIENT)
Dept: PHYSICAL THERAPY | Facility: CLINIC | Age: 56
End: 2021-03-26
Payer: OTHER MISCELLANEOUS

## 2021-03-26 DIAGNOSIS — S46.012D TRAUMATIC TEAR OF LEFT ROTATOR CUFF, SUBSEQUENT ENCOUNTER: Primary | ICD-10-CM

## 2021-03-26 PROCEDURE — 97110 THERAPEUTIC EXERCISES: CPT

## 2021-03-26 PROCEDURE — 97140 MANUAL THERAPY 1/> REGIONS: CPT

## 2021-03-26 NOTE — PROGRESS NOTES
Daily Note     Today's date: 3/26/2021  Patient name: Anushka Fontanez  : 1965  MRN: [id number]  Referring provider: Kian Michaels MD  Dx:   Encounter Diagnosis     ICD-10-CM    1  Traumatic tear of left rotator cuff, subsequent encounter  S46 012D                   Subjective: Pt  states L shoulder is "good",  however R shoulder is "the same" re: pain and persistent sx  Objective: See treatment diary below      Assessment: Tolerated treatment Fairly Well to Well overall with performance of ther exer and tolerance to manual stretch  Plan:  Con't services 2x/week  Precautions: PROM x4 weeks, add AAROM at week 4, AROM week 10, must wear sling x 4 weeks    Surgery date : 20  Re-eval Date: 21    Date 3 19 21 3 24 21 3 26 21 3/8/21 3 16 21   Visit Count 31 32 33 29 30   FOTO    Completed L shld    Pain In 0/10 L shoulder  4-6/10 R shoulder   2-3/10 B shoulders L 0/10  R 3-4/10 0 L   3-4/10 R 0/10 L shld  6-7/10 R shld   Pain Out 0/10 L shoulder  4-6/10 R shoulder 0/10 0/10 L shoulder  R shld - same  0/10 L shld  4-5/10 R shld     Manuals 3 19 21 3 24 21 3 26 21 3/8 3 16 21   PROM L shld 10' 10 min 10 min  10 min                           Neuro Re-Ed                                                                Ther Ex 3 19 21 3 24 21 3 26 21 3/8/21 3 16 21   AROM L wrist        AROM L elbow Palm up/down thumb up 5# 40 ea Palm up/down thumb up 5# 45x ea Palm up/down thumb up 5# 3x15 ea Palm up/down  Thumb up 5#  30 ea B Palm up/down  Thumb up 5#  40 ea B            Pendulum ex        Pulleys  Flex/scap  D/C   3 min ea 3 min ea   Cane AAROM        tableslides Wall slides   X40 ea dir   Flex/scap  Resume NV Reviewed for HEP *HEP Wall   30 ea   Flex/scap Wall   40 ea   Flex/scap   isometrics        shrugs  rolls        Doorway stretch        scap retraction        UBE 70 rpm  10' alt 70 rpm  10' alt 70 rpm  10' alt 70  Rpm  10' alt 70  Rpm  10' alt   Active flex/scap to  End range HEP 10x 15x 20 ea 20x   S/L abd /ER   20 x ea  2#  Added flex 20 x ea  2#  Added flex     Elbow flares        Prone flex, ext ,HA,rows 3#  ea  30 ea 3#  ea  30 ea 3#  4x10 ea  40 ea B  2#   40 L/ 20 R B  2#   40 L   MTPs/LTPs [place] 10#  36 ea [place] 10#  36 ea [place] 12#  3x15 ea [place] 10#  Justin Deis 10#  40ea   IR/ER Gini  3#  36 ea [place]  3#  36 ea Gini  3# 45x ea Gini  3# 30x Елена Radish  3# 40x ea   D1,D2 flex/ext L 1# 1x10 ea L 1# 2x10 ea L 2# 1x20 ea L 1# 2x5 ea L 1# 2x5 ea   Wall push-offs                Ther Activity                        Gait Training                        Modalities 3 19 21 3 24 21 3 26 21  3 16 21   HP to L shld Will apply @ home, prn    Will con't to use Home TENS *Will apply @ home, prn    Will con't to use Home TENS     *Will apply @ home, prn    Will con't to use Home TENS  Will apply @ home, prn    Will con't to use Home TENS   CP Will apply @ home, prn *Will apply @ home, prn *Will apply @ home, prn  Will apply @ home, prn

## 2021-03-29 ENCOUNTER — OFFICE VISIT (OUTPATIENT)
Dept: INTERNAL MEDICINE CLINIC | Facility: CLINIC | Age: 56
End: 2021-03-29
Payer: COMMERCIAL

## 2021-03-29 ENCOUNTER — OFFICE VISIT (OUTPATIENT)
Dept: PHYSICAL THERAPY | Facility: CLINIC | Age: 56
End: 2021-03-29
Payer: OTHER MISCELLANEOUS

## 2021-03-29 VITALS
WEIGHT: 293 LBS | HEART RATE: 87 BPM | SYSTOLIC BLOOD PRESSURE: 134 MMHG | DIASTOLIC BLOOD PRESSURE: 82 MMHG | TEMPERATURE: 97.9 F | HEIGHT: 67 IN | OXYGEN SATURATION: 97 % | BODY MASS INDEX: 45.99 KG/M2

## 2021-03-29 DIAGNOSIS — Z12.31 VISIT FOR SCREENING MAMMOGRAM: ICD-10-CM

## 2021-03-29 DIAGNOSIS — F41.9 ANXIETY: ICD-10-CM

## 2021-03-29 DIAGNOSIS — Z90.710 S/P TOTAL ABDOMINAL HYSTERECTOMY AND BILATERAL SALPINGO-OOPHORECTOMY: ICD-10-CM

## 2021-03-29 DIAGNOSIS — S46.012D TRAUMATIC TEAR OF LEFT ROTATOR CUFF, SUBSEQUENT ENCOUNTER: Primary | ICD-10-CM

## 2021-03-29 DIAGNOSIS — Z90.722 S/P TOTAL ABDOMINAL HYSTERECTOMY AND BILATERAL SALPINGO-OOPHORECTOMY: ICD-10-CM

## 2021-03-29 DIAGNOSIS — I48.0 PAROXYSMAL ATRIAL FIBRILLATION (HCC): ICD-10-CM

## 2021-03-29 DIAGNOSIS — M15.9 PRIMARY OSTEOARTHRITIS INVOLVING MULTIPLE JOINTS: Primary | ICD-10-CM

## 2021-03-29 DIAGNOSIS — R05.9 COUGH: ICD-10-CM

## 2021-03-29 DIAGNOSIS — E55.9 VITAMIN D DEFICIENCY: ICD-10-CM

## 2021-03-29 DIAGNOSIS — Z90.79 S/P TOTAL ABDOMINAL HYSTERECTOMY AND BILATERAL SALPINGO-OOPHORECTOMY: ICD-10-CM

## 2021-03-29 DIAGNOSIS — E03.9 HYPOTHYROIDISM, UNSPECIFIED TYPE: ICD-10-CM

## 2021-03-29 PROCEDURE — 99214 OFFICE O/P EST MOD 30 MIN: CPT | Performed by: FAMILY MEDICINE

## 2021-03-29 PROCEDURE — 97110 THERAPEUTIC EXERCISES: CPT

## 2021-03-29 PROCEDURE — 1036F TOBACCO NON-USER: CPT | Performed by: FAMILY MEDICINE

## 2021-03-29 PROCEDURE — 3008F BODY MASS INDEX DOCD: CPT | Performed by: FAMILY MEDICINE

## 2021-03-29 RX ORDER — ALBUTEROL SULFATE 90 UG/1
2 AEROSOL, METERED RESPIRATORY (INHALATION) EVERY 6 HOURS PRN
Qty: 8.5 G | Refills: 0 | Status: SHIPPED | OUTPATIENT
Start: 2021-03-29 | End: 2022-03-01

## 2021-03-29 RX ORDER — DICLOFENAC POTASSIUM 50 MG/1
50 TABLET, FILM COATED ORAL 2 TIMES DAILY PRN
Qty: 60 TABLET | Refills: 1 | Status: SHIPPED | OUTPATIENT
Start: 2021-03-29 | End: 2022-02-03

## 2021-03-29 NOTE — PROGRESS NOTES
Assessment/Plan:    No problem-specific Assessment & Plan notes found for this encounter  Diagnoses and all orders for this visit:    Primary osteoarthritis involving multiple joints  -     diclofenac potassium (CATAFLAM) 50 mg tablet; Take 1 tablet (50 mg total) by mouth 2 (two) times a day as needed (pain with food)  -     CBC and differential; Future    Cough  -     albuterol (ProAir HFA) 90 mcg/act inhaler; Inhale 2 puffs every 6 (six) hours as needed for wheezing    Hypothyroidism, unspecified type  -     Lipid panel; Future  -     TSH, 3rd generation; Future    Paroxysmal atrial fibrillation (HCC)  -     CBC and differential; Future  -     Comprehensive metabolic panel; Future    Visit for screening mammogram  -     Mammo screening bilateral w 3d & cad; Future    Anxiety  -     CBC and differential; Future    Vitamin D deficiency  -     Vitamin D 25 hydroxy; Future    S/P total abdominal hysterectomy and bilateral salpingo-oophorectomy    Other orders  -     Cancel: Ambulatory referral to Gynecology; Future        Orders and recommendations as noted above  Recent laboratory testing reviewed with her  TSH is stable  She has been off of the levothyroxine  Will continue follow TSH with routine laboratory testing  Watch for signs and symptoms of hypothyroidism  Has not had any chest pain or palpitations  Watch for any symptoms that she had had previously with the paroxysmal atrial fibrillation  Anxiety symptoms relatively well controlled  Does not take the lorazepam regularly  Watch for any worsening of symptoms  Continue with vitamin-D supplementation on a regular basis  Slip given for mammogram   Recommend bone density over the next few years  Continue follow-up with physical therapy regularly for the shoulder issues and continue follow-up with Orthopedics  She declines any vaccinations  She is agreeable to the colo guard    She is agreeable to the mammogram   Coronavirus precautions discussed  Will have her follow up in about 6 months or sooner if needed  Subjective:      Patient ID: Carmina Morales is a 63 y o  female  She presents for follow-up  Mood has improved somewhat  She has been trying to deal with the sudden death of her daughter in the fall and still struggles with this at times  She is now having court battles and custody issues for her grandson  She continues to have issues with her shoulders  Had shoulder surgery on the left and after returning to work injured her right shoulder  Has been going to therapy and following up with Orthopedics  Anxiety symptoms have improved somewhat  Does not take the lorazepam regularly  Usually sleeps relatively well  Denies any signs or symptoms of hypothyroidism  Appetite has remained stable  Denies any nausea, vomiting, or diarrhea  Denies any changes in bowel movements  Breathing has been relatively stable  Uses the albuterol inhaler on an as-needed basis  Continues with multivitamin as well as fish oil and vitamin-D on a regular basis  The following portions of the patient's history were reviewed and updated as appropriate:   She  has a past medical history of Arthritis, Cancer ([facility] [address] ), Endometrial adenocarcinoma ([facility] [address] ), Endometriosis, Facial twitching, Migraine, Obesity, and Thyroid disease    She   Patient Active Problem List    Diagnosis Date Noted    Vitamin D deficiency 03/29/2021    S/P total abdominal hysterectomy and bilateral salpingo-oophorectomy 03/29/2021    Rotator cuff arthropathy of right shoulder 02/23/2021    Rotator cuff tear arthropathy of right shoulder 02/23/2021    Left ankle pain 10/29/2020    Anxiety 10/29/2020    Grieving 10/29/2020    Osteoarthritis of multiple joints 10/29/2020    Traumatic complete tear of left rotator cuff 06/03/2020    Chronic left shoulder pain 06/03/2020    History of endometrial cancer 06/06/2019    Screening for cardiovascular condition 06/06/2019    Screening for diabetes mellitus 06/06/2019    Visit for screening mammogram 06/06/2019    Need for hepatitis C screening test 06/06/2019    Chronic midline low back pain without sciatica 06/20/2018    Nasal congestion 07/06/2016    Degenerative arthritis of cervical spine 06/06/2016    Lung nodule 06/06/2016    Obesity due to excess calories 05/29/2016    Pulmonary nodule 05/29/2016    Hyperesthesia 07/30/2015    Migraine headache 07/30/2015    Obstructive sleep apnea 07/30/2015    Frequent headaches 10/28/2014    Hypothyroidism 10/28/2014    Paresthesias 10/28/2014    Paroxysmal atrial fibrillation ([facility] [address] ) 10/28/2014     She  has a past surgical history that includes Ankle fracture surgery (Left); Hysterectomy; Cholecystectomy; Appendectomy; Ankle surgery; Hysteroscopy; Oophorectomy; pr knee scope,med/lat menisectomy (Left, 6/3/2019); Fracture surgery (Left); Knee arthroscopy (Left); and pr shldr arthroscop,surg,w/rotat cuff repr (Left, 9/23/2020)  Her family history includes Bone cancer in her maternal aunt; Breast cancer additional onset in her maternal aunt; Cancer in her father; Clotting disorder in her sister; Colon cancer in her paternal grandmother; Diabetes in her father; Hypertension in her father; Hypotension in her family; Liver cancer in her paternal grandmother; No Known Problems in her daughter, daughter, maternal grandfather, maternal grandmother, and paternal aunt  She  reports that she has never smoked  She has never used smokeless tobacco  She reports current alcohol use  She reports that she does not use drugs    Current Outpatient Medications   Medication Sig Dispense Refill    albuterol (ProAir HFA) 90 mcg/act inhaler Inhale 2 puffs every 6 (six) hours as needed for wheezing 8 5 g 0    albuterol (PROVENTIL HFA,VENTOLIN HFA) 90 mcg/act inhaler Inhale 2 puffs every 6 (six) hours as needed for wheezing      diclofenac potassium (CATAFLAM) 50 mg tablet Take 1 tablet (50 mg total) by mouth 2 (two) times a day as needed (pain with food) 60 tablet 1    KRILL OIL PO Take 1 tablet by mouth daily      LORazepam (ATIVAN) 0 5 mg tablet Take 1 tablet (0 5 mg total) by mouth 2 (two) times a day as needed for anxiety 60 tablet 1    Multiple Vitamin (MULTIVITAMIN) tablet Take 1 tablet by mouth daily      Omega-3 Fatty Acids (FISH OIL) 1,000 mg Take 1,000 mg by mouth daily   Vitamin D, Cholecalciferol, 1000 UNITS TABS Take by mouth       No current facility-administered medications for this visit  Current Outpatient Medications on File Prior to Visit   Medication Sig    albuterol (PROVENTIL HFA,VENTOLIN HFA) 90 mcg/act inhaler Inhale 2 puffs every 6 (six) hours as needed for wheezing    KRILL OIL PO Take 1 tablet by mouth daily    LORazepam (ATIVAN) 0 5 mg tablet Take 1 tablet (0 5 mg total) by mouth 2 (two) times a day as needed for anxiety    Multiple Vitamin (MULTIVITAMIN) tablet Take 1 tablet by mouth daily    Omega-3 Fatty Acids (FISH OIL) 1,000 mg Take 1,000 mg by mouth daily   Vitamin D, Cholecalciferol, 1000 UNITS TABS Take by mouth    [DISCONTINUED] albuterol (ProAir HFA) 90 mcg/act inhaler Inhale 2 puffs every 6 (six) hours as needed for wheezing    [DISCONTINUED] diclofenac potassium (CATAFLAM) 50 mg tablet Take 1 tablet (50 mg total) by mouth 2 (two) times a day as needed (pain with food)     No current facility-administered medications on file prior to visit  She has No Known Allergies       Review of Systems   Constitutional: Positive for fatigue  Negative for activity change, appetite change, chills and fever  HENT: Negative for congestion and rhinorrhea  Eyes: Negative for visual disturbance  Respiratory: Negative for cough, chest tightness and shortness of breath  Cardiovascular: Negative for chest pain and palpitations  Gastrointestinal: Negative for abdominal pain, blood in stool, diarrhea, nausea and vomiting     Endocrine: Negative for polydipsia, polyphagia and polyuria  Genitourinary: Negative for dysuria, frequency and urgency  Musculoskeletal: Positive for arthralgias  Negative for gait problem  Skin: Negative for color change  Neurological: Negative for dizziness, light-headedness and headaches  Hematological: Does not bruise/bleed easily  Psychiatric/Behavioral: Negative for confusion and sleep disturbance  The patient is not nervous/anxious  As per HPI         Objective:      /82 (BP Location: Left arm, Patient Position: Sitting, Cuff Size: Large)   Pulse 87   Temp 97 9 °F (36 6 °C)   Ht 5' 7" (1 702 m)   Wt 136 kg (299 lb 11 2 oz)   SpO2 97%   BMI 46 94 kg/m²          Physical Exam  Vitals signs and nursing note reviewed  Constitutional:       General: She is not in acute distress  Appearance: She is well-developed and well-groomed  She is obese  HENT:      Head: Normocephalic and atraumatic  Eyes:      General:         Right eye: No discharge  Left eye: No discharge  Conjunctiva/sclera: Conjunctivae normal       Pupils: Pupils are equal, round, and reactive to light  Neck:      Thyroid: No thyromegaly  Vascular: No carotid bruit  Cardiovascular:      Rate and Rhythm: Normal rate and regular rhythm  Heart sounds: Normal heart sounds  No murmur  No friction rub  No gallop  Pulmonary:      Effort: No respiratory distress  Breath sounds: No wheezing or rales  Abdominal:      General: Bowel sounds are normal  There is no distension  Tenderness: There is no abdominal tenderness  Lymphadenopathy:      Cervical: No cervical adenopathy  Skin:     General: Skin is warm and dry  Neurological:      Mental Status: She is alert and oriented to person, place, and time  Psychiatric:         Mood and Affect: Mood and affect normal          Speech: Speech normal          Behavior: Behavior normal  Behavior is cooperative  BMI Counseling:  Body mass index is 46 94 kg/m²  The BMI is above normal  Nutrition recommendations include encouraging healthy choices of fruits and vegetables, consuming healthier snacks, limiting drinks that contain sugar, moderation in carbohydrate intake and reducing intake of cholesterol  Exercise recommendations include exercising 3-5 times per week

## 2021-03-29 NOTE — PROGRESS NOTES
Daily Note     Today's date: 3/29/2021  Patient name: Enmanuel Pierre  : 1965  MRN: [id number]  Referring provider: Colette Delcid MD  Dx:   Encounter Diagnosis     ICD-10-CM    1  Traumatic tear of left rotator cuff, subsequent encounter  S46 012D                   Subjective: Pt states her shoulders are "alright" and notes the R shld is worse than the L  Pain level 3/10 R, 0/10 L  Objective: See treatment diary below      Assessment: Tolerated treatment well  Patient demonstrated fatigue post treatment, exhibited good technique with therapeutic exercises and would benefit from continued PT  Performed all ex B due to R shld pain and weakness  Plan: Continue per plan of care  Precautions: PROM x4 weeks, add AAROM at week 4, AROM week 10, must wear sling x 4 weeks    Surgery date : 20  Re-eval Date: 21    Date 3 19 21 3 24 21 3 26 21 3/29    Visit Count 31 32 33 34    FOTO    due    Pain In 0/10 L shoulder  4-6/10 R shoulder   2-3/10 B shoulders L 0/10  R 3-4/10  L 0/10  R 3/10    Pain Out 0/10 L shoulder  4-6/10 R shoulder 0/10 0/10 L shoulder  R shld - same  0/10 B shld      Manuals 3 19 21 3 24 21 3 26 21 3/29    PROM L shld 10' 10 min 10 min declined                            Neuro Re-Ed                                                                Ther Ex 3 19 21 3 24 21 3 26 21  3/29    AROM L wrist        AROM L elbow Palm up/down thumb up 5# 40 ea Palm up/down thumb up 5# 45x ea Palm up/down thumb up 5# 3x15 ea  Brian  Palm up/down thumb up 5# 45x ea             Pendulum ex        Pulleys  Flex/scap  D/C       Cane AAROM        tableslides Wall slides   X40 ea dir   Flex/scap  Resume NV Reviewed for HEP *HEP     isometrics        shrugs  rolls        Doorway stretch        scap retraction        UBE 70 rpm  10' alt 70 rpm  10' alt 70 rpm  10' alt 70  Rpm  10' alt    Active flex/scap to  End range HEP 10x  15x     S/L abd /ER   20 x ea  2#  Added flex 20 x ea  2#  Added flex S/L  3# abd/ER/flex  2 x 15    Elbow flares        Prone flex, ext ,HA,rows 3#  ea  30 ea 3#  ea  30 ea 3#  4x10 ea  40 ea 3#  4x10 ea  40 ea B    MTPs/LTPs [place] 10#  40 ea [place] 10#  40 ea [place] 12#  3x15 ea [place] 12#  3x15 ea    IR/ER [place]  3#  40 ea [place]  3#  40 ea [place]  3# 45x ea [place]    ER 5#  45  IR  8#  39    D1,D2 flex/ext L 1# 1x10 ea L 1# 2x10 ea L 2# 1x20 ea B  2# 20 ea    Wall push-offs                Ther Activity                        Gait Training                        Modalities 3 19 21 3 24 21 3 26 21     HP to L shld Will apply @ home, prn    Will con't to use Home TENS *Will apply @ home, prn    Will con't to use Home TENS     *Will apply @ home, prn    Will con't to use Home TENS     CP Will apply @ home, prn *Will apply @ home, prn *Will apply @ home, prn

## 2021-04-02 ENCOUNTER — OFFICE VISIT (OUTPATIENT)
Dept: PHYSICAL THERAPY | Facility: CLINIC | Age: 56
End: 2021-04-02
Payer: OTHER MISCELLANEOUS

## 2021-04-02 DIAGNOSIS — S46.012D TRAUMATIC TEAR OF LEFT ROTATOR CUFF, SUBSEQUENT ENCOUNTER: Primary | ICD-10-CM

## 2021-04-02 PROCEDURE — 97140 MANUAL THERAPY 1/> REGIONS: CPT

## 2021-04-02 PROCEDURE — 97110 THERAPEUTIC EXERCISES: CPT

## 2021-04-02 NOTE — PROGRESS NOTES
Daily Note     Today's date: 2021  Patient name: Mack Rogers  : 1965  MRN: [id number]  Referring provider: Kelvin Alcocer MD  Dx:   Encounter Diagnosis     ICD-10-CM    1  Traumatic tear of left rotator cuff, subsequent encounter  S46 012D                   Subjective: Pt  reports L UE/Shoulder is "Fine", however R shoulder is about a 3-4/10 re: current pain level  Objective: See treatment diary below      Assessment: Tolerated treatment Fairly Well to Well overall with performance of ther exer and tolerance to Manual Therapy  Plan: Con't services 2x/week  Precautions: PROM x4 weeks, add AAROM at week 4, AROM week 10, must wear sling x 4 weeks    Surgery date : 20  Re-eval Date: 21    Date 3 19 21 3 24 21 3 26 21 3/29 4 2 21   Visit Count 31 32 33 34 35   FOTO    due    Pain In 0/10 L shoulder  4-6/10 R shoulder   2-3/10 B shoulders L 0/10  R 3-4/10  L 0/10  R 3/10 L 0/10  R 3-4/10   Pain Out 0/10 L shoulder  4-6/10 R shoulder 0/10 0/10 L shoulder  R shld - same  0/10 B shld 0/10 L shld  R shld - same     Manuals 3 19 21 3 24 21 3 26 21 3/29 4 2 21   PROM L shld 10' 10 min 10 min declined 10 min                           Neuro Re-Ed                                                                Ther Ex 3 19 21 3 24 21 3 26 21  3/29 4 2 21   AROM L wrist        AROM L elbow Palm up/down thumb up 5# 40 ea Palm up/down thumb up 5# 45x ea Palm up/down thumb up 5# 3x15 ea  Brian  Palm up/down thumb up 5# 45x ea Palm up/down thumb up 5# 3x15 ea            Pendulum ex        Pulleys  Flex/scap  D/C       Cane AAROM        tableslides Wall slides   X40 ea dir   Flex/scap  Resume NV Reviewed for HEP *HEP     isometrics        shrugs  rolls        Doorway stretch        scap retraction        UBE 70 rpm  10' alt 70 rpm  10' alt 70 rpm  10' alt 70  Rpm  10' alt 70  Rpm  10' alt   Active flex/scap to  End range HEP 10x  15x     S/L abd /ER   20 x ea  2#  Added flex 20 x ea  2#  Added flex S/L  3# abd/ER/flex  2 x 15 S/L  3# abd/ER/flex  3 x 15   Elbow flares        Prone flex, ext ,HA,rows 3#  ea  30 ea 3#  ea  30 ea 3#  4x10 ea  40 ea 3#  4x10 ea  40 ea B 3#  3x15 ea   B   MTPs/LTPs [place] 10#  40 ea [place] 10#  40 ea [place] 12#  3x15 ea [place] 12#  3x15 ea [place] 12#  3x15 ea   IR/ER [place]  3#  40 ea [place]  3#  40 ea [place]  3# 45x ea [place]    ER 5#  45  IR  8#  39 [place]    ER 5#  39  IR  8#  39   D1,D2 flex/ext L 1# 1x10 ea L 1# 2x10 ea L 2# 1x20 ea B  2# 20 ea B  2# 20 ea   Wall push-offs                Ther Activity                        Gait Training                        Modalities 3 19 21 3 24 21 3 26 21  4 2 21   HP to L shld Will apply @ home, prn    Will con't to use Home TENS *Will apply @ home, prn    Will con't to use Home TENS     *Will apply @ home, prn    Will con't to use Home TENS  *Will apply @ home, prn    Will con't to use Home TENS   CP Will apply @ home, prn *Will apply @ home, prn *Will apply @ home, prn

## 2021-04-05 ENCOUNTER — OFFICE VISIT (OUTPATIENT)
Dept: PHYSICAL THERAPY | Facility: CLINIC | Age: 56
End: 2021-04-05
Payer: OTHER MISCELLANEOUS

## 2021-04-05 DIAGNOSIS — S46.012D TRAUMATIC TEAR OF LEFT ROTATOR CUFF, SUBSEQUENT ENCOUNTER: Primary | ICD-10-CM

## 2021-04-05 PROCEDURE — 97140 MANUAL THERAPY 1/> REGIONS: CPT

## 2021-04-05 PROCEDURE — 97110 THERAPEUTIC EXERCISES: CPT

## 2021-04-05 NOTE — PROGRESS NOTES
Daily Note     Today's date: 2021  Patient name: Kelly Witt  : 1965  MRN: [id number]  Referring provider: Luis Wellington MD  Dx:   Encounter Diagnosis     ICD-10-CM    1  Traumatic tear of left rotator cuff, subsequent encounter  S46 012D                   Subjective:  No new complaints voiced today  Objective: See treatment diary below      Assessment: Tolerated treatment Fair+ to Fairly Well today, as pt  is tired from long weekend, and work  Plan: Con't services 2x/week  Precautions: PROM x4 weeks, add AAROM at week 4, AROM week 10, must wear sling x 4 weeks    Surgery date : 20  Re-eval Date: 21    Date 4 5 21 3 24 21 3 26 21 3/29 4 2 21   Visit Count 36 32 33 34 35   FOTO    due    Pain In 0/10 L shoulder  3/10 R shoulder   2-3/10 B shoulders L 0/10  R 3-4/10  L 0/10  R 3/10 L 0/10  R 3-4/10   Pain Out 0/10 L shoulder  R shoulder~  same 0/10 0/10 L shoulder  R shld - same  0/10 B shld 0/10 L shld  R shld - same     Manuals 4 5 21 3 24 21 3 26 21 3/29 4 2 21   PROM L shld 10' 10 min 10 min declined 10 min                           Neuro Re-Ed                                                                Ther Ex 4 5 21 3 24 21 3 26 21  3/29 4 2 21   AROM L wrist        AROM L elbow Palm up/down thumb up 5# 3x15 ea  *HEP* Palm up/down thumb up 5# 45x ea Palm up/down thumb up 5# 3x15 ea  Brian  Palm up/down thumb up 5# 45x ea Palm up/down thumb up 5# 3x15 ea            Pendulum ex        Pulleys  Flex/scap  D/C       Cane AAROM        tableslides HEP Reviewed for HEP *HEP     isometrics        shrugs  rolls        Doorway stretch        scap retraction        UBE 70  Rpm  10' alt 70 rpm  10' alt 70 rpm  10' alt 70  Rpm  10' alt 70  Rpm  10' alt   Active flex/scap to  End range HEP 10x  15x     S/L abd /ER  S/L  3# abd/ER/flex  3 x 15 20 x ea  2#  Added flex 20 x ea  2#  Added flex S/L  3# abd/ER/flex  2 x 15 S/L  3# abd/ER/flex  3 x 15   Elbow flares        Prone flex, ext ,HA,rows 3# B  3x15ea 3#  ea  30 ea 3#  4x10 ea  40 ea 3#  4x10 ea  40 ea B 3#  3x15 ea   B   MTPs/LTPs [place] 12#  3x15ea [place] 10#  40 ea [place] 12#  3x15 ea [place] 12#  3x15 ea [place] 12#  3x15 ea   IR/ER [place]    ER 5#  45  IR  8#  39 [place]  3#  40 ea Gini  3# 45x ea [place]    ER 5#  39  IR  8#  39 [place]    ER 5#  39  IR  8#  39   D1,D2 flex/ext B  2# 20 ea L 1# 2x10 ea L 2# 1x20 ea B  2# 20 ea B  2# 20 ea   Wall push-offs                Ther Activity                        Gait Training                        Modalities 4 5 21 3 24 21 3 26 21  4 2 21   HP to L shld Will apply @ home, prn    Will con't to use Home TENS *Will apply @ home, prn    Will con't to use Home TENS     *Will apply @ home, prn    Will con't to use Home TENS  *Will apply @ home, prn    Will con't to use Home TENS   CP Will apply @ home, prn *Will apply @ home, prn *Will apply @ home, prn

## 2021-04-07 ENCOUNTER — OFFICE VISIT (OUTPATIENT)
Dept: PHYSICAL THERAPY | Facility: CLINIC | Age: 56
End: 2021-04-07
Payer: OTHER MISCELLANEOUS

## 2021-04-07 DIAGNOSIS — S46.012D TRAUMATIC TEAR OF LEFT ROTATOR CUFF, SUBSEQUENT ENCOUNTER: Primary | ICD-10-CM

## 2021-04-07 PROCEDURE — 97110 THERAPEUTIC EXERCISES: CPT

## 2021-04-07 NOTE — PROGRESS NOTES
Daily Note     Today's date: 2021  Patient name: Brian Gardner  : 1965  MRN: [id number]  Referring provider: Lianna Long MD  Dx:   Encounter Diagnosis     ICD-10-CM    1  Traumatic tear of left rotator cuff, subsequent encounter  S46 012D                   Subjective: Pt notes no pain L shld and R shld is achy      Objective: See treatment diary below      Assessment: Tolerated treatment well  Patient demonstrated fatigue post treatment, exhibited good technique with therapeutic exercises and would benefit from continued PT  The R shld remains more bothersome than the L  Plan: Continue per plan of care  Precautions: PROM x4 weeks, add AAROM at week 4, AROM week 10, must wear sling x 4 weeks    Surgery date : 20  Re-eval Date:     Date       Visit Count 36 37      FOTO        Pain In 0/10 L shoulder  3/10 R shoulder         Pain Out 0/10 L shoulder  R shoulder~  same 0/10 L  achey R        Manuals       PROM L shld 10' NP                              Neuro Re-Ed                                                                Ther Ex 4       AROM L wrist        AROM L elbow Palm up/down thumb up 5# 3x15 ea  *HEP* Palm up/down thumb up 5# 45x ea               Pendulum ex        Pulleys  Flex/scap  D/C       Cane AAROM        tableslides HEP  HEP      isometrics        shrugs  rolls        Doorway stretch        scap retraction        UBE 70  Rpm  10' alt 70 rpm  10' alt      Active flex/scap to  End range HEP       S/L abd /ER  S/L  3# abd/ER/flex  3 x 15    S/L   3#  Abd/ER/flex  3 x 15      Elbow flares        Prone flex, ext ,HA,rows 3# B  3x15ea 3#  ea  3 x 15 ea      MTPs/LTPs [place] 12#  3x15ea Gini 12#  39 ea      IR/ER Gini    ER 5#  39  IR  8#  39 [place]   ER  6#   45  IR      D1,D2 flex/ext B  2# 20 ea L 1# 2x10 ea      Wall push-offs                Ther Activity                        Gait Training                        Modalities 4  4/7/21      HP to L shld Will apply @ home, prn    Will con't to use Home TENS *Will apply @ home, prn    Will con't to use Home TENS          CP Will apply @ home, prn *Will apply @ home, prn

## 2021-04-11 NOTE — PROGRESS NOTES
PT Re-Evaluation  and PT Discharge    Today's date: 21  Patient name: Radha Menon  : 1965  MRN: [id number]  Referring provider: Anette Cross MD  Dx:   Encounter Diagnosis     ICD-10-CM    1  Traumatic tear of left rotator cuff, subsequent encounter  [id number]                   Assessment  Assessment details: Radha Menon is a 63 y o  female presenting to outpatient physical therapy with diagnosis of Traumatic tear of left rotator cuff, subsequent encounter  (primary encounter diagnosis)Pt underwent L rot cuff repair surgery on 20  [place] Patient's current impairments includeL shld  pain, impaired soft tissue mobility, reduced L shld  range of motion, reduced LUE  strength, reduced postural awareness, and reduced activity tolerance  Patient's present functional limitations include difficulty with ADLs with increased need for assistance, reliance on medication and/or modalities for pain relief, poor tolerance for functional mobility and activity, and difficulty completing work/HH  responsibilities  Patient to benefit from skilled outpatient physical therapy 2x/week for 12 weeks in order to reduce pain, maximize pain free range of motion, increase strength and stability, and improve functional mobility/functional activity in order to maximize return to prior level of function with reduced limitations  Thank you for your referral   20 UPDATE: Pt has had 7 OPPT visits to date  Pt has shown good improvement in PROM L shld with increased strength of L elbow, wrist and   Pain level usually 2-3/10 described as achiness  Pt notes improved functional ability with light tasks as seen by improved FOTO score  Pt has begun AAROM ex at week 4 and sling DC'd as of today  Pt will cont to benefit from skilled PT, pt to begin AROM at week 10 post op     20 UPDATE:  Pt has had 15 OPPT visits to date consisting of AAROM and PROM ex R shld   Pt recently returned to work light duty and has had increased pain ever since  Pain described as constant ache rated 5/10  States she had to resume use of naproxen and occ tramadol due to increase in symptoms  Pt has made good gains in PROM as seen in objective data  Pt to begin AROM ex next visit  Pt will benefit from skilled PT to promote increased AROM and strength R shld  Ptis worried about injuring RUE at work due to responsibilities placed on her despite work restrictions  1/14/21 UPDATE: Radha Aguilar has had 25 OPPT visits to date with continued progress in AROM/PROM and strength as seen in objective data  Pt's FOTO score cont to improve indicating cont'd functional gains  Pt has no pain, just occ pinch or ache in the L shld  Pt has progressed to active and strengthening ex and will cont to benefit from skilled PT to advance toward goals  4/12/21 UPDATE:  This pt has had 45 OPPT visits to date for L shld rot cuff repair and weakness/pain R shld  Pt notes she feels 95% improved from [facility] with the last 5% remaining being strength L shldr  States she feels ready for DC from PT and plans to join a local gym for cont'd strengthening  States she is able to perform most job duties but is insecure ambulating pts and turning larger pts  Has resumed all usual [employer] chores  Impairments: abnormal or restricted ROM, activity intolerance, impaired physical strength, lacks appropriate home exercise program and pain with function  Barriers to therapy: obesity  Understanding of Dx/Px/POC: good   Prognosis: good    Goals  STGs to be achieved in 4 weeks:  1  Pt to demonstrate reduced subjective pain rating "at worst" by at least 2-3 points from Initial Eval in order to allow for reduced pain with ADLs and improved functional activity tolerance  NOT MET  2  Pt to demonstrate increased PROM of L shld by at least 10-20 degrees in order to allow for greater ease and independence with ADLs and functional mobility  MET and ONGOING  3   Pt to demonstrate full PROM of L shld in order to maximize joint mobility and function and allow for progression of exercise program and achievement of goals  MET  4  Pt to demonstrate increased MMT of L shld  by at least 1/2-1 grade in order to improve safety and stability with ADLs and functional mobility  NEW GOAL - PARTIALLY MET    LTGs to be achieved in 6-8 weeks:  1  Pt will be I with HEP in order to continue to improve quality of life and independence and reduce risk for re-injury  MET   2  Pt to demonstrate return to work without limitations or restrictions  MET  3  Pt to demonstrate improved function as noted by achieving or exceeding predicted score on FOTO outcomes assessment tool  PROGRESSING      Plan  Plan details: Cont to progress strengthening ex        Subjective Evaluation    History of Present Illness  Mechanism of injury: Back in FEB,pt was moving a large patient and felt a pop in her L shld  Pt had MRI L shld and referred to PT  Pt had several months of OT and L shld was doing well  Pt then fell in mid-Aug  And pt ust and pain in L shld escalated and pt made decision to undergo surgery for L rot cuff repair 9/23/20  Pt is wearing sling and mindy  Pt attended first f/u visit with Dr Jonathan Sanchez w/o sling   Pt states she was instr to wear sling x 4 weeks by the doctor  Pt states she did not actively use the L arm while sling was off but admits to washing dishes and doing laundry  Pt states  She has diff putting hair in a ponytail,unable to don bra, diff donning pants, unable to lift, diff hanging laundry on clothesline  Pt has resumed driving  11/2 UPDATE:  Pt states she only has a dull ache in the shld for the majority of the time  States she has not been able to use the arm and pain may increase with use  Pt notes improved strength of hand and elbow  Notes pinching sensation in clavicular area with bicep curls  DC'd sling as of today  12/2 UPDATE: Pt has increased pain in L shldr since return to work   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  States her motion is good and she just needs to keep working on strengthening  States she has no pain, just aches with weather or activity  Pt has most diff with overhead tasks  States she gets frustrated at lack of strength, dory when taking items out of cupboard  Drops item if it is too heavy  Pt has ret'd to work which she is handling better now   UPDATE:  Pt notes she feels 95% improved from [facility] with the last 5% remaining being strength L shldr  States she feels ready for DC from PT and plans to join a local gym for cont'd strengthening  States she is able to perform most job duties but is insecure ambulating pts and turning larger pts  Not a recurrent problem   Quality of life: good    Pain  Current pain ratin  At best pain ratin  At worst pain ratin (overhead activity)  Location: L shld  Quality: dull ache, discomfort and tight (heavy)  Relieving factors: medications, support and rest  Exacerbated by: mvmt, cold weather    Progression: improved    Social Support  Steps to enter house: yes (7)  Stairs in house: no   Lives in: multiple-level home (stays on 1st floor)  Lives with: adult children and spouse    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, 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)  Hand dominance: right      Diagnostic Tests  MRI studies: abnormal  Treatments  Previous treatment: physical therapy, medication and injection treatment  Current treatment: medication and physical therapy  Patient Goals  Patient goals for therapy: decreased pain, increased motion, increased strength, independence with ADLs/IADLs and return to work          Objective     Postural Observations  Seated posture: good  Standing posture: good        Observations   Left Shoulder   Positive for incision (well healed porthole sites  )  Additional Observation Details  Sling and swathe L UE DC'w[place]ley 11/2 4/12 Incision site well healed w/no s/s of infection    Palpation     Additional Palpation Details  TTP ant L shld and bicep tendon insertion    Cervical/Thoracic Screen   Cervical range of motion within normal limits  Cervical range of motion within normal limits with the following exceptions: Pt has begun to experience B UT pain and spasms since RTW      Neurological Testing     Sensation     Shoulder   Left Shoulder   Intact: light touch, hot/cold discrimination and proprioception    Active Range of Motion   Left Shoulder   Flexion: 180 degrees   Extension: 55 degrees   Abduction: 180 degrees   External rotation 90°: 71 degrees   Internal rotation 90°: 84 degrees     Right Shoulder   Flexion: 180 degrees with pain  Extension: 65 degrees   Abduction: 180 degrees with pain  External rotation 90°: 70 degrees  Internal rotation 90°: 69 degrees     Additional Active Range of Motion Details  No AROM measurements taken as pt to begin AROM  Next session as per protocol    Passive Range of Motion   Left Shoulder   Flexion: 180 degrees   Abduction: 180 degrees   External rotation 45°: 60 degrees   Internal rotation 45°: 80 degrees     Right Shoulder   Flexion: 180 degrees   Abduction: 180 degrees     Additional Passive Range of Motion Details  No active mvmt until week 10 as per phys recs    Strength/Myotome Testing     Left Shoulder     Planes of Motion   Flexion: 3+   Extension: 4+   Abduction: 3+   External rotation at 0°: 5   Internal rotation at 0°: 5     Isolated Muscles   Biceps: 5     Right Shoulder   Normal muscle strength    Planes of Motion   Flexion: 3+   Extension: 4+   Abduction: 3   External rotation at 0°: 5   Internal rotation at 0°: 5     Isolated Muscles   Biceps: 5     Left Elbow   Flexion: 4-  Extension: 4             Precautions: PROM x4 weeks, add AAROM at week 4, AROM week 10, must wear sling x 4 weeks    Surgery date : 9/23/20  Re-eval Date:   Date 4 5 21 4/7 4/12     Visit Count 36 37 45     FOTO   due     Pain In 0/10 L shoulder  3/10 R shoulder         Pain Out 0/10 L shoulder  R shoulder~  same 0/10 L  achey R        Manuals 4 5 21 4/7 4/12     PROM L shld 10' NP      ROM/strength check   10'                     Neuro Re-Ed                                                                Ther Ex 4 5 21 4/7 4/12     AROM L wrist        AROM L elbow Palm up/down thumb up 5# 3x15 ea  *HEP* Palm up/down thumb up 5# 45x ea               Pendulum ex        Pulleys  Flex/scap  D/C       Cane AAROM        tableslides HEP  HEP      isometrics        shrugs  rolls        Doorway stretch        scap retraction        UBE 70  Rpm  10' alt 70 rpm  10' alt      Active flex/scap to  End range HEP       S/L abd /ER  S/L  3# abd/ER/flex  3 x 15    S/L   3#  Abd/ER/flex  3 x 15    S/L   3#  Abd/ER/flex  3 x 15        Elbow flares        Prone flex, ext ,HA,rows 3# B  3x15ea 3#  ea  3 x 15 ea 3#  ea  3 x 15 ea     MTPs/LTPs Gini 12#  3x15ea Gini 12#  45 ea      IR/ER [place]    ER 5#  45  IR  8#  39 [place]   ER  6#   45  IR      D1,D2 flex/ext B  2# 20 ea L 1# 2x10 ea      Wall push-offs                Ther Activity                        Gait Training                        Modalities 4 5 21 4/7/21      HP to L shld Will apply @ home, prn    Will con't to use Home TENS *Will apply @ home, prn    Will con't to use Home TENS          CP Will apply @ home, prn *Will apply @ home, prn

## 2021-04-12 ENCOUNTER — EVALUATION (OUTPATIENT)
Dept: PHYSICAL THERAPY | Facility: CLINIC | Age: 56
End: 2021-04-12
Payer: OTHER MISCELLANEOUS

## 2021-04-12 DIAGNOSIS — S46.012D TRAUMATIC TEAR OF LEFT ROTATOR CUFF, SUBSEQUENT ENCOUNTER: Primary | ICD-10-CM

## 2021-04-12 PROCEDURE — 97140 MANUAL THERAPY 1/> REGIONS: CPT

## 2021-04-12 PROCEDURE — 97110 THERAPEUTIC EXERCISES: CPT

## 2021-04-15 ENCOUNTER — APPOINTMENT (OUTPATIENT)
Dept: PHYSICAL THERAPY | Facility: CLINIC | Age: 56
End: 2021-04-15
Payer: OTHER MISCELLANEOUS

## 2021-04-19 ENCOUNTER — APPOINTMENT (OUTPATIENT)
Dept: PHYSICAL THERAPY | Facility: CLINIC | Age: 56
End: 2021-04-19
Payer: OTHER MISCELLANEOUS

## 2021-04-21 ENCOUNTER — APPOINTMENT (OUTPATIENT)
Dept: PHYSICAL THERAPY | Facility: CLINIC | Age: 56
End: 2021-04-21
Payer: OTHER MISCELLANEOUS

## 2021-04-26 ENCOUNTER — APPOINTMENT (OUTPATIENT)
Dept: PHYSICAL THERAPY | Facility: CLINIC | Age: 56
End: 2021-04-26
Payer: OTHER MISCELLANEOUS

## 2021-04-29 ENCOUNTER — APPOINTMENT (OUTPATIENT)
Dept: PHYSICAL THERAPY | Facility: CLINIC | Age: 56
End: 2021-04-29
Payer: OTHER MISCELLANEOUS

## 2021-05-25 ENCOUNTER — OFFICE VISIT (OUTPATIENT)
Dept: OBGYN CLINIC | Facility: CLINIC | Age: 56
End: 2021-05-25
Payer: OTHER MISCELLANEOUS

## 2021-05-25 VITALS — HEIGHT: 67 IN | BODY MASS INDEX: 45.99 KG/M2 | WEIGHT: 293 LBS

## 2021-05-25 DIAGNOSIS — M12.811 ROTATOR CUFF TEAR ARTHROPATHY OF RIGHT SHOULDER: ICD-10-CM

## 2021-05-25 DIAGNOSIS — S46.012D TRAUMATIC COMPLETE TEAR OF LEFT ROTATOR CUFF, SUBSEQUENT ENCOUNTER: Primary | ICD-10-CM

## 2021-05-25 DIAGNOSIS — Z12.11 ENCOUNTER FOR SCREENING COLONOSCOPY: ICD-10-CM

## 2021-05-25 DIAGNOSIS — M75.101 ROTATOR CUFF TEAR ARTHROPATHY OF RIGHT SHOULDER: ICD-10-CM

## 2021-05-25 PROCEDURE — 99213 OFFICE O/P EST LOW 20 MIN: CPT | Performed by: ORTHOPAEDIC SURGERY

## 2021-05-25 NOTE — LETTER
May 25, 2021     Patient: Kelly Witt   YOB: 1965   Date of Visit: 5/25/2021       To Whom it May Concern:    Kelly Witt is under my professional care  She was seen in my office on 5/25/2021  She may return to work on 5/25/21  She should not ambuate patients, no overhead lifting  If you have any questions or concerns, please don't hesitate to call           Sincerely,          Shorty Garcia MD        CC: No Recipients

## 2021-05-25 NOTE — PROGRESS NOTES
Assessment:     1  Traumatic complete tear of left rotator cuff, subsequent encounter    2  Encounter for screening colonoscopy          Plan:     Problem List Items Addressed This Visit        Musculoskeletal and Integument    Traumatic complete tear of left rotator cuff - Primary      Other Visit Diagnoses     Encounter for screening colonoscopy        Relevant Orders    Ambulatory referral for colon cancer education           63 y o  female aprox  S/p left rotator cuff repair and biceps tenotomy, DOS 09/23/2020  Patient continues to show signs of a massive right rotator tear highly concerning for rotator cuff tear arthropathy  The cortisone injection did not give her good relief  At this point, the shoulder, which initially had a work injury also seems to be very aggravated by her recovery from left shoulder surgery  I would like to get an MRI of the right shoulder to better assess the status of the rotator cuff and the rotator muscles  She will follow up with me after the MRI  A work note for no overhead lifting and no walking with patient's was given today  Patient ID: Geofm Geeta is a 63 y o  female  Chief Complaint:  Left shoulder post op     HPI:    62-year-old female status post a left shoulder rotator cuff tear repair  She is doing very well from this  Unfortunately, during the course of her recovery, as she has been using her right arm much more it has been significantly aggravated  Her last visit based on radiographs and physical exam findings I felt that she likely has a rotator cuff tear arthropathy  She was given a cortisone injection but has not had any significant improvement in that shoulder  She has a lot of difficulty with any over the head work or any work away from her body  She does note that she had a fall at work prior and was doing well with that shoulder until she has had to use it much more during recovery from left shoulder          Allergy:  No Known Allergies    Medications:  all current active meds have been reviewed    Past Medical History:  Past Medical History:   Diagnosis Date    Arthritis     Cancer [facility] [address] [facility]     endometrial    Endometrial adenocarcinoma ([facility] [address] [facility]     Endometriosis     Facial twitching     06jun2016 resolved    Migraine     Obesity     Thyroid disease        Past Surgical History:  Past Surgical History:   Procedure Laterality Date    ANKLE FRACTURE SURGERY Left     ANKLE SURGERY      APPENDECTOMY      CHOLECYSTECTOMY      FRACTURE SURGERY Left     ankle    HYSTERECTOMY      HYSTEROSCOPY      KNEE ARTHROSCOPY Left     OOPHORECTOMY      ME KNEE SCOPE,MED/LAT MENISECTOMY Left 6/3/2019    Procedure: KNEE ARTHROSOCPY; partial medial meniscectomy and debridement, synovectomy, chondroplasty;  Surgeon: Vickey Trejo MD;  Location: [facility];  Service: Orthopedics    ME SHLDR ARTHROSCOP,SURG,W/ROTAT CUFF REPR Left 9/23/2020    Procedure: REPAIR ROTATOR CUFF  ARTHROSCOPIC with biceps tenotomy;  Surgeon: Mandy Diego MD;  Location: [facility];  Service: Orthopedics       Family History:  Family History   Problem Relation Age of Onset    Clotting disorder Sister         possible, history of multiple blood clots    Diabetes Father     Hypertension Father     Cancer Father         bladder cancer    Hypotension Family     No Known Problems Maternal Grandfather     Liver cancer Paternal Grandmother     Colon cancer Paternal Grandmother     No Known Problems Daughter     No Known Problems Maternal Grandmother     No Known Problems Daughter     Breast cancer additional onset Maternal Aunt     Bone cancer Maternal Aunt     No Known Problems Paternal Aunt        Social History:  Social History     Substance and Sexual Activity   Alcohol Use Yes    Comment: OCASSIONAL     Social History     Substance and Sexual Activity   Drug Use No     Social History     Tobacco Use   Smoking Status Never Smoker   Smokeless Tobacco Never Used Tobacco Comment    former smoker in all scripts           ROS:  Review of Systems   Musculoskeletal: Positive for arthralgias  All other systems reviewed and are negative  Objective:  BP Readings from Last 1 Encounters:   03/29/21 134/82      Wt Readings from Last 1 Encounters:   05/25/21 134 kg (296 lb)        BMI:   Estimated body mass index is 46 36 kg/m² as calculated from the following:    Height as of this encounter: 5' 7" (1 702 m)  Weight as of this encounter: 134 kg (296 lb)  EXAM:   Physical Exam  Right Shoulder Exam     Tenderness   The patient is experiencing no tenderness  Range of Motion   The patient has normal right shoulder ROM  Muscle Strength   External rotation: 4/5   Supraspinatus: 4/5   Subscapularis: 4/5     Tests   Lincoln test: positive  Impingement: positive    Comments:    Crepitus with shoulder range of motion      Left Shoulder Exam     Tenderness   The patient is experiencing no tenderness  Range of Motion   External rotation: normal   Forward flexion: normal   Internal rotation 0 degrees: normal     Other   Erythema: absent  Scars: present  Sensation: normal  Pulse: present             Radiographs:    X-rays of the right shoulder were taken today and were reviewed by myself  No new radiographs today

## 2021-07-13 ENCOUNTER — VBI (OUTPATIENT)
Dept: ADMINISTRATIVE | Facility: OTHER | Age: 56
End: 2021-07-13

## 2021-07-23 ENCOUNTER — RA CDI HCC (OUTPATIENT)
Dept: OTHER | Facility: HOSPITAL | Age: 56
End: 2021-07-23

## 2021-07-28 ENCOUNTER — APPOINTMENT (OUTPATIENT)
Dept: LAB | Facility: CLINIC | Age: 56
End: 2021-07-28
Payer: COMMERCIAL

## 2021-07-28 DIAGNOSIS — F41.9 ANXIETY: ICD-10-CM

## 2021-07-28 DIAGNOSIS — E55.9 VITAMIN D DEFICIENCY: ICD-10-CM

## 2021-07-28 DIAGNOSIS — M15.9 PRIMARY OSTEOARTHRITIS INVOLVING MULTIPLE JOINTS: ICD-10-CM

## 2021-07-28 DIAGNOSIS — E03.9 HYPOTHYROIDISM, UNSPECIFIED TYPE: ICD-10-CM

## 2021-07-28 DIAGNOSIS — I48.0 PAROXYSMAL ATRIAL FIBRILLATION (HCC): ICD-10-CM

## 2021-07-28 LAB
25(OH)D3 SERPL-MCNC: 36.9 NG/ML (ref 30–100)
ALBUMIN SERPL BCP-MCNC: 3.5 G/DL (ref 3.5–5)
ALP SERPL-CCNC: 136 U/L (ref 46–116)
ALT SERPL W P-5'-P-CCNC: 30 U/L (ref 12–78)
ANION GAP SERPL CALCULATED.3IONS-SCNC: 5 MMOL/L (ref 4–13)
AST SERPL W P-5'-P-CCNC: 14 U/L (ref 5–45)
BASOPHILS # BLD AUTO: 0.03 THOUSANDS/ΜL (ref 0–0.1)
BASOPHILS NFR BLD AUTO: 0 % (ref 0–1)
BILIRUB SERPL-MCNC: 0.5 MG/DL (ref 0.2–1)
BUN SERPL-MCNC: 21 MG/DL (ref 5–25)
CALCIUM SERPL-MCNC: 9.9 MG/DL (ref 8.3–10.1)
CHLORIDE SERPL-SCNC: 103 MMOL/L (ref 100–108)
CHOLEST SERPL-MCNC: 236 MG/DL (ref 50–200)
CO2 SERPL-SCNC: 27 MMOL/L (ref 21–32)
CREAT SERPL-MCNC: 0.68 MG/DL (ref 0.6–1.3)
EOSINOPHIL # BLD AUTO: 0.11 THOUSAND/ΜL (ref 0–0.61)
EOSINOPHIL NFR BLD AUTO: 2 % (ref 0–6)
ERYTHROCYTE [DISTWIDTH] IN BLOOD BY AUTOMATED COUNT: 14.1 % (ref 11.6–15.1)
GFR SERPL CREATININE-BSD FRML MDRD: 97 ML/MIN/1.73SQ M
GLUCOSE P FAST SERPL-MCNC: 93 MG/DL (ref 65–99)
HCT VFR BLD AUTO: 45.7 % (ref 34.8–46.1)
HDLC SERPL-MCNC: 53 MG/DL
HGB BLD-MCNC: 14.5 G/DL (ref 11.5–15.4)
IMM GRANULOCYTES # BLD AUTO: 0.03 THOUSAND/UL (ref 0–0.2)
IMM GRANULOCYTES NFR BLD AUTO: 0 % (ref 0–2)
LDLC SERPL CALC-MCNC: 157 MG/DL (ref 0–100)
LYMPHOCYTES # BLD AUTO: 1.84 THOUSANDS/ΜL (ref 0.6–4.47)
LYMPHOCYTES NFR BLD AUTO: 25 % (ref 14–44)
MCH RBC QN AUTO: 28.9 PG (ref 26.8–34.3)
MCHC RBC AUTO-ENTMCNC: 31.7 G/DL (ref 31.4–37.4)
MCV RBC AUTO: 91 FL (ref 82–98)
MONOCYTES # BLD AUTO: 0.59 THOUSAND/ΜL (ref 0.17–1.22)
MONOCYTES NFR BLD AUTO: 8 % (ref 4–12)
NEUTROPHILS # BLD AUTO: 4.92 THOUSANDS/ΜL (ref 1.85–7.62)
NEUTS SEG NFR BLD AUTO: 65 % (ref 43–75)
NONHDLC SERPL-MCNC: 183 MG/DL
NRBC BLD AUTO-RTO: 0 /100 WBCS
PLATELET # BLD AUTO: 239 THOUSANDS/UL (ref 149–390)
PMV BLD AUTO: 13 FL (ref 8.9–12.7)
POTASSIUM SERPL-SCNC: 4.4 MMOL/L (ref 3.5–5.3)
PROT SERPL-MCNC: 8.1 G/DL (ref 6.4–8.2)
RBC # BLD AUTO: 5.01 MILLION/UL (ref 3.81–5.12)
SODIUM SERPL-SCNC: 135 MMOL/L (ref 136–145)
TRIGL SERPL-MCNC: 132 MG/DL
TSH SERPL DL<=0.05 MIU/L-ACNC: 3.62 UIU/ML (ref 0.36–3.74)
WBC # BLD AUTO: 7.52 THOUSAND/UL (ref 4.31–10.16)

## 2021-07-28 PROCEDURE — 82306 VITAMIN D 25 HYDROXY: CPT

## 2021-07-28 PROCEDURE — 80061 LIPID PANEL: CPT

## 2021-07-28 PROCEDURE — 36415 COLL VENOUS BLD VENIPUNCTURE: CPT

## 2021-07-28 PROCEDURE — 84443 ASSAY THYROID STIM HORMONE: CPT

## 2021-07-28 PROCEDURE — 85025 COMPLETE CBC W/AUTO DIFF WBC: CPT

## 2021-07-28 PROCEDURE — 80053 COMPREHEN METABOLIC PANEL: CPT

## 2021-07-29 ENCOUNTER — OFFICE VISIT (OUTPATIENT)
Dept: INTERNAL MEDICINE CLINIC | Facility: CLINIC | Age: 56
End: 2021-07-29
Payer: COMMERCIAL

## 2021-07-29 ENCOUNTER — TELEPHONE (OUTPATIENT)
Dept: ADMINISTRATIVE | Facility: OTHER | Age: 56
End: 2021-07-29

## 2021-07-29 VITALS
BODY MASS INDEX: 45.89 KG/M2 | TEMPERATURE: 98.6 F | HEIGHT: 67 IN | WEIGHT: 292.4 LBS | SYSTOLIC BLOOD PRESSURE: 140 MMHG | HEART RATE: 61 BPM | DIASTOLIC BLOOD PRESSURE: 80 MMHG | OXYGEN SATURATION: 96 %

## 2021-07-29 DIAGNOSIS — E55.9 VITAMIN D DEFICIENCY: ICD-10-CM

## 2021-07-29 DIAGNOSIS — E78.2 MIXED HYPERLIPIDEMIA: ICD-10-CM

## 2021-07-29 DIAGNOSIS — M12.811 ROTATOR CUFF ARTHROPATHY OF RIGHT SHOULDER: ICD-10-CM

## 2021-07-29 DIAGNOSIS — M15.9 PRIMARY OSTEOARTHRITIS INVOLVING MULTIPLE JOINTS: Primary | ICD-10-CM

## 2021-07-29 PROCEDURE — 1036F TOBACCO NON-USER: CPT | Performed by: FAMILY MEDICINE

## 2021-07-29 PROCEDURE — 3008F BODY MASS INDEX DOCD: CPT | Performed by: FAMILY MEDICINE

## 2021-07-29 PROCEDURE — 99213 OFFICE O/P EST LOW 20 MIN: CPT | Performed by: FAMILY MEDICINE

## 2021-07-29 NOTE — TELEPHONE ENCOUNTER
----- Message from Darrion Brown sent at 7/29/2021 11:26 AM EDT -----  07/29/21 11:27 AM    Hello, our patient Sandra Dallas has had CRC: Colonoscopy completed/performed  Please assist in updating the patient chart by making an External outreach to [facility] located in [phone]  The date of service is WITHIN LAST 10 YEARS      Thank you,  Mackenzie Parker MA   PRIMARY CARE [place] [address]

## 2021-07-29 NOTE — LETTER
Procedure Request Form: Colonoscopy      Date Requested: 21  Patient: Barbar Lemmings  Patient : 1965   Referring Provider: Nidia Aase, MD        Date of Procedure ______________________________       The above patient has informed us that they have completed their   most recent Colonoscopy at your facility  Please complete   this form and attach all corresponding procedure reports/results  Comments __________________________________________________________  ____________________________________________________________________  ____________________________________________________________________  ____________________________________________________________________    Facility Completing Procedure _________________________________________    Form Completed By (print name) _______________________________________      Signature __________________________________________________________      These reports are needed for  compliance    Please fax this completed form and a copy of the procedure report to our office located at [place] [address] as soon as possible to [phone] brett Barrera: Phone [phone]    We thank you for your assistance in treating our mutual patient

## 2021-07-29 NOTE — TELEPHONE ENCOUNTER
Upon review of the In Basket request and the patient's chart, initial outreach has been made via fax, please see Contacts section for details       Thank you  Maria Ines Good

## 2021-07-29 NOTE — LETTER
Procedure Request Form: Colonoscopy      Date Requested: 21  Patient: Sandra Dallas  Patient : 1965   Referring Provider: Norman Joel, MD        Date of Procedure ______________________________       The above patient has informed us that they have completed their   most recent Colonoscopy at your facility  Please complete   this form and attach all corresponding procedure reports/results  Comments __________________________________________________________  ____________________________________________________________________  ____________________________________________________________________  ____________________________________________________________________    Facility Completing Procedure _________________________________________    Form Completed By (print name) _______________________________________      Signature __________________________________________________________      These reports are needed for  compliance    Please fax this completed form and a copy of the procedure report to our office located at [place] [address] as soon as possible to [phone] brett Lopez: Phone [phone]    We thank you for your assistance in treating our mutual patient

## 2021-07-30 NOTE — PROGRESS NOTES
Assessment/Plan:    No problem-specific Assessment & Plan notes found for this encounter  Diagnoses and all orders for this visit:    Primary osteoarthritis involving multiple joints    Rotator cuff arthropathy of right shoulder    Vitamin D deficiency  -     Vitamin D 25 hydroxy; Future    Mixed hyperlipidemia  -     Comprehensive metabolic panel; Future  -     Lipid panel; Future  -     TSH, 3rd generation; Future       Orders and recommendations as noted above  Continue follow-up with Orthopedics regarding the shoulder issues  Watch for any worsening  Continue with range-of-motion exercises to prevent stiffening or frozen shoulder  Watch for any worsening of anxiety symptoms  Continue with the lorazepam on as-needed basis but she has not been taking this regularly  Has not been eating healthy and feels this has affected her cholesterol  She is to avoid medications if possible  Watch diet more closely  Increase fiber in diet  Discussed with her that if her LDL remains significantly elevated with the next laboratory testing, statin medication would be recommended  Continue with vitamin-D supplementation  Discussed with her yearly mammograms  Discussed following up with gyn  Coronavirus precautions discussed  Will have her follow up in about 6 months with laboratory testing prior to that visit  Follow up sooner if needed  Subjective:      Patient ID: Marquise Estevez is a 61 y o  female  She presents for routine follow-up  Has generally been doing relatively well  Has been working night shift  Has been on her feet quite a bit  Has some stiffness and swelling especially into the left ankle  Left shoulder has improved significantly after the surgery  Right shoulder with some persistent stiffness and soreness and some limitation in range of motion  Continues to follow-up with Orthopedics  Has been eating more poorly  Admits that she has not been watching her diet as closely    She now has temporary custody of her grandson  This has been a relatively stressful few years related to this  Denies any chest pain or palpitations  Denies any significant shortness of breath  Appetite has been generally good  Denies any nausea, vomiting, or diarrhea  Denies any changes in bowel movements  Has some chronic difficulty with sleeping  Works night shift  The following portions of the patient's history were reviewed and updated as appropriate:   She  has a past medical history of Arthritis, Cancer ([address] ), Endometrial adenocarcinoma ([address] ), Endometriosis, Facial twitching, Migraine, Obesity, and Thyroid disease    She   Patient Active Problem List    Diagnosis Date Noted    Mixed hyperlipidemia 07/29/2021    Vitamin D deficiency 03/29/2021    S/P total abdominal hysterectomy and bilateral salpingo-oophorectomy 03/29/2021    Rotator cuff arthropathy of right shoulder 02/23/2021    Rotator cuff tear arthropathy of right shoulder 02/23/2021    Left ankle pain 10/29/2020    Anxiety 10/29/2020    Grieving 10/29/2020    Osteoarthritis of multiple joints 10/29/2020    Traumatic complete tear of left rotator cuff 06/03/2020    Chronic left shoulder pain 06/03/2020    History of endometrial cancer 06/06/2019    Screening for cardiovascular condition 06/06/2019    Screening for diabetes mellitus 06/06/2019    Visit for screening mammogram 06/06/2019    Need for hepatitis C screening test 06/06/2019    Chronic midline low back pain without sciatica 06/20/2018    Nasal congestion 07/06/2016    Degenerative arthritis of cervical spine 06/06/2016    Lung nodule 06/06/2016    Obesity due to excess calories 05/29/2016    Pulmonary nodule 05/29/2016    Hyperesthesia 07/30/2015    Migraine headache 07/30/2015    Obstructive sleep apnea 07/30/2015    Frequent headaches 10/28/2014    Hypothyroidism 10/28/2014    Paresthesias 10/28/2014    Paroxysmal atrial fibrillation ([address] ) 10/28/2014 She  has a past surgical history that includes Ankle fracture surgery (Left); Hysterectomy; Cholecystectomy; Appendectomy; Ankle surgery; Hysteroscopy; Oophorectomy; pr knee scope,med/lat menisectomy (Left, 6/3/2019); Fracture surgery (Left); Knee arthroscopy (Left); and pr shldr arthroscop,surg,w/rotat cuff repr (Left, 9/23/2020)  Her family history includes Bone cancer in her maternal aunt; Breast cancer additional onset in her maternal aunt; Cancer in her father; Clotting disorder in her sister; Colon cancer in her paternal grandmother; Diabetes in her father; Hypertension in her father; Hypotension in her family; Liver cancer in her paternal grandmother; No Known Problems in her daughter, daughter, maternal grandfather, maternal grandmother, and paternal aunt  She  reports that she has never smoked  She has never used smokeless tobacco  She reports current alcohol use  She reports that she does not use drugs  Current Outpatient Medications   Medication Sig Dispense Refill    diclofenac potassium (CATAFLAM) 50 mg tablet Take 1 tablet (50 mg total) by mouth 2 (two) times a day as needed (pain with food) 60 tablet 1    Multiple Vitamin (MULTIVITAMIN) tablet Take 1 tablet by mouth daily      Vitamin D, Cholecalciferol, 1000 UNITS TABS Take by mouth      albuterol (ProAir HFA) 90 mcg/act inhaler Inhale 2 puffs every 6 (six) hours as needed for wheezing 8 5 g 0    albuterol (PROVENTIL HFA,VENTOLIN HFA) 90 mcg/act inhaler Inhale 2 puffs every 6 (six) hours as needed for wheezing      KRILL OIL PO Take 1 tablet by mouth daily      LORazepam (ATIVAN) 0 5 mg tablet Take 1 tablet (0 5 mg total) by mouth 2 (two) times a day as needed for anxiety (Patient not taking: Reported on 7/29/2021) 60 tablet 1    Omega-3 Fatty Acids (FISH OIL) 1,000 mg Take 1,000 mg by mouth daily  No current facility-administered medications for this visit       Current Outpatient Medications on File Prior to Visit   Medication Sig    diclofenac potassium (CATAFLAM) 50 mg tablet Take 1 tablet (50 mg total) by mouth 2 (two) times a day as needed (pain with food)    Multiple Vitamin (MULTIVITAMIN) tablet Take 1 tablet by mouth daily    Vitamin D, Cholecalciferol, 1000 UNITS TABS Take by mouth    albuterol (ProAir HFA) 90 mcg/act inhaler Inhale 2 puffs every 6 (six) hours as needed for wheezing    albuterol (PROVENTIL HFA,VENTOLIN HFA) 90 mcg/act inhaler Inhale 2 puffs every 6 (six) hours as needed for wheezing    KRILL OIL PO Take 1 tablet by mouth daily    LORazepam (ATIVAN) 0 5 mg tablet Take 1 tablet (0 5 mg total) by mouth 2 (two) times a day as needed for anxiety (Patient not taking: Reported on 7/29/2021)    Omega-3 Fatty Acids (FISH OIL) 1,000 mg Take 1,000 mg by mouth daily  No current facility-administered medications on file prior to visit  She has No Known Allergies       Review of Systems   Constitutional: Positive for fatigue  Negative for activity change, appetite change, chills and fever  HENT: Negative for congestion and rhinorrhea  Eyes: Negative for visual disturbance  Respiratory: Negative for chest tightness and shortness of breath  Cardiovascular: Negative for chest pain and palpitations  Gastrointestinal: Negative for abdominal pain, blood in stool, diarrhea, nausea and vomiting  Endocrine: Negative for polydipsia, polyphagia and polyuria  Genitourinary: Negative for dysuria, frequency and urgency  Musculoskeletal: Positive for arthralgias  Negative for gait problem  Skin: Negative for color change  Neurological: Negative for dizziness and headaches  Hematological: Does not bruise/bleed easily  Psychiatric/Behavioral: Negative for confusion and sleep disturbance  The patient is not nervous/anxious            Objective:      /80 (BP Location: Right arm, Patient Position: Sitting, Cuff Size: Large)   Pulse 61   Temp 98 6 °F (37 °C)   Ht 5' 7" (1 702 m)   Wt 133 kg (292 lb 6 4 oz)   SpO2 96%   BMI 45 80 kg/m²          Physical Exam  Vitals and nursing note reviewed  Constitutional:       General: She is not in acute distress  Appearance: She is well-developed and well-groomed  HENT:      Head: Normocephalic and atraumatic  Eyes:      General:         Right eye: No discharge  Left eye: No discharge  Conjunctiva/sclera: Conjunctivae normal       Pupils: Pupils are equal, round, and reactive to light  Cardiovascular:      Rate and Rhythm: Normal rate and regular rhythm  Heart sounds: Normal heart sounds  No murmur heard  No friction rub  No gallop  Pulmonary:      Effort: No respiratory distress  Breath sounds: No wheezing or rales  Abdominal:      General: Bowel sounds are normal  There is no distension  Tenderness: There is no abdominal tenderness  Musculoskeletal:      Comments:   Somewhat limited range of motion right shoulder area   Lymphadenopathy:      Cervical: No cervical adenopathy  Skin:     General: Skin is warm and dry  Neurological:      Mental Status: She is alert and oriented to person, place, and time  Psychiatric:         Mood and Affect: Mood and affect normal          Speech: Speech normal          Behavior: Behavior normal  Behavior is cooperative

## 2021-08-02 NOTE — TELEPHONE ENCOUNTER
As a follow-up, a second attempt has been made for outreach via fax, please see Contacts section for details       [facility]  - MAIN OFFICE  [phone]    Thank you  Angella Santo

## 2021-08-06 NOTE — TELEPHONE ENCOUNTER
As a final attempt, a third outreach has been made via telephone call  Please see Contacts section for details  This encounter will be closed and completed by end of day  Should we receive the requested information because of previous outreach attempts, the requested patient's chart will be updated appropriately       Thank you  Jeronimo Langford

## 2021-09-09 ENCOUNTER — VBI (OUTPATIENT)
Dept: ADMINISTRATIVE | Facility: OTHER | Age: 56
End: 2021-09-09

## 2021-10-17 ENCOUNTER — OFFICE VISIT (OUTPATIENT)
Dept: URGENT CARE | Facility: CLINIC | Age: 56
End: 2021-10-17
Payer: COMMERCIAL

## 2021-10-17 VITALS
OXYGEN SATURATION: 98 % | SYSTOLIC BLOOD PRESSURE: 144 MMHG | DIASTOLIC BLOOD PRESSURE: 77 MMHG | HEART RATE: 72 BPM | TEMPERATURE: 98 F | WEIGHT: 292 LBS | BODY MASS INDEX: 45.83 KG/M2 | RESPIRATION RATE: 18 BRPM | HEIGHT: 67 IN

## 2021-10-17 DIAGNOSIS — S61.209A AVULSION OF FINGER TIP, INITIAL ENCOUNTER: Primary | ICD-10-CM

## 2021-10-17 PROCEDURE — 99213 OFFICE O/P EST LOW 20 MIN: CPT | Performed by: PHYSICIAN ASSISTANT

## 2021-11-15 ENCOUNTER — VBI (OUTPATIENT)
Dept: ADMINISTRATIVE | Facility: OTHER | Age: 56
End: 2021-11-15

## 2022-01-21 ENCOUNTER — OFFICE VISIT (OUTPATIENT)
Dept: URGENT CARE | Facility: CLINIC | Age: 57
End: 2022-01-21
Payer: COMMERCIAL

## 2022-01-21 ENCOUNTER — APPOINTMENT (EMERGENCY)
Dept: CT IMAGING | Facility: HOSPITAL | Age: 57
DRG: 177 | End: 2022-01-21
Payer: COMMERCIAL

## 2022-01-21 ENCOUNTER — APPOINTMENT (EMERGENCY)
Dept: RADIOLOGY | Facility: HOSPITAL | Age: 57
DRG: 177 | End: 2022-01-21
Payer: COMMERCIAL

## 2022-01-21 ENCOUNTER — HOSPITAL ENCOUNTER (INPATIENT)
Facility: HOSPITAL | Age: 57
LOS: 5 days | Discharge: HOME | DRG: 177 | End: 2022-01-26
Attending: EMERGENCY MEDICINE | Admitting: INTERNAL MEDICINE
Payer: COMMERCIAL

## 2022-01-21 VITALS
OXYGEN SATURATION: 85 % | WEIGHT: 292 LBS | RESPIRATION RATE: 24 BRPM | BODY MASS INDEX: 45.83 KG/M2 | HEIGHT: 67 IN | TEMPERATURE: 98 F | HEART RATE: 104 BPM

## 2022-01-21 DIAGNOSIS — Z12.11 ENCOUNTER FOR SCREENING COLONOSCOPY: ICD-10-CM

## 2022-01-21 DIAGNOSIS — U07.1 PNEUMONIA DUE TO COVID-19 VIRUS: Primary | ICD-10-CM

## 2022-01-21 DIAGNOSIS — R09.02 HYPOXIA: Primary | ICD-10-CM

## 2022-01-21 DIAGNOSIS — J12.82 PNEUMONIA DUE TO COVID-19 VIRUS: Primary | ICD-10-CM

## 2022-01-21 DIAGNOSIS — I26.99 BILATERAL PULMONARY EMBOLISM (HCC): ICD-10-CM

## 2022-01-21 LAB
2HR DELTA HS TROPONIN: -14 NG/L
ALBUMIN SERPL BCP-MCNC: 3.6 G/DL (ref 3.5–5)
ALP SERPL-CCNC: 82 U/L (ref 34–104)
ALT SERPL W P-5'-P-CCNC: 24 U/L (ref 7–52)
ANION GAP SERPL CALCULATED.3IONS-SCNC: 7 MMOL/L (ref 4–13)
APTT PPP: 27 SECONDS (ref 23–37)
AST SERPL W P-5'-P-CCNC: 29 U/L (ref 13–39)
BASOPHILS # BLD AUTO: 0.02 THOUSANDS/ΜL (ref 0–0.1)
BASOPHILS NFR BLD AUTO: 0 % (ref 0–1)
BILIRUB SERPL-MCNC: 0.82 MG/DL (ref 0.2–1)
BUN SERPL-MCNC: 18 MG/DL (ref 5–25)
CALCIUM SERPL-MCNC: 9.1 MG/DL (ref 8.4–10.2)
CARDIAC TROPONIN I PNL SERPL HS: 69 NG/L
CARDIAC TROPONIN I PNL SERPL HS: 83 NG/L
CHLORIDE SERPL-SCNC: 104 MMOL/L (ref 96–108)
CK MB SERPL-MCNC: 1.2 % (ref 0–2.5)
CK MB SERPL-MCNC: 5.1 NG/ML (ref 0.6–6.3)
CK SERPL-CCNC: 440 U/L (ref 26–192)
CO2 SERPL-SCNC: 31 MMOL/L (ref 21–32)
CREAT SERPL-MCNC: 0.75 MG/DL (ref 0.6–1.3)
D DIMER PPP FEU-MCNC: >20 UG/ML FEU
EOSINOPHIL # BLD AUTO: 0.05 THOUSAND/ΜL (ref 0–0.61)
EOSINOPHIL NFR BLD AUTO: 1 % (ref 0–6)
ERYTHROCYTE [DISTWIDTH] IN BLOOD BY AUTOMATED COUNT: 13.8 % (ref 11.6–15.1)
GFR SERPL CREATININE-BSD FRML MDRD: 88 ML/MIN/1.73SQ M
GLUCOSE SERPL-MCNC: 98 MG/DL (ref 65–140)
HCT VFR BLD AUTO: 44 % (ref 34.8–46.1)
HGB BLD-MCNC: 13.8 G/DL (ref 11.5–15.4)
IMM GRANULOCYTES # BLD AUTO: 0.03 THOUSAND/UL (ref 0–0.2)
IMM GRANULOCYTES NFR BLD AUTO: 1 % (ref 0–2)
INR PPP: 1 (ref 0.84–1.19)
LACTATE SERPL-SCNC: 1 MMOL/L (ref 0.5–2)
LYMPHOCYTES # BLD AUTO: 1.28 THOUSANDS/ΜL (ref 0.6–4.47)
LYMPHOCYTES NFR BLD AUTO: 23 % (ref 14–44)
MCH RBC QN AUTO: 28 PG (ref 26.8–34.3)
MCHC RBC AUTO-ENTMCNC: 31.4 G/DL (ref 31.4–37.4)
MCV RBC AUTO: 89 FL (ref 82–98)
MONOCYTES # BLD AUTO: 0.63 THOUSAND/ΜL (ref 0.17–1.22)
MONOCYTES NFR BLD AUTO: 11 % (ref 4–12)
NEUTROPHILS # BLD AUTO: 3.65 THOUSANDS/ΜL (ref 1.85–7.62)
NEUTS SEG NFR BLD AUTO: 64 % (ref 43–75)
NRBC BLD AUTO-RTO: 0 /100 WBCS
PLATELET # BLD AUTO: 214 THOUSANDS/UL (ref 149–390)
PMV BLD AUTO: 10.6 FL (ref 8.9–12.7)
POTASSIUM SERPL-SCNC: 3.6 MMOL/L (ref 3.5–5.3)
PROCALCITONIN SERPL-MCNC: <0.05 NG/ML
PROT SERPL-MCNC: 6.8 G/DL (ref 6.4–8.4)
PROTHROMBIN TIME: 13.1 SECONDS (ref 11.6–14.5)
RBC # BLD AUTO: 4.92 MILLION/UL (ref 3.81–5.12)
SODIUM SERPL-SCNC: 142 MMOL/L (ref 135–147)
WBC # BLD AUTO: 5.66 THOUSAND/UL (ref 4.31–10.16)

## 2022-01-21 PROCEDURE — 80053 COMPREHEN METABOLIC PANEL: CPT | Performed by: PHYSICIAN ASSISTANT

## 2022-01-21 PROCEDURE — 85025 COMPLETE CBC W/AUTO DIFF WBC: CPT | Performed by: PHYSICIAN ASSISTANT

## 2022-01-21 PROCEDURE — 83605 ASSAY OF LACTIC ACID: CPT | Performed by: PHYSICIAN ASSISTANT

## 2022-01-21 PROCEDURE — S9083 URGENT CARE CENTER GLOBAL: HCPCS | Performed by: PHYSICIAN ASSISTANT

## 2022-01-21 PROCEDURE — XW033E5 INTRODUCTION OF REMDESIVIR ANTI-INFECTIVE INTO PERIPHERAL VEIN, PERCUTANEOUS APPROACH, NEW TECHNOLOGY GROUP 5: ICD-10-PCS | Performed by: INTERNAL MEDICINE

## 2022-01-21 PROCEDURE — 85730 THROMBOPLASTIN TIME PARTIAL: CPT | Performed by: PHYSICIAN ASSISTANT

## 2022-01-21 PROCEDURE — 96374 THER/PROPH/DIAG INJ IV PUSH: CPT

## 2022-01-21 PROCEDURE — 83880 ASSAY OF NATRIURETIC PEPTIDE: CPT | Performed by: PHYSICIAN ASSISTANT

## 2022-01-21 PROCEDURE — 84484 ASSAY OF TROPONIN QUANT: CPT | Performed by: PHYSICIAN ASSISTANT

## 2022-01-21 PROCEDURE — 82553 CREATINE MB FRACTION: CPT | Performed by: PHYSICIAN ASSISTANT

## 2022-01-21 PROCEDURE — 85610 PROTHROMBIN TIME: CPT | Performed by: PHYSICIAN ASSISTANT

## 2022-01-21 PROCEDURE — 99285 EMERGENCY DEPT VISIT HI MDM: CPT | Performed by: PHYSICIAN ASSISTANT

## 2022-01-21 PROCEDURE — 71045 X-RAY EXAM CHEST 1 VIEW: CPT

## 2022-01-21 PROCEDURE — 82550 ASSAY OF CK (CPK): CPT | Performed by: PHYSICIAN ASSISTANT

## 2022-01-21 PROCEDURE — 36415 COLL VENOUS BLD VENIPUNCTURE: CPT | Performed by: PHYSICIAN ASSISTANT

## 2022-01-21 PROCEDURE — 99285 EMERGENCY DEPT VISIT HI MDM: CPT

## 2022-01-21 PROCEDURE — 87040 BLOOD CULTURE FOR BACTERIA: CPT | Performed by: PHYSICIAN ASSISTANT

## 2022-01-21 PROCEDURE — 71275 CT ANGIOGRAPHY CHEST: CPT

## 2022-01-21 PROCEDURE — 84145 PROCALCITONIN (PCT): CPT | Performed by: PHYSICIAN ASSISTANT

## 2022-01-21 PROCEDURE — 85379 FIBRIN DEGRADATION QUANT: CPT | Performed by: PHYSICIAN ASSISTANT

## 2022-01-21 PROCEDURE — 86140 C-REACTIVE PROTEIN: CPT | Performed by: PHYSICIAN ASSISTANT

## 2022-01-21 PROCEDURE — G0382 LEV 3 HOSP TYPE B ED VISIT: HCPCS | Performed by: PHYSICIAN ASSISTANT

## 2022-01-21 RX ORDER — HEPARIN SODIUM 10000 [USP'U]/100ML
3-30 INJECTION, SOLUTION INTRAVENOUS
Status: DISCONTINUED | OUTPATIENT
Start: 2022-01-21 | End: 2022-01-25

## 2022-01-21 RX ORDER — ALBUTEROL SULFATE 90 UG/1
2 AEROSOL, METERED RESPIRATORY (INHALATION) EVERY 6 HOURS PRN
Status: DISCONTINUED | OUTPATIENT
Start: 2022-01-21 | End: 2022-01-26 | Stop reason: HOSPADM

## 2022-01-21 RX ORDER — ONDANSETRON 2 MG/ML
4 INJECTION INTRAMUSCULAR; INTRAVENOUS EVERY 6 HOURS PRN
Status: DISCONTINUED | OUTPATIENT
Start: 2022-01-21 | End: 2022-01-26 | Stop reason: HOSPADM

## 2022-01-21 RX ORDER — DEXAMETHASONE SODIUM PHOSPHATE 4 MG/ML
6 INJECTION, SOLUTION INTRA-ARTICULAR; INTRALESIONAL; INTRAMUSCULAR; INTRAVENOUS; SOFT TISSUE EVERY 24 HOURS
Status: DISCONTINUED | OUTPATIENT
Start: 2022-01-21 | End: 2022-01-26 | Stop reason: HOSPADM

## 2022-01-21 RX ORDER — MELATONIN
1000 DAILY
Status: DISCONTINUED | OUTPATIENT
Start: 2022-01-22 | End: 2022-01-26 | Stop reason: HOSPADM

## 2022-01-21 RX ORDER — HEPARIN SODIUM 1000 [USP'U]/ML
10000 INJECTION, SOLUTION INTRAVENOUS; SUBCUTANEOUS ONCE
Status: COMPLETED | OUTPATIENT
Start: 2022-01-21 | End: 2022-01-21

## 2022-01-21 RX ORDER — DEXAMETHASONE SODIUM PHOSPHATE 4 MG/ML
10 INJECTION, SOLUTION INTRA-ARTICULAR; INTRALESIONAL; INTRAMUSCULAR; INTRAVENOUS; SOFT TISSUE ONCE
Status: COMPLETED | OUTPATIENT
Start: 2022-01-21 | End: 2022-01-21

## 2022-01-21 RX ORDER — ACETAMINOPHEN 325 MG/1
650 TABLET ORAL EVERY 4 HOURS PRN
Status: DISCONTINUED | OUTPATIENT
Start: 2022-01-21 | End: 2022-01-26 | Stop reason: HOSPADM

## 2022-01-21 RX ORDER — HEPARIN SODIUM 1000 [USP'U]/ML
10000 INJECTION, SOLUTION INTRAVENOUS; SUBCUTANEOUS
Status: DISCONTINUED | OUTPATIENT
Start: 2022-01-21 | End: 2022-01-26

## 2022-01-21 RX ORDER — BIOTIN 5 MG
TABLET ORAL DAILY
Status: DISCONTINUED | OUTPATIENT
Start: 2022-01-22 | End: 2022-01-26 | Stop reason: HOSPADM

## 2022-01-21 RX ORDER — HEPARIN SODIUM 1000 [USP'U]/ML
5000 INJECTION, SOLUTION INTRAVENOUS; SUBCUTANEOUS
Status: DISCONTINUED | OUTPATIENT
Start: 2022-01-21 | End: 2022-01-26

## 2022-01-21 RX ORDER — LORAZEPAM 0.5 MG/1
0.5 TABLET ORAL 2 TIMES DAILY PRN
Status: DISCONTINUED | OUTPATIENT
Start: 2022-01-21 | End: 2022-01-26 | Stop reason: HOSPADM

## 2022-01-21 RX ADMIN — HEPARIN SODIUM 10000 UNITS: 1000 INJECTION, SOLUTION INTRAVENOUS; SUBCUTANEOUS at 22:04

## 2022-01-21 RX ADMIN — IOHEXOL 90 ML: 350 INJECTION, SOLUTION INTRAVENOUS at 19:44

## 2022-01-21 RX ADMIN — REMDESIVIR 200 MG: 100 INJECTION, POWDER, LYOPHILIZED, FOR SOLUTION INTRAVENOUS at 22:56

## 2022-01-21 RX ADMIN — DEXAMETHASONE SODIUM PHOSPHATE 10 MG: 4 INJECTION, SOLUTION INTRAMUSCULAR; INTRAVENOUS at 18:51

## 2022-01-21 NOTE — PATIENT INSTRUCTIONS
will bring by private vehicle  Advised to patient to use a wheelchair to get in and out of buildings and not walk because pulse ox is so low    90% when at rest but after ambulation and prolonged talking goes down to 77-80%

## 2022-01-21 NOTE — PROGRESS NOTES
[facility]    NAME: Mack Rogers is a 61 y o  female  : 1965    MRN: [id number]  DATE: 2022  TIME: 4:28 PM    Assessment and Plan   Hypoxia [R09 02]  1  Hypoxia  Transfer to other facility       Patient Instructions     Patient Instructions    will bring by private vehicle  Advised to patient to use a wheelchair to get in and out of buildings and not walk because pulse ox is so low  90% when at rest but after ambulation and prolonged talking goes down to 77-80%      Chief Complaint     Chief Complaint   Patient presents with    Shortness of Breath     cough, dyspnea for 2 weeks, worse last 2 days       History of Present Illness   61year old female here with complaint of shortness of breath and not being able to breath  Chest tight  Has been sick for the past 2 weeks  Has been worsening over the past 2 days  Pulse ox at home is in the 80s  Review of Systems   Review of Systems   Constitutional: Negative for appetite change, chills and fever  HENT: Positive for congestion  Negative for ear discharge, ear pain, facial swelling, postnasal drip, sinus pressure, sneezing and sore throat  Respiratory: Positive for cough, chest tightness and shortness of breath  Negative for wheezing  Cardiovascular: Positive for chest pain  Neurological: Negative for headaches         Current Medications     Current Outpatient Medications:     albuterol (ProAir HFA) 90 mcg/act inhaler, Inhale 2 puffs every 6 (six) hours as needed for wheezing, Disp: 8 5 g, Rfl: 0    albuterol (PROVENTIL HFA,VENTOLIN HFA) 90 mcg/act inhaler, Inhale 2 puffs every 6 (six) hours as needed for wheezing, Disp: , Rfl:     KRILL OIL PO, Take 1 tablet by mouth daily, Disp: , Rfl:     LORazepam (ATIVAN) 0 5 mg tablet, Take 1 tablet (0 5 mg total) by mouth 2 (two) times a day as needed for anxiety, Disp: 60 tablet, Rfl: 1    Multiple Vitamin (MULTIVITAMIN) tablet, Take 1 tablet by mouth daily, Disp: , Rfl:     Omega-3 Fatty Acids (FISH OIL) 1,000 mg, Take 1,000 mg by mouth daily  , Disp: , Rfl:     Vitamin D, Cholecalciferol, 1000 UNITS TABS, Take by mouth, Disp: , Rfl:     diclofenac potassium (CATAFLAM) 50 mg tablet, Take 1 tablet (50 mg total) by mouth 2 (two) times a day as needed (pain with food) (Patient not taking: Reported on 1/21/2022 ), Disp: 60 tablet, Rfl: 1    Current Allergies     Allergies as of 01/21/2022    (No Known Allergies)          The following portions of the patient's history were reviewed and updated as appropriate: allergies, current medications, past family history, past medical history, past social history, past surgical history and problem list    Past Medical History:   Diagnosis Date    Arthritis     Cancer [address] [address] )     endometrial    Endometrial adenocarcinoma ([facility] [address] )     Endometriosis     Facial twitching     [id number] resolved    Migraine     Obesity     Thyroid disease      Past Surgical History:   Procedure Laterality Date    ANKLE FRACTURE SURGERY Left     ANKLE SURGERY      APPENDECTOMY      CHOLECYSTECTOMY      FRACTURE SURGERY Left     ankle    HYSTERECTOMY      HYSTEROSCOPY      KNEE ARTHROSCOPY Left     OOPHORECTOMY      DE KNEE SCOPE,MED/LAT MENISECTOMY Left 6/3/2019    Procedure: KNEE ARTHROSOCPY; partial medial meniscectomy and debridement, synovectomy, chondroplasty;  Surgeon: Dean Pineda MD;  Location: [facility];  Service: Orthopedics    DE SHLDR ARTHROSCOP,SURG,W/ROTAT CUFF REPR Left 9/23/2020    Procedure: REPAIR ROTATOR CUFF  ARTHROSCOPIC with biceps tenotomy;  Surgeon: Jacqueline Hicks MD;  Location: [facility];  Service: Orthopedics     Family History   Problem Relation Age of Onset    Clotting disorder Sister         possible, history of multiple blood clots    Diabetes Father     Hypertension Father     Cancer Father         bladder cancer    Hypotension Family     No Known Problems Maternal Grandfather     Liver cancer Paternal Grandmother     Colon cancer Paternal Grandmother     No Known Problems Daughter     No Known Problems Maternal Grandmother     No Known Problems Daughter     Breast cancer additional onset Maternal Aunt     Bone cancer Maternal Aunt     No Known Problems Paternal Aunt      Social History     Socioeconomic History    Marital status: /Civil Union     Spouse name: Not on file    Number of children: 3    Years of education: Not on file    Highest education level: Not on file   Occupational History    Not on file   Tobacco Use    Smoking status: Never Smoker    Smokeless tobacco: Never Used    Tobacco comment: former smoker in all scripts   Vaping Use    Vaping Use: Never used   Substance and Sexual Activity    Alcohol use: Yes     Comment: OCASSIONAL    Drug use: No    Sexual activity: Not on file   Other Topics Concern    Not on file   Social History Narrative    ** Merged History Encounter **    Caffeine use              Social Determinants of Health     Financial Resource Strain: Not on file   Food Insecurity: Not on file   Transportation Needs: Not on file   Physical Activity: Not on file   Stress: Not on file   Social Connections: Not on file   Intimate Partner Violence: Not on file   Housing Stability: Not on file     Medications have been verified  Objective   Pulse 104   Temp 98 °F (36 7 °C) (Temporal)   Resp (!) 24   Ht 5' 7" (1 702 m)   Wt 132 kg (292 lb)   SpO2 (!) 85%   BMI 45 73 kg/m²      Physical Exam   Physical Exam  Vitals and nursing note reviewed  Constitutional:       General: She is not in acute distress  Appearance: She is well-developed  HENT:      Head: Normocephalic and atraumatic  Right Ear: Tympanic membrane normal       Left Ear: Tympanic membrane normal       Nose: Nose normal  No mucosal edema or rhinorrhea  Right Sinus: No maxillary sinus tenderness or frontal sinus tenderness        Left Sinus: No maxillary sinus tenderness or frontal sinus tenderness  Mouth/Throat:      Pharynx: No oropharyngeal exudate or posterior oropharyngeal erythema  Eyes:      Conjunctiva/sclera: Conjunctivae normal    Cardiovascular:      Rate and Rhythm: Normal rate and regular rhythm  Heart sounds: Normal heart sounds  No murmur heard  Pulmonary:      Effort: Tachypnea and respiratory distress present  Breath sounds: Rales present

## 2022-01-22 PROBLEM — R09.02 HYPOXIA: Status: ACTIVE | Noted: 2022-01-22

## 2022-01-22 PROBLEM — J12.82 PNEUMONIA DUE TO COVID-19 VIRUS: Status: ACTIVE | Noted: 2022-01-22

## 2022-01-22 PROBLEM — U07.1 PNEUMONIA DUE TO COVID-19 VIRUS: Status: ACTIVE | Noted: 2022-01-22

## 2022-01-22 PROBLEM — I26.99 BILATERAL PULMONARY EMBOLISM (HCC): Status: ACTIVE | Noted: 2022-01-22

## 2022-01-22 LAB
4HR DELTA HS TROPONIN: -45 NG/L
ABO GROUP BLD: NORMAL
ALBUMIN SERPL BCP-MCNC: 3.6 G/DL (ref 3.5–5)
ALP SERPL-CCNC: 93 U/L (ref 34–104)
ALT SERPL W P-5'-P-CCNC: 29 U/L (ref 7–52)
ANION GAP SERPL CALCULATED.3IONS-SCNC: 8 MMOL/L (ref 4–13)
APTT PPP: 112 SECONDS (ref 23–37)
APTT PPP: 130 SECONDS (ref 23–37)
APTT PPP: 44 SECONDS (ref 23–37)
AST SERPL W P-5'-P-CCNC: 35 U/L (ref 13–39)
BASOPHILS # BLD AUTO: 0.01 THOUSANDS/ΜL (ref 0–0.1)
BASOPHILS NFR BLD AUTO: 0 % (ref 0–1)
BILIRUB SERPL-MCNC: 0.55 MG/DL (ref 0.2–1)
BUN SERPL-MCNC: 16 MG/DL (ref 5–25)
CALCIUM SERPL-MCNC: 8.8 MG/DL (ref 8.4–10.2)
CARDIAC TROPONIN I PNL SERPL HS: 38 NG/L
CHLORIDE SERPL-SCNC: 104 MMOL/L (ref 96–108)
CO2 SERPL-SCNC: 25 MMOL/L (ref 21–32)
CREAT SERPL-MCNC: 0.57 MG/DL (ref 0.6–1.3)
CRP SERPL QL: 30.5 MG/L
EOSINOPHIL # BLD AUTO: 0 THOUSAND/ΜL (ref 0–0.61)
EOSINOPHIL NFR BLD AUTO: 0 % (ref 0–6)
ERYTHROCYTE [DISTWIDTH] IN BLOOD BY AUTOMATED COUNT: 13.8 % (ref 11.6–15.1)
GFR SERPL CREATININE-BSD FRML MDRD: 103 ML/MIN/1.73SQ M
GLUCOSE SERPL-MCNC: 172 MG/DL (ref 65–140)
HCT VFR BLD AUTO: 43.9 % (ref 34.8–46.1)
HGB BLD-MCNC: 14 G/DL (ref 11.5–15.4)
IMM GRANULOCYTES # BLD AUTO: 0.03 THOUSAND/UL (ref 0–0.2)
IMM GRANULOCYTES NFR BLD AUTO: 1 % (ref 0–2)
LACTATE SERPL-SCNC: 1.1 MMOL/L (ref 0.5–2)
LYMPHOCYTES # BLD AUTO: 0.71 THOUSANDS/ΜL (ref 0.6–4.47)
LYMPHOCYTES NFR BLD AUTO: 17 % (ref 14–44)
MCH RBC QN AUTO: 28.4 PG (ref 26.8–34.3)
MCHC RBC AUTO-ENTMCNC: 31.9 G/DL (ref 31.4–37.4)
MCV RBC AUTO: 89 FL (ref 82–98)
MONOCYTES # BLD AUTO: 0.13 THOUSAND/ΜL (ref 0.17–1.22)
MONOCYTES NFR BLD AUTO: 3 % (ref 4–12)
NEUTROPHILS # BLD AUTO: 3.2 THOUSANDS/ΜL (ref 1.85–7.62)
NEUTS SEG NFR BLD AUTO: 79 % (ref 43–75)
NRBC BLD AUTO-RTO: 0 /100 WBCS
NT-PROBNP SERPL-MCNC: 238 PG/ML
PLATELET # BLD AUTO: 209 THOUSANDS/UL (ref 149–390)
PMV BLD AUTO: 10.8 FL (ref 8.9–12.7)
POTASSIUM SERPL-SCNC: 4.4 MMOL/L (ref 3.5–5.3)
PROCALCITONIN SERPL-MCNC: <0.05 NG/ML
PROT SERPL-MCNC: 6.9 G/DL (ref 6.4–8.4)
RBC # BLD AUTO: 4.93 MILLION/UL (ref 3.81–5.12)
RH BLD: POSITIVE
SODIUM SERPL-SCNC: 137 MMOL/L (ref 135–147)
WBC # BLD AUTO: 4.08 THOUSAND/UL (ref 4.31–10.16)

## 2022-01-22 PROCEDURE — 80053 COMPREHEN METABOLIC PANEL: CPT | Performed by: PHYSICIAN ASSISTANT

## 2022-01-22 PROCEDURE — 84484 ASSAY OF TROPONIN QUANT: CPT | Performed by: PHYSICIAN ASSISTANT

## 2022-01-22 PROCEDURE — 99223 1ST HOSP IP/OBS HIGH 75: CPT | Performed by: PHYSICIAN ASSISTANT

## 2022-01-22 PROCEDURE — 36415 COLL VENOUS BLD VENIPUNCTURE: CPT | Performed by: PHYSICIAN ASSISTANT

## 2022-01-22 PROCEDURE — 85730 THROMBOPLASTIN TIME PARTIAL: CPT | Performed by: INTERNAL MEDICINE

## 2022-01-22 PROCEDURE — 86900 BLOOD TYPING SEROLOGIC ABO: CPT | Performed by: PHYSICIAN ASSISTANT

## 2022-01-22 PROCEDURE — 93005 ELECTROCARDIOGRAM TRACING: CPT

## 2022-01-22 PROCEDURE — 85025 COMPLETE CBC W/AUTO DIFF WBC: CPT | Performed by: PHYSICIAN ASSISTANT

## 2022-01-22 PROCEDURE — 86901 BLOOD TYPING SEROLOGIC RH(D): CPT | Performed by: PHYSICIAN ASSISTANT

## 2022-01-22 PROCEDURE — 85730 THROMBOPLASTIN TIME PARTIAL: CPT | Performed by: PHYSICIAN ASSISTANT

## 2022-01-22 PROCEDURE — 85730 THROMBOPLASTIN TIME PARTIAL: CPT

## 2022-01-22 PROCEDURE — 83605 ASSAY OF LACTIC ACID: CPT | Performed by: PHYSICIAN ASSISTANT

## 2022-01-22 PROCEDURE — 84145 PROCALCITONIN (PCT): CPT | Performed by: PHYSICIAN ASSISTANT

## 2022-01-22 RX ORDER — DOXYCYCLINE HYCLATE 100 MG/1
100 CAPSULE ORAL EVERY 12 HOURS SCHEDULED
Status: DISCONTINUED | OUTPATIENT
Start: 2022-01-22 | End: 2022-01-23

## 2022-01-22 RX ORDER — CEFTRIAXONE 1 G/50ML
1000 INJECTION, SOLUTION INTRAVENOUS EVERY 24 HOURS
Status: DISCONTINUED | OUTPATIENT
Start: 2022-01-22 | End: 2022-01-23

## 2022-01-22 RX ADMIN — HEPARIN SODIUM 5000 UNITS: 1000 INJECTION, SOLUTION INTRAVENOUS; SUBCUTANEOUS at 12:41

## 2022-01-22 RX ADMIN — DOXYCYCLINE 100 MG: 100 CAPSULE ORAL at 21:35

## 2022-01-22 RX ADMIN — CEFTRIAXONE 1000 MG: 1 INJECTION, SOLUTION INTRAVENOUS at 05:49

## 2022-01-22 RX ADMIN — DOXYCYCLINE 100 MG: 100 CAPSULE ORAL at 10:02

## 2022-01-22 RX ADMIN — HEPARIN SODIUM 17 UNITS/KG/HR: 10000 INJECTION, SOLUTION INTRAVENOUS at 13:53

## 2022-01-22 RX ADMIN — Medication 1000 UNITS: at 10:02

## 2022-01-22 RX ADMIN — B-COMPLEX W/ C & FOLIC ACID TAB 1 TABLET: TAB at 10:02

## 2022-01-22 RX ADMIN — REMDESIVIR 100 MG: 100 INJECTION, POWDER, LYOPHILIZED, FOR SOLUTION INTRAVENOUS at 22:09

## 2022-01-22 RX ADMIN — DEXAMETHASONE SODIUM PHOSPHATE 6 MG: 4 INJECTION, SOLUTION INTRA-ARTICULAR; INTRALESIONAL; INTRAMUSCULAR; INTRAVENOUS; SOFT TISSUE at 21:35

## 2022-01-22 NOTE — UTILIZATION REVIEW
Initial Clinical Review    Admission: Date/Time/Statement:   Admission Orders (From admission, onward)     Ordered        01/21/22 2054  Inpatient Admission  Once                      Orders Placed This Encounter   Procedures    Inpatient Admission     Standing Status:   Standing     Number of Occurrences:   1     Order Specific Question:   Level of Care     Answer:   Med Surg [16]     Order Specific Question:   Estimated length of stay     Answer:   More than 2 Midnights     Order Specific Question:   Certification     Answer:   I certify that inpatient services are medically necessary for this patient for a duration of greater than two midnights  See H&P and MD Progress Notes for additional information about the patient's course of treatment  ED Arrival Information     Expected Arrival Acuity    1/21/2022 1/21/2022 16:42 Emergent         Means of arrival Escorted by Service Admission type    [facility] Emergency         Arrival complaint    Hypoxia sob cough tightness Covid +        Chief Complaint   Patient presents with    Shortness of Breath     Covid positive home test  since 1 week    Chest Pain       Initial Presentation:61year old female to the ED from urgent care with complaints of shortness of breath, chest pain, found to be hypoxic on ambulation at urgent care  + COVID 19  Admitted to inpatient for pneumonia due to COVID 19, bilateral PE, hypoxia  Arrives tachypneic, pulse ox 89% on room air  Placed on 4LNC  D-dimer elevated  CT chest shows: Acute bilateral pulmonary emboli, covid pneumonia  Started on IV heparin drip, decadron, REmdesivir  IV abx started  WEan oxygen as able  Check ECHO, Venous duplex       Date: 1/22    ED Triage Vitals   Temperature Pulse Respirations Blood Pressure SpO2   01/21/22 1805 01/21/22 1805 01/21/22 1805 01/21/22 1807 01/21/22 1805   98 7 °F (37 1 °C) 98 (!) 30 167/79 (!) 89 %      Temp Source Heart Rate Source Patient Position - Orthostatic VS BP Location FiO2 (%)   01/21/22 1805 01/21/22 1900 01/22/22 1153 01/21/22 1900 --   Oral Monitor Lying Right arm       Pain Score       01/21/22 1805       No Pain          Wt Readings from Last 1 Encounters:   01/21/22 132 kg (292 lb)     Additional Vital Signs:   Time Temp Pulse Resp BP MAP (mmHg) SpO2 Calculated FIO2 (%) - Nasal Cannula Nasal Cannula O2 Flow Rate (L/min) O2 Device Patient Position - Orthostatic VS   01/22/22 1500 -- 94 18 133/58 83 92 % 36 4 L/min Nasal cannula Lying   01/22/22 1354 -- 91 18 139/63 -- 92 % 36 4 L/min Nasal cannula Lying   01/22/22 1153 -- 74 18 147/89 -- 91 % 36 4 L/min Nasal cannula Lying   01/22/22 0823 -- -- -- -- -- -- -- -- Nasal cannula --   01/22/22 0811 97 5 °F (36 4 °C) 72 22 137/70 -- 91 % 36 4 L/min Nasal cannula --   01/22/22 0419 -- 79 24 Abnormal  135/74 -- 93 % 36 4 L/min Nasal cannula --   01/22/22 0200 -- 63 28 Abnormal  131/69 93 94 % 36 4 L/min -- --   01/21/22 2330 -- 68 26 Abnormal  155/76 109 90 % 36 4 L/min -- --   01/21/22 2230 -- 85 26 Abnormal  162/70 100 92 % 36 4 L/min Nasal cannula --   01/21/22 2045 -- 75 28 Abnormal  130/81 99 94 % 36 4 L/min Nasal cannula --   01/21/22 1900 -- 73 26 Abnormal  155/69 99 97 % -- -- Nasal cannula --   01/21/22 1807 -- -- -- 167/79 -- -- -- -- -- --   01/21/22 1805 98 7 °F (37 1 °C) 98 30 Abnormal  -- -- 89 % Abnormal  -- -- None (Room air) --       Pertinent Labs/Diagnostic Test Results:   1/21 EKG: Interpretation:     Interpretation: normal    Rate:     ECG rate:  80    ECG rate assessment: normal    Rhythm:     Rhythm: sinus rhythm    Ectopy:     Ectopy: none    QRS:     QRS axis:  Normal  Conduction:     Conduction: normal    ST segments:     ST segments:  Normal  T waves:     T waves: normal    Comments:      No evidence of acute cardiac ischemia     1/21 CT chest: Acute bilateral pulmonary emboli  Covid pneumonia  No right heart strain identified  Presumed mild mediastinal and hilar area reactive adenopathy  Cholecystectomy   1/22 CXR:   Extensive bilateral groundglass infiltrates compatible with Covid 19 pneumonia  Results from last 7 days   Lab Units 01/22/22 0417 01/21/22  1844   WBC Thousand/uL 4 08* 5 66   HEMOGLOBIN g/dL 14 0 13 8   HEMATOCRIT % 43 9 44 0   PLATELETS Thousands/uL 209 214   NEUTROS ABS Thousands/µL 3 20 3 65         Results from last 7 days   Lab Units 01/22/22  0417 01/21/22  1844   SODIUM mmol/L 137 142   POTASSIUM mmol/L 4 4 3 6   CHLORIDE mmol/L 104 104   CO2 mmol/L 25 31   ANION GAP mmol/L 8 7   BUN mg/dL 16 18   CREATININE mg/dL 0 57* 0 75   EGFR ml/min/1 73sq m 103 88   CALCIUM mg/dL 8 8 9 1     Results from last 7 days   Lab Units 01/22/22  0417 01/21/22  1844   AST U/L 35 29   ALT U/L 29 24   ALK PHOS U/L 93 82   TOTAL PROTEIN g/dL 6 9 6 8   ALBUMIN g/dL 3 6 3 6   TOTAL BILIRUBIN mg/dL 0 55 0 82         Results from last 7 days   Lab Units 01/22/22  0417 01/21/22  1844   GLUCOSE RANDOM mg/dL 172* 98       Results from last 7 days   Lab Units 01/21/22  1844   CK TOTAL U/L 440*   CK MB INDEX % 1 2   CK MB ng/mL 5 1     Results from last 7 days   Lab Units 01/22/22  0229 01/21/22  2045 01/21/22  1844   HS TNI 0HR ng/L  --   --  83   HS TNI 2HR ng/L  --  69  --    HSTNI D2 ng/L  --  -14  --    HS TNI 4HR ng/L 38  --   --    HSTNI D4 ng/L -45  --   --      Results from last 7 days   Lab Units 01/21/22  1844   D-DIMER QUANTITATIVE ug/ml FEU >20 00*     Results from last 7 days   Lab Units 01/22/22  1157 01/22/22  0417 01/21/22  1844   PROTIME seconds  --   --  13 1   INR   --   --  1 00   PTT seconds 44* 130* 27         Results from last 7 days   Lab Units 01/22/22  0417 01/21/22  1844   PROCALCITONIN ng/ml <0 05 <0 05     Results from last 7 days   Lab Units 01/22/22  0229 01/21/22  1844   LACTIC ACID mmol/L 1 1 1 0           Results from last 7 days   Lab Units 01/21/22  1844   BLOOD CULTURE  Received in Microbiology Lab  Culture in Progress  Received in Microbiology Lab   Culture in Progress             ED Treatment:   Medication Administration from 01/21/2022 1620 to 01/22/2022 1550       Date/Time Order Dose Route Action     01/21/2022 1851 dexamethasone (DECADRON) injection 10 mg 10 mg Intravenous Given     01/21/2022 2204 heparin (porcine) injection 10,000 Units 10,000 Units Intravenous Given     01/22/2022 1353 heparin (porcine) 25,000 units in 0 45% NaCl 250 mL infusion (premix) 17 Units/kg/hr Intravenous New Bag     01/22/2022 1241 heparin (porcine) injection 5,000 Units 5,000 Units Intravenous Given     01/22/2022 1002 multivitamin stress formula tablet 1 tablet 1 tablet Oral Given     01/22/2022 1002 cholecalciferol (VITAMIN D3) tablet 1,000 Units 1,000 Units Oral Given     01/21/2022 2256 remdesivir (Veklury) 200 mg in sodium chloride 0 9 % 290 mL IVPB 200 mg Intravenous Given     01/22/2022 0549 cefTRIAXone (ROCEPHIN) IVPB (premix in dextrose) 1,000 mg 50 mL 1,000 mg Intravenous New Bag     01/22/2022 1002 doxycycline hyclate (VIBRAMYCIN) capsule 100 mg 100 mg Oral Given        Past Medical History:   Diagnosis Date    Arthritis     Cancer ([facility] [address] )     endometrial    [facility]ometrial adenocarcinoma [facility] [address] [facility]     Endometriosis     Facial twitching     06jun2016 resolved    Migraine     Obesity     Thyroid disease        Admitting Diagnosis: SOB (shortness of breath) [R06 02]  Age/Sex: 61 y o  female  Admission Orders:  UP and OOB  TEle  Heparin drip with routine PTT  CBC, CRP, CMP, PRo bnp  ECHO  Scheduled Medications:  cefTRIAXone, 1,000 mg, Intravenous, Q24H  cholecalciferol, 1,000 Units, Oral, Daily  dexamethasone, 6 mg, Intravenous, Q24H  doxycycline hyclate, 100 mg, Oral, Q12H [address]  Krill Oil, , Oral, Daily  multivitamin stress formula, 1 tablet, Oral, Daily  remdesivir, 100 mg, Intravenous, Q24H      Continuous IV Infusions:  heparin (porcine), 3-30 Units/kg/hr (Order-Specific), Intravenous, Titrated      PRN Meds:  acetaminophen, 650 mg, Oral, Q4H PRN  albuterol, 2 puff, Inhalation, Q6H PRN  heparin (porcine), 10,000 Units, Intravenous, Q1H PRN  heparin (porcine), 5,000 Units, Intravenous, Q1H PRN  LORazepam, 0 5 mg, Oral, BID PRN  ondansetron, 4 mg, Intravenous, Q6H PRN        None    Network Utilization Review Department  ATTENTION: Please call with any questions or concerns to [phone] and carefully listen to the prompts so that you are directed to the right person  All voicemails are confidential   Alana Castro all requests for admission clinical reviews, approved or denied determinations and any other requests to dedicated fax number below belonging to the campus where the patient is receiving treatment   List of dedicated fax numbers for the Facilities:  [address] DENIALS (Administrative/Medical Necessity) [phone]   [address] (Maternity/NICU/Pediatrics) [phone]   [address] [place] [address] [address] [place] [address]  [address] [facility] [place] [address] [address] [place] [address] [address] [place] [address] [phone]

## 2022-01-22 NOTE — H&P
[address]  H&P- Dulce Duval 1965, 61 y o  female MRN: [id number]  Unit/Bed#: ED 23 Encounter: [id number]  Primary Care Provider: Miles Norman MD   Date and time admitted to hospital: 1/21/2022  6:09 PM    * Pneumonia due to COVID-19 virus  Assessment & Plan  · Admit to telemetry  · Obtain baseline COVID labs and trend as indicated  · Placed on O2 and respiratory protocol with incentive spirometry  · Begin remdesivir  · Continue pre-hospital Proventil 90 mcg 2 puffs q 6 hours p r n  · Will give Rocephin 1 g IV daily and Vibramycin 100 mg p o  q 12 hours  · Begin Decadron 6 mg IV daily    Bilateral pulmonary embolism (HCC)  Assessment & Plan  · Continue heparin drip  · Obtain echocardiogram and venous duplex extended in a m  · Placed on O2 and respiratory protocol    Hypoxia  Assessment & Plan  · Likely multifactorial given her COVID-19 infection as well as bilateral pulmonary embolism  · Being treated as per above    Vitamin D deficiency  Assessment & Plan  Continue pre-hospital vitamin D3 1000 units p o  daily    Obstructive sleep apnea  Assessment & Plan  Will place on O2 and respiratory protocol    Obesity due to excess calories  Assessment & Plan  Encourage healthy weight loss and supportive care      VTE Prophylaxis: Heparin Drip  Code Status:  Level 1  Discussion with family:  None present at bedside at time exam    Anticipated Length of Stay:  Patient will be admitted on an Inpatient basis with an anticipated length of stay of  > 2 midnights  Justification for Hospital Stay:  COVID-19 pneumonia bilateral pulmonary embolism requiring O2 support, continue heparin drip, initiation of remdesivir and IV steroids    Total Time for Visit, including Counseling / Coordination of Care: 1 hour  Greater than 50% of this total time spent on direct patient counseling and coordination of care      Chief Complaint:   Shortness of breath and chest pressure x4 days    History of Present Illness:    Mack Bai is a 61 y o  female who presents with shortness of breath and chest pressure x4 days  Patient presents ER for further evaluation treatment 4 day history of shortness of breath the point of dyspnea with minimal activity as well as midsternal chest pressure which she states is worse with a deep breath or movement  Patient states that she had a home O2 of 77-78% last evening was unable to catch her breath with no improvement with our albuterol MDI  Patient came to the ER and was found have an O2 saturation 85%  Patient denies any vomiting, diarrhea, fever, chills, loss sense of smell or taste, no cough  Patient is unvaccinated says that her  is also ill with positive at home  Denies any calf pain but does state that her left lower extremity is chronically 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  Review of Systems:    Review of Systems   Constitutional: Negative for chills and fever  HENT: Negative for congestion and sore throat  Respiratory: Positive for chest tightness and shortness of breath  Negative for cough and wheezing  Cardiovascular: Positive for leg swelling  Negative for chest pain and palpitations  Gastrointestinal: Negative for abdominal pain, diarrhea, nausea and vomiting  Genitourinary: Negative for dysuria, frequency, hematuria and urgency  Musculoskeletal: Negative for arthralgias and myalgias  Skin: Negative for wound  Neurological: Negative for dizziness, syncope, light-headedness and headaches  All other systems reviewed and are negative        Past Medical and Surgical History:     Past Medical History:   Diagnosis Date    Arthritis     Cancer [facility])     endometrial    [facility]ometrial adenocarcinoma [facility] [address] )     Endometriosis     Facial twitching     06jun2016 resolved    Migraine     Obesity     Thyroid disease        Past Surgical History:   Procedure Laterality Date    ANKLE FRACTURE SURGERY Left     ANKLE SURGERY      APPENDECTOMY      CHOLECYSTECTOMY      FRACTURE SURGERY Left     ankle    HYSTERECTOMY      HYSTEROSCOPY      KNEE ARTHROSCOPY Left     OOPHORECTOMY      MO KNEE SCOPE,MED/LAT MENISECTOMY Left 6/3/2019    Procedure: KNEE ARTHROSOCPY; partial medial meniscectomy and debridement, synovectomy, chondroplasty;  Surgeon: Becki Cardenas MD;  Location: [facility];  Service: Orthopedics    MO SHLDR ARTHROSCOP,SURG,W/ROTAT CUFF REPR Left 9/23/2020    Procedure: REPAIR ROTATOR CUFF  ARTHROSCOPIC with biceps tenotomy;  Surgeon: Cecille Vo MD;  Location: [facility];  Service: Orthopedics       Meds/Allergies:    Prior to Admission medications    Medication Sig Start Date End Date Taking? Authorizing Provider   albuterol (PROVENTIL HFA,VENTOLIN HFA) 90 mcg/act inhaler Inhale 2 puffs every 6 (six) hours as needed for wheezing   Yes Historical Provider, MD   Multiple Vitamin (MULTIVITAMIN) tablet Take 1 tablet by mouth daily   Yes Historical Provider, MD   Omega-3 Fatty Acids (FISH OIL) 1,000 mg Take 1,000 mg by mouth daily     Yes Historical Provider, MD   Vitamin D, Cholecalciferol, 1000 UNITS TABS Take by mouth   Yes Historical Provider, MD   albuterol (ProAir HFA) 90 mcg/act inhaler Inhale 2 puffs every 6 (six) hours as needed for wheezing 3/29/21   Tanya Haney MD   diclofenac potassium (CATAFLAM) 50 mg tablet Take 1 tablet (50 mg total) by mouth 2 (two) times a day as needed (pain with food)  Patient not taking: Reported on 1/21/2022  3/29/21   Tanya Haney MD   KRILL OIL PO Take 1 tablet by mouth daily    Historical Provider, MD   LORazepam (ATIVAN) 0 5 mg tablet Take 1 tablet (0 5 mg total) by mouth 2 (two) times a day as needed for anxiety 10/29/20   Tanya Haney MD     all medications and allergies reviewed    Allergies: No Known Allergies    Social History:     Marital Status: /Civil Union   Occupation:  [employer]  Patient Pre-hospital Living Situation:  Resides at home with  and son  Patient Pre-hospital Level of Mobility:  Full without assist  Patient Pre-hospital Diet Restrictions:  None  Substance Use History:   Social History     Substance and Sexual Activity   Alcohol Use Yes    Comment: OCASSIONAL     Social History     Tobacco Use   Smoking Status Never Smoker   Smokeless Tobacco Never Used   Tobacco Comment    former smoker in all scripts     Social History     Substance and Sexual Activity   Drug Use No       Family History:  I have reviewed the patient's family history    Physical Exam:     Vitals:   Blood Pressure: 135/74 (01/22/22 0419)  Pulse: 79 (01/22/22 0419)  Temperature: 98 7 °F (37 1 °C) (01/21/22 1805)  Temp Source: Oral (01/21/22 1805)  Respirations: (!) 24 (01/22/22 0419)  SpO2: 93 % (01/22/22 0419)    Physical Exam  Vitals and nursing note reviewed  Constitutional:       General: She is not in acute distress  Appearance: Normal appearance  HENT:      Head: Normocephalic and atraumatic  Right Ear: Tympanic membrane normal       Left Ear: Tympanic membrane normal       Nose: Nose normal       Mouth/Throat:      Mouth: Mucous membranes are moist       Pharynx: Oropharynx is clear  No posterior oropharyngeal erythema  Eyes:      Extraocular Movements: Extraocular movements intact  Conjunctiva/sclera: Conjunctivae normal       Pupils: Pupils are equal, round, and reactive to light  Cardiovascular:      Rate and Rhythm: Normal rate and regular rhythm  Pulses: Normal pulses  Heart sounds: Normal heart sounds  No murmur heard  Pulmonary:      Effort: Pulmonary effort is normal  Tachypnea present  No respiratory distress  Breath sounds: Normal breath sounds  Decreased air movement present  No wheezing, rhonchi or rales  Abdominal:      General: Bowel sounds are normal       Palpations: Abdomen is soft  Tenderness: There is no abdominal tenderness     Musculoskeletal:      Cervical back: Normal range of motion and neck supple  No muscular tenderness  Left lower leg: No edema  Skin:     General: Skin is warm and dry  Capillary Refill: Capillary refill takes less than 2 seconds  Neurological:      General: No focal deficit present  Mental Status: She is alert and oriented to person, place, and time  Additional Data:     Lab Results: I have personally reviewed pertinent reports  Results from last 7 days   Lab Units 01/22/22  0417   WBC Thousand/uL 4 08*   HEMOGLOBIN g/dL 14 0   HEMATOCRIT % 43 9   PLATELETS Thousands/uL 209   NEUTROS PCT % 79*   LYMPHS PCT % 17   MONOS PCT % 3*   EOS PCT % 0     Results from last 7 days   Lab Units 01/22/22  0417   SODIUM mmol/L 137   POTASSIUM mmol/L 4 4   CHLORIDE mmol/L 104   CO2 mmol/L 25   BUN mg/dL 16   CREATININE mg/dL 0 57*   ANION GAP mmol/L 8   CALCIUM mg/dL 8 8   ALBUMIN g/dL 3 6   TOTAL BILIRUBIN mg/dL 0 55   ALK PHOS U/L 93   ALT U/L 29   AST U/L 35   GLUCOSE RANDOM mg/dL 172*     Results from last 7 days   Lab Units 01/21/22  1844   INR  1 00             Results from last 7 days   Lab Units 01/22/22  0417 01/22/22  0229 01/21/22  1844   LACTIC ACID mmol/L  --  1 1 1 0   PROCALCITONIN ng/ml <0 05  --  <0 05       Imaging: I have personally reviewed pertinent reports  CTA ED chest PE Study   Final Result by Delfin Barragan MD (01/21 2033)      Acute bilateral pulmonary emboli  Covid pneumonia  No right heart strain identified  Presumed mild mediastinal and hilar area reactive adenopathy        Cholecystectomy             I personally discussed this study with Tiffanie Simpson on 1/21/2022 at 8:32 PM                      Workstation performed: [id number]         XR chest 1 view portable    (Results Pending)   VAS lower limb venous duplex study, complete bilateral    (Results Pending)         EKG, Pathology, and Other Studies Reviewed on Admission:   · EKG: N/A    Epic / Care Everywhere Records Reviewed: Yes    ** Please Note: This note has been constructed using a voice recognition system   **

## 2022-01-22 NOTE — ASSESSMENT & PLAN NOTE
· Likely multifactorial given her COVID-19 infection as well as bilateral pulmonary embolism  · Being treated as per above

## 2022-01-22 NOTE — ASSESSMENT & PLAN NOTE
· Continue heparin drip  · Obtain echocardiogram and venous duplex extended in a m    · Placed on O2 and respiratory protocol

## 2022-01-22 NOTE — ED NOTES
HEPARIN PUMP FOUND WITH DEAD BATTERY  UNK WHEN INFUSION STOPPED, RESTARTED     [employer], BELG[place]  91/35/96 [id number]

## 2022-01-22 NOTE — ED PROVIDER NOTES
History  Chief Complaint   Patient presents with    Shortness of Breath     Covid positive home test  since 1 week    Chest Pain     21-year-old female history of migraines presents COVID positive complaining of shortness of breath  Patient reports she went to urgent care today, was found to be 85% on room air  Currently saturating well on 5-6 L nasal cannula, turned down to 3 L nasal cannula  She states her O2 saturation at home last night was 77 is 78% when she felt like she could not catch her breath  Also reports some chest pain that she describes as substernal chest pressure without radiation, nausea or sweats  Denies any vomiting, diarrhea, pelvic pain, lower leg pain or swelling or any personal or family history of DVT/PE  Prior to Admission Medications   Prescriptions Last Dose Informant Patient Reported? Taking? KRILL OIL PO   Yes No   Sig: Take 1 tablet by mouth daily   LORazepam (ATIVAN) 0 5 mg tablet   No No   Sig: Take 1 tablet (0 5 mg total) by mouth 2 (two) times a day as needed for anxiety   Multiple Vitamin (MULTIVITAMIN) tablet   Yes Yes   Sig: Take 1 tablet by mouth daily   Omega-3 Fatty Acids (FISH OIL) 1,000 mg   Yes Yes   Sig: Take 1,000 mg by mouth daily     Vitamin D, Cholecalciferol, 1000 UNITS TABS   Yes Yes   Sig: Take by mouth   albuterol (PROVENTIL HFA,VENTOLIN HFA) 90 mcg/act inhaler   Yes Yes   Sig: Inhale 2 puffs every 6 (six) hours as needed for wheezing   albuterol (ProAir HFA) 90 mcg/act inhaler   No No   Sig: Inhale 2 puffs every 6 (six) hours as needed for wheezing   diclofenac potassium (CATAFLAM) 50 mg tablet   No No   Sig: Take 1 tablet (50 mg total) by mouth 2 (two) times a day as needed (pain with food)   Patient not taking: Reported on 1/21/2022       Facility-Administered Medications: None       Past Medical History:   Diagnosis Date    Arthritis     Cancer [facility])     endometrial    Endometrial adenocarcinoma ([facility] [address] )     Endometriosis     Facial twitching     [id number] resolved    Migraine     Obesity     Thyroid disease        Past Surgical History:   Procedure Laterality Date    ANKLE FRACTURE SURGERY Left     ANKLE SURGERY      APPENDECTOMY      CHOLECYSTECTOMY      FRACTURE SURGERY Left     ankle    HYSTERECTOMY      HYSTEROSCOPY      KNEE ARTHROSCOPY Left     OOPHORECTOMY      NY KNEE SCOPE,MED/LAT MENISECTOMY Left 6/3/2019    Procedure: KNEE ARTHROSOCPY; partial medial meniscectomy and debridement, synovectomy, chondroplasty;  Surgeon: Britton Ventura MD;  Location: [facility];  Service: Orthopedics    NY SHLDR ARTHROSCOP,SURG,W/ROTAT CUFF REPR Left 9/23/2020    Procedure: REPAIR ROTATOR CUFF  ARTHROSCOPIC with biceps tenotomy;  Surgeon: Ankit Wellington MD;  Location: [facility];  Service: Orthopedics       Family History   Problem Relation Age of Onset    Clotting disorder Sister         possible, history of multiple blood clots    Diabetes Father     Hypertension Father     Cancer Father         bladder cancer    Hypotension Family     No Known Problems Maternal Grandfather     Liver cancer Paternal Grandmother     Colon cancer Paternal Grandmother     No Known Problems Daughter     No Known Problems Maternal Grandmother     No Known Problems Daughter     Breast cancer additional onset Maternal Aunt     Bone cancer Maternal Aunt     No Known Problems Paternal Aunt      I have reviewed and agree with the history as documented  E-Cigarette/Vaping    E-Cigarette Use Never User      E-Cigarette/Vaping Substances     Social History     Tobacco Use    Smoking status: Never Smoker    Smokeless tobacco: Never Used    Tobacco comment: former smoker in all scripts   Vaping Use    Vaping Use: Never used   Substance Use Topics    Alcohol use: Yes     Comment: OCASSIONAL    Drug use: No       Review of Systems   Constitutional: Negative for chills, fatigue and fever  HENT: Negative for ear pain and sore throat      Eyes: Negative for pain  Respiratory: Positive for shortness of breath  Negative for cough and wheezing  Cardiovascular: Positive for chest pain  Negative for palpitations and leg swelling  Gastrointestinal: Negative for abdominal pain, constipation, diarrhea, nausea and vomiting  Endocrine: Negative for polyuria  Genitourinary: Negative for dysuria and pelvic pain  Musculoskeletal: Negative for arthralgias, myalgias, neck pain and neck stiffness  Skin: Negative for rash  Neurological: Negative for dizziness, syncope, light-headedness and headaches  All other systems reviewed and are negative  Physical Exam  Physical Exam  Constitutional:       Appearance: She is well-developed  HENT:      Head: Normocephalic and atraumatic  Right Ear: External ear normal       Left Ear: External ear normal       Mouth/Throat:      Pharynx: No oropharyngeal exudate  Eyes:      Extraocular Movements: Extraocular movements intact  Cardiovascular:      Rate and Rhythm: Normal rate and regular rhythm  Heart sounds: Normal heart sounds  Pulmonary:      Effort: Pulmonary effort is normal  Tachypnea present  Breath sounds: Normal breath sounds  Comments: Mildly tachypneic  Decreased air movement  Abdominal:      General: Bowel sounds are normal       Palpations: Abdomen is soft  Tenderness: There is no abdominal tenderness  Musculoskeletal:         General: Normal range of motion  Cervical back: Normal range of motion  Skin:     General: Skin is warm  Capillary Refill: Capillary refill takes less than 2 seconds  Neurological:      General: No focal deficit present  Mental Status: She is alert and oriented to person, place, and time     Psychiatric:         Mood and Affect: Mood normal          Vital Signs  ED Triage Vitals   Temperature Pulse Respirations Blood Pressure SpO2   01/21/22 1805 01/21/22 1805 01/21/22 1805 01/21/22 1807 01/21/22 1805   98 7 °F (37 1 °C) 98 (!) 30 167/79 (!) 89 %      Temp Source Heart Rate Source Patient Position - Orthostatic VS BP Location FiO2 (%)   01/21/22 1805 01/21/22 1900 -- 01/21/22 1900 --   Oral Monitor  Right arm       Pain Score       01/21/22 1805       No Pain           Vitals:    01/21/22 1805 01/21/22 1807 01/21/22 1900 01/21/22 2045   BP:  167/79 155/69 130/81   Pulse: 98  73 75         Visual Acuity      ED Medications  Medications   heparin (porcine) injection 10,000 Units (has no administration in time range)   heparin (porcine) 25,000 units in 0 45% NaCl 250 mL infusion (premix) (has no administration in time range)   heparin (porcine) injection 10,000 Units (has no administration in time range)   heparin (porcine) injection 5,000 Units (has no administration in time range)   dexamethasone (DECADRON) injection 10 mg (10 mg Intravenous Given 1/21/22 1851)   iohexol (OMNIPAQUE) 350 MG/ML injection (SINGLE-DOSE) 90 mL (90 mL Intravenous Given 1/21/22 1944)       Diagnostic Studies  Results Reviewed     Procedure Component Value Units Date/Time    HS Troponin I 2hr [[id number]] Collected: 01/21/22 2045    Lab Status:  In process Specimen: Blood from Arm, Left Updated: 01/21/22 2048    APTT [[id number]]     Lab Status: No result Specimen: Blood     HS Troponin I 4hr [[id number]]     Lab Status: No result Specimen: Blood     D-Dimer [[id number]]  (Abnormal) Collected: 01/21/22 1844    Lab Status: Final result Specimen: Blood from Arm, Left Updated: 01/21/22 1922     D-Dimer, Quant >20 00 ug/ml FEU     HS Troponin 0hr (reflex protocol) [[id number]] Collected: 01/21/22 1844    Lab Status: Final result Specimen: Blood from Arm, Left Updated: 01/21/22 1917     hs TnI 0hr 83 ng/L     Comprehensive metabolic panel [[id number]] Collected: 01/21/22 1844    Lab Status: Final result Specimen: Blood from Arm, Left Updated: 01/21/22 1913     Sodium 142 mmol/L      Potassium 3 6 mmol/L      Chloride 104 mmol/L      CO2 31 mmol/L      ANION GAP 7 mmol/L BUN 18 mg/dL      Creatinine 0 75 mg/dL      Glucose 98 mg/dL      Calcium 9 1 mg/dL      AST 29 U/L      ALT 24 U/L      Alkaline Phosphatase 82 U/L      Total Protein 6 8 g/dL      Albumin 3 6 g/dL      Total Bilirubin 0 82 mg/dL      eGFR 88 ml/min/1 73sq m     Narrative:      [place] guidelines for Chronic Kidney Disease (CKD):     Stage 1 with normal or high GFR (GFR > 90 mL/min/1 73 square meters)    Stage 2 Mild CKD (GFR = 60-89 mL/min/1 73 square meters)    Stage 3A Moderate CKD (GFR = 45-59 mL/min/1 73 square meters)    Stage 3B Moderate CKD (GFR = 30-44 mL/min/1 73 square meters)    Stage 4 Severe CKD (GFR = 15-29 mL/min/1 73 square meters)    Stage 5 End Stage CKD (GFR <15 mL/min/1 73 square meters)  Note: GFR calculation is accurate only with a steady state creatinine    Lactic acid [[id number]]  (Normal) Collected: 01/21/22 1844    Lab Status: Final result Specimen: Blood from Arm, Left Updated: 01/21/22 1913     LACTIC ACID 1 0 mmol/L     Narrative:      Result may be elevated if tourniquet was used during collection      Protime-INR [[id number]]  (Normal) Collected: 01/21/22 1844    Lab Status: Final result Specimen: Blood from Arm, Left Updated: 01/21/22 1904     Protime 13 1 seconds      INR 1 00    APTT [[id number]]  (Normal) Collected: 01/21/22 1844    Lab Status: Final result Specimen: Blood from Arm, Left Updated: 01/21/22 1904     PTT 27 seconds     CBC and differential [[id number]] Collected: 01/21/22 1844    Lab Status: Final result Specimen: Blood from Arm, Left Updated: 01/21/22 1853     WBC 5 66 Thousand/uL      RBC 4 92 Million/uL      Hemoglobin 13 8 g/dL      Hematocrit 44 0 %      MCV 89 fL      MCH 28 0 pg      MCHC 31 4 g/dL      RDW 13 8 %      MPV 10 6 fL      Platelets 245 Thousands/uL      nRBC 0 /100 WBCs      Neutrophils Relative 64 %      Immat GRANS % 1 %      Lymphocytes Relative 23 %      Monocytes Relative 11 %      Eosinophils Relative 1 % Basophils Relative 0 %      Neutrophils Absolute 3 65 Thousands/µL      Immature Grans Absolute 0 03 Thousand/uL      Lymphocytes Absolute 1 28 Thousands/µL      Monocytes Absolute 0 63 Thousand/µL      Eosinophils Absolute 0 05 Thousand/µL      Basophils Absolute 0 02 Thousands/µL     Blood culture #1 [[id number]] Collected: 01/21/22 1844    Lab Status: In process Specimen: Blood from Arm, Left Updated: 01/21/22 1850    Blood culture #2 [[id number]] Collected: 01/21/22 1844    Lab Status: In process Specimen: Blood from Hand, Right Updated: 01/21/22 1850                 CTA ED chest PE Study   Final Result by Cesilia Calvo MD (01/21 2033)      Acute bilateral pulmonary emboli  Covid pneumonia  No right heart strain identified  Presumed mild mediastinal and hilar area reactive adenopathy  Cholecystectomy             I personally discussed this study with Jose Antonio Lowry on 1/21/2022 at 8:32 PM                      Workstation performed: [id number]         XR chest 1 view portable    (Results Pending)              Procedures  ECG 12 Lead Documentation Only    Date/Time: 1/21/2022 8:25 PM  Performed by: Brenden Beavers PA-C  Authorized by:  Brenden Beavers PA-C     ECG reviewed by me, the ED Provider: yes    Patient location:  ED  Previous ECG:     Previous ECG:  Compared to current    Similarity:  No change    Comparison to cardiac monitor: Yes    Interpretation:     Interpretation: normal    Rate:     ECG rate:  80    ECG rate assessment: normal    Rhythm:     Rhythm: sinus rhythm    Ectopy:     Ectopy: none    QRS:     QRS axis:  Normal  Conduction:     Conduction: normal    ST segments:     ST segments:  Normal  T waves:     T waves: normal    Comments:      No evidence of acute cardiac ischemia             ED Course                               SBIRT 20yo+      Most Recent Value   SBIRT (22 yo +)    In order to provide better care to our patients, we are screening all of our patients for alcohol and drug use  Would it be okay to ask you these screening questions? Yes Filed at: 2022   Initial Alcohol Screen: US AUDIT-C     1  How often do you have a drink containing alcohol? 0 Filed at: 2022 184   2  How many drinks containing alcohol do you have on a typical day you are drinking? 0 Filed at: 2022 184   3a  Male UNDER 65: How often do you have five or more drinks on one occasion? 0 Filed at: 2022 184   3b  FEMALE Any Age, or MALE 65+: How often do you have 4 or more drinks on one occassion? 0 Filed at: 2022   Audit-C Score 0 Filed at: 2022   BREONNA: How many times in the past year have you    Used an illegal drug or used a prescription medication for non-medical reasons? Never Filed at: 2022 184                    MDM  Number of Diagnoses or Management Options  Bilateral pulmonary embolism ([facility] [address] )  Pneumonia due to COVID-19 virus  Diagnosis management comments: Patient presented hypoxic with COVID-19  D-dimer greater than 20  CT shows bilateral pulmonary emboli without evidence of right heart strain  Discussed with critical care Dr Joesph Trejo that states patient is stable for  Patient is agreeable to admission  Will disposition patient per current [facility] Guidelines:  Updated 21    Low risk patient SpO2 ? 90% (rest/ambulation)  For High Risk patient and SpO2 >/= 92% (rest/ambulation)   Dispo: DC and call PCP within 24 hours    DC Meds:   Vitamin D3 2000 IU PO daily  Vit C 1g PO Q12  Multivitamin Daily   Consider Budesonide^ x14 days for patients age ?    72 or age > 48 with comorbidities  o 180 mcg Flexhaler 4 puffs BID   o Nebulized 750mcg BID    Consider fluvoxamine 100 mg BID x7, UNLESS   contraindicated (see below):  o any psychiatric comorbidities  o use of any SSRI, SNRI, TCA, or MAOI  o use of concomitant CY substrates¶  o QTc interval > 500 msec    Home Pulse Ox:  Recommend returning if O2 drops below above levels     Low risk patient SpO2 ? 90% (at rest) but < 90% (w/ ambulation)  High Risk patient* SpO2 ? 92% (at rest) but <92% (w/ ambulation)   Dispo: Consider admission - if severe sx or to f/u closely with pcp   Consider DC - if mild sx    DC Meds:  Vit D3 2000 IU PO Daily  Vit C 1g PO Q12  Multivitamin Daily  Consider Budesonide^ x14 days for patients age ? 72 or age > 48 with comorbidities  o 180 mcg Flexhaler 4 puffs BID   o Nebulized 750mcg BID    Consider fluvoxamine 100 mg BID x7, UNLESS   contraindicated (see below):  o any psychiatric comorbidities  o use of any SSRI, SNRI, TCA, or MAOI  o use of concomitant CY substrates¶  o QTc interval > 500 msec    Home Pulse Ox:  Recommend returning if O2 drops below above levels     Low risk patient SpO2 < 90%  High Risk patient* SpO2 < 92%   Dispo: Admit       **always call PCP in 24 hours if D/C  **High Risk Definition: age >71, BMI>35, immunocompromised, CKD or chronic heart/lung disease, pregnancy        Disposition  Final diagnoses:   Pneumonia due to COVID-19 virus   Bilateral pulmonary embolism ([facility] [address] )     Time reflects when diagnosis was documented in both MDM as applicable and the Disposition within this note     Time User Action Codes Description Comment    2022  8:52 PM Jose Antonella Add [U07 1,  J12 82] Pneumonia due to COVID-19 virus     2022  8:52 PM Jose Antonella Add [I26 99] Bilateral pulmonary embolism [facility])       ED Disposition     ED Disposition Condition Date/Time Comment    Admit Stable   8:52 PM Case was discussed with Qing Fernandez and the patient's admission status was agreed to be Admission Status: inpatient status to the service of Dr Campos Mcqueen  Follow-up Information    None         Patient's Medications   Discharge Prescriptions    No medications on file       No discharge procedures on file      PDMP Review       Value Time User    PDMP Reviewed  Yes 10/29/2020 11:43 AM Jeffrey Piedra MD ED Provider  Electronically Signed by           Burgess Sylvie PA-C  01/21/22 2053

## 2022-01-22 NOTE — ASSESSMENT & PLAN NOTE
· Admit to telemetry  · Obtain baseline COVID labs and trend as indicated  · Placed on O2 and respiratory protocol with incentive spirometry  · Begin remdesivir  · Continue pre-hospital Proventil 90 mcg 2 puffs q 6 hours p r n    · Will give Rocephin 1 g IV daily and Vibramycin 100 mg p o  q 12 hours  · Begin Decadron 6 mg IV daily

## 2022-01-22 NOTE — ED NOTES
Patient sleeping at this time RN will continue to monitor        Calin Nunez, BELGICA  01/22/22 2662

## 2022-01-23 LAB
ALBUMIN SERPL BCP-MCNC: 3.5 G/DL (ref 3.5–5)
ALP SERPL-CCNC: 87 U/L (ref 34–104)
ALT SERPL W P-5'-P-CCNC: 35 U/L (ref 7–52)
ANION GAP SERPL CALCULATED.3IONS-SCNC: 7 MMOL/L (ref 4–13)
APTT PPP: 27 SECONDS (ref 23–37)
APTT PPP: 65 SECONDS (ref 23–37)
APTT PPP: 86 SECONDS (ref 23–37)
AST SERPL W P-5'-P-CCNC: 28 U/L (ref 13–39)
BILIRUB SERPL-MCNC: 0.37 MG/DL (ref 0.2–1)
BUN SERPL-MCNC: 22 MG/DL (ref 5–25)
CALCIUM SERPL-MCNC: 8.8 MG/DL (ref 8.4–10.2)
CHLORIDE SERPL-SCNC: 107 MMOL/L (ref 96–108)
CO2 SERPL-SCNC: 26 MMOL/L (ref 21–32)
CREAT SERPL-MCNC: 0.66 MG/DL (ref 0.6–1.3)
CRP SERPL QL: 23.4 MG/L
ERYTHROCYTE [DISTWIDTH] IN BLOOD BY AUTOMATED COUNT: 14.1 % (ref 11.6–15.1)
GFR SERPL CREATININE-BSD FRML MDRD: 98 ML/MIN/1.73SQ M
GLUCOSE SERPL-MCNC: 155 MG/DL (ref 65–140)
HCT VFR BLD AUTO: 40.9 % (ref 34.8–46.1)
HGB BLD-MCNC: 13.1 G/DL (ref 11.5–15.4)
MCH RBC QN AUTO: 28.4 PG (ref 26.8–34.3)
MCHC RBC AUTO-ENTMCNC: 32 G/DL (ref 31.4–37.4)
MCV RBC AUTO: 89 FL (ref 82–98)
PLATELET # BLD AUTO: 243 THOUSANDS/UL (ref 149–390)
PMV BLD AUTO: 11 FL (ref 8.9–12.7)
POTASSIUM SERPL-SCNC: 4.3 MMOL/L (ref 3.5–5.3)
PROT SERPL-MCNC: 6.6 G/DL (ref 6.4–8.4)
RBC # BLD AUTO: 4.62 MILLION/UL (ref 3.81–5.12)
SODIUM SERPL-SCNC: 140 MMOL/L (ref 135–147)
WBC # BLD AUTO: 8.07 THOUSAND/UL (ref 4.31–10.16)

## 2022-01-23 PROCEDURE — 99232 SBSQ HOSP IP/OBS MODERATE 35: CPT | Performed by: STUDENT IN AN ORGANIZED HEALTH CARE EDUCATION/TRAINING PROGRAM

## 2022-01-23 PROCEDURE — XW0DXM6 INTRODUCTION OF BARICITINIB INTO MOUTH AND PHARYNX, EXTERNAL APPROACH, NEW TECHNOLOGY GROUP 6: ICD-10-PCS | Performed by: STUDENT IN AN ORGANIZED HEALTH CARE EDUCATION/TRAINING PROGRAM

## 2022-01-23 PROCEDURE — 86140 C-REACTIVE PROTEIN: CPT | Performed by: STUDENT IN AN ORGANIZED HEALTH CARE EDUCATION/TRAINING PROGRAM

## 2022-01-23 PROCEDURE — 80053 COMPREHEN METABOLIC PANEL: CPT | Performed by: STUDENT IN AN ORGANIZED HEALTH CARE EDUCATION/TRAINING PROGRAM

## 2022-01-23 PROCEDURE — 85730 THROMBOPLASTIN TIME PARTIAL: CPT | Performed by: INTERNAL MEDICINE

## 2022-01-23 PROCEDURE — 85027 COMPLETE CBC AUTOMATED: CPT | Performed by: STUDENT IN AN ORGANIZED HEALTH CARE EDUCATION/TRAINING PROGRAM

## 2022-01-23 RX ADMIN — CEFTRIAXONE 1000 MG: 1 INJECTION, SOLUTION INTRAVENOUS at 06:39

## 2022-01-23 RX ADMIN — DEXAMETHASONE SODIUM PHOSPHATE 6 MG: 4 INJECTION, SOLUTION INTRA-ARTICULAR; INTRALESIONAL; INTRAMUSCULAR; INTRAVENOUS; SOFT TISSUE at 20:33

## 2022-01-23 RX ADMIN — HEPARIN SODIUM 18 UNITS/KG/HR: 10000 INJECTION, SOLUTION INTRAVENOUS at 20:33

## 2022-01-23 RX ADMIN — BARICITINIB 4 MG: 2 TABLET, FILM COATED ORAL at 12:46

## 2022-01-23 RX ADMIN — B-COMPLEX W/ C & FOLIC ACID TAB 1 TABLET: TAB at 09:35

## 2022-01-23 RX ADMIN — HEPARIN SODIUM 10000 UNITS: 1000 INJECTION, SOLUTION INTRAVENOUS; SUBCUTANEOUS at 06:35

## 2022-01-23 RX ADMIN — DOXYCYCLINE 100 MG: 100 CAPSULE ORAL at 09:35

## 2022-01-23 RX ADMIN — HEPARIN SODIUM 18 UNITS/KG/HR: 10000 INJECTION, SOLUTION INTRAVENOUS at 09:56

## 2022-01-23 RX ADMIN — REMDESIVIR 100 MG: 100 INJECTION, POWDER, LYOPHILIZED, FOR SOLUTION INTRAVENOUS at 22:00

## 2022-01-23 RX ADMIN — Medication 1000 UNITS: at 09:35

## 2022-01-23 NOTE — CASE MANAGEMENT
Case Management Assessment & Discharge Planning Note    Patient name Jarret Trejo  Location /-53 MRN [id number]  : 1965 Date 2022       Current Admission Date: 2022  Current Admission Diagnosis:Pneumonia due to COVID-19 virus   Patient Active Problem List    Diagnosis Date Noted    Pneumonia due to COVID-19 virus 2022    Bilateral pulmonary embolism ([facility] [address] ) 2022    Hypoxia 2022    Mixed hyperlipidemia 2021    Vitamin D deficiency 2021    S/P total abdominal hysterectomy and bilateral salpingo-oophorectomy 2021    Rotator cuff arthropathy of right shoulder 2021    Rotator cuff tear arthropathy of right shoulder 2021    Left ankle pain 10/29/2020    Anxiety 10/29/2020    Grieving 10/29/2020    Osteoarthritis of multiple joints 10/29/2020    Traumatic complete tear of left rotator cuff 2020    Chronic left shoulder pain 2020    History of endometrial cancer 2019    Chronic midline low back pain without sciatica 2018    Nasal congestion 2016    Degenerative arthritis of cervical spine 2016    Lung nodule 2016    Obesity due to excess calories 2016    Pulmonary nodule 2016    Hyperesthesia 2015    Migraine headache 2015    Obstructive sleep apnea 2015    Frequent headaches 10/28/2014    Hypothyroidism 10/28/2014    Paresthesias 10/28/2014    Paroxysmal atrial fibrillation ([facility] [address] ) 10/28/2014      LOS (days): 2  Geometric Mean LOS (GMLOS) (days):   Days to GMLOS:     OBJECTIVE:    Risk of Unplanned Readmission Score: 7         Current admission status: Inpatient       Preferred Pharmacy:   [id number], [address] [place] [address] [address]  [address]   [place] Edith HOFFMAN [address]  Phone: [phone] Fax: [phone]    Primary Care Provider: Andrés Feliz MD    Primary Insurance: [employer] CROSS  Secondary Insurance:     ASSESSMENT:  Active Health Care Agents     [place], [address] - Child   Primary Phone: [phone] (Home)               Advance Directives  Does patient have a [address]?: No  Was patient offered paperwork?: Yes (Patient declined paperwork )  Does patient currently have a Health Care decision maker?: Yes, please see Health Care Proxy section  Does patient have Advance Directives?: No  Was patient offered paperwork?: Yes (Patient declined paperwork )  Primary Contact: Patient's Daughter    Readmission Root Cause  30 Day Readmission: No    Patient Information  Admitted from[de-identified] Home  Mental Status: Alert  During Assessment patient was accompanied by: Not accompanied during assessment  Assessment information provided by[de-identified] Patient (Completed via phone)  Primary Caregiver: Self  Support Systems: Spouse/significant [address] [facility] of Residence: [address] [place] [address] Avenue do you live in?: Via [employer] entry access options   Select all that apply : Stairs  Number of steps to enter home : 7  Do the steps have railings?: Yes  Type of Current Residence: [address] [place] [address]  Upon entering residence, is there a bedroom on the main floor (no further steps)?: Yes  Upon entering residence, is there a bathroom on the main floor (no further steps)?: Yes  In the last 12 months, was there a time when you were not able to pay the mortgage or rent on time?: No  In the last 12 months, how many places have you lived?: 1  In the last 12 months, was there a time when you did not have a steady place to sleep or slept in a shelter (including now)?: No  Homeless/housing insecurity resource given?: N/A  Living Arrangements: Lives w/ Spouse/significant other,Lives w/ Daughter  Is patient a ?: No    Activities of Daily Living Prior to Admission  Functional Status: Independent  Completes ADLs independently?: Yes  Ambulates independently?: Yes  Does patient use assisted devices?: No  Does patient currently own DME?: No  Does patient have a history of Outpatient Therapy (PT/OT)?: No  Does the patient have a history of Short-Term Rehab?: No  Does patient have a history of HHC?: No  Does patient currently have [facility]?: No    Patient Information Continued  Income Source: Employed  Does patient have prescription coverage?: Yes  Within the past 12 months, you worried that your food would run out before you got the money to buy more : Never true  Within the past 12 months, the food you bought just didnt last and you didnt have money to get more : Never true  Food insecurity resource given?: N/A  Does patient receive dialysis treatments?: No  Does patient have a history of substance abuse?: No  Does patient have a history of Mental Health Diagnosis?: No    Means of Transportation  Means of Transport to Appts[de-identified] Drives Self  In the past 12 months, has lack of transportation kept you from medical appointments or from getting medications?: No  In the past 12 months, has lack of transportation kept you from meetings, work, or from getting things needed for daily living?: No  Was application for public transport provided?: N/A    DISCHARGE DETAILS:    Discharge planning discussed with[de-identified] Patient  Freedom of Choice: Yes  Comments - Freedom of Choice: Patient denied any needs at this time and is not interested in [facility]  Patient reported that she may need oxygen at discharge  CM to arrange home O2 if needed    CM contacted family/caregiver?: No- see comments (Patient declined phone call to family at this time )  Were Treatment Team discharge recommendations reviewed with patient/caregiver?: Yes  Did patient/caregiver verbalize understanding of patient care needs?: Yes  Were patient/caregiver advised of the risks associated with not following Treatment Team discharge recommendations?: Yes    [address]         Is the patient interested in [facility] at discharge?: No    DME Referral Provided  Referral made for DME?: No    Would you like to participate in our Segment service program?  : No - Declined    Treatment Team Recommendation: Home  Discharge Destination Plan[de-identified] Home  Transport at Discharge : Family

## 2022-01-23 NOTE — ASSESSMENT & PLAN NOTE
· Continue heparin drip  · Obtain echocardiogram and venous duplex   · Placed on O2 and respiratory protocol

## 2022-01-23 NOTE — PROGRESS NOTES
[facility]  Progress Note - Lazarus Slim 1965, 61 y o  female MRN: [id number]  Unit/Bed#: -01 Encounter: [id number]  Primary Care Provider: Tanya Haney MD   Date and time admitted to hospital: 1/21/2022  6:09 PM    * Pneumonia due to COVID-19 virus  Assessment & Plan    · Placed on O2 and respiratory protocol with incentive spirometry  · Continue pre-hospital Proventil 90 mcg 2 puffs q 6 hours p r n  · Continue Rocephin 1 g IV daily and Vibramycin 100 mg p o  q 12 hours  · Continue Decadron day 3 of 10  · Continue remdesivir day 3 of 5  · Baricitinib day 1 of 14  · Incentive spirometry  · Continue to encourage self pronating    Hypoxia  Assessment & Plan  · Likely multifactorial given her COVID-19 infection as well as bilateral pulmonary embolism  · Being treated as per above    Bilateral pulmonary embolism (HCC)  Assessment & Plan  · Continue heparin drip  · Obtain echocardiogram and venous duplex   · Placed on O2 and respiratory protocol    Vitamin D deficiency  Assessment & Plan  Continue pre-hospital vitamin D3 1000 units p o  daily    Obstructive sleep apnea  Assessment & Plan  Will place on O2 and respiratory protocol    Obesity due to excess calories  Assessment & Plan  Encourage healthy weight loss and supportive care        VTE Pharmacologic Prophylaxis: VTE Score: 9 High Risk (Score >/= 5) - Pharmacological DVT Prophylaxis Ordered: heparin drip  Sequential Compression Devices Ordered  Patient Centered Rounds: I performed bedside rounds with nursing staff today  Discussions with Specialists or Other Care Team Provider: none    Education and Discussions with Family / Patient:  Patient, all questions answered    Time Spent for Care: 45 minutes  More than 50% of total time spent on counseling and coordination of care as described above      Current Length of Stay: 2 day(s)  Current Patient Status: Inpatient   Certification Statement: The patient will continue to require additional inpatient hospital stay due to COVID-19 pneumonia requiring supplemental oxygenation  Discharge Plan: Anticipate discharge in >72 hrs to home  Code Status: Level 1 - Full Code    Subjective:   Patient seen examined the bedside  Patient continues to require 4 L nasal cannula  Patient states she feels slightly better compared to yesterday  Objective:     Vitals:   Temp (24hrs), Av 3 °F (36 3 °C), Min:97 2 °F (36 2 °C), Max:97 3 °F (36 3 °C)    Temp:  [97 2 °F (36 2 °C)-97 3 °F (36 3 °C)] 97 2 °F (36 2 °C)  HR:  [60-94] 69  Resp:  [18] 18  BP: (126-147)/(58-95) 126/71  SpO2:  [90 %-95 %] 93 %  There is no height or weight on file to calculate BMI  Input and Output Summary (last 24 hours): Intake/Output Summary (Last 24 hours) at 2022 1148  Last data filed at 2022 0900  Gross per 24 hour   Intake 240 ml   Output --   Net 240 ml       Physical Exam:   Physical Exam  Constitutional:       Appearance: She is obese  Cardiovascular:      Rate and Rhythm: Normal rate and regular rhythm  Pulses: Normal pulses  Heart sounds: Normal heart sounds  Pulmonary:      Effort: Pulmonary effort is normal       Comments: Coarse breath sounds throughout  4 L nasal cannula  Abdominal:      General: Abdomen is flat  Bowel sounds are normal       Palpations: Abdomen is soft  Musculoskeletal:         General: Normal range of motion  Cervical back: Normal range of motion  Skin:     General: Skin is warm and dry  Neurological:      General: No focal deficit present  Mental Status: She is alert and oriented to person, place, and time  Mental status is at baseline  Psychiatric:         Mood and Affect: Mood normal          Behavior: Behavior normal          Thought Content:  Thought content normal          Judgment: Judgment normal           Additional Data:     Labs:  Results from last 7 days   Lab Units 22  0519 22  0417 22   WBC Thousand/uL 8 07   < > 4 08*   HEMOGLOBIN g/dL 13 1   < > 14 0   HEMATOCRIT % 40 9   < > 43 9   PLATELETS Thousands/uL 243   < > 209   NEUTROS PCT %  --   --  79*   LYMPHS PCT %  --   --  17   MONOS PCT %  --   --  3*   EOS PCT %  --   --  0    < > = values in this interval not displayed  Results from last 7 days   Lab Units 01/23/22  0519   SODIUM mmol/L 140   POTASSIUM mmol/L 4 3   CHLORIDE mmol/L 107   CO2 mmol/L 26   BUN mg/dL 22   CREATININE mg/dL 0 66   ANION GAP mmol/L 7   CALCIUM mg/dL 8 8   ALBUMIN g/dL 3 5   TOTAL BILIRUBIN mg/dL 0 37   ALK PHOS U/L 87   ALT U/L 35   AST U/L 28   GLUCOSE RANDOM mg/dL 155*     Results from last 7 days   Lab Units 01/21/22  1844   INR  1 00             Results from last 7 days   Lab Units 01/22/22  0417 01/22/22  0229 01/21/22  1844   LACTIC ACID mmol/L  --  1 1 1 0   PROCALCITONIN ng/ml <0 05  --  <0 05       Lines/Drains:  Invasive Devices  Report    Peripheral Intravenous Line            Peripheral IV 01/21/22 Left Antecubital 2 days    Peripheral IV 01/21/22 Left Hand 1 day                      Imaging: No pertinent imaging reviewed  Recent Cultures (last 7 days):   Results from last 7 days   Lab Units 01/21/22  1844   BLOOD CULTURE  No Growth at 24 hrs  No Growth at 24 hrs         Last 24 Hours Medication List:   Current Facility-Administered Medications   Medication Dose Route Frequency Provider Last Rate    acetaminophen  650 mg Oral Q4H PRN Heidi Ivey PA-C      albuterol  2 puff Inhalation Q6H PRN Heidi Ivey PA-C      Baricitinib  4 mg Oral Q24H Jbnicholas Castillo, DO      cefTRIAXone  1,000 mg Intravenous Q24H Heidi Ivey PA-C 1,000 mg (01/23/22 0570)    cholecalciferol  1,000 Units Oral Daily Heidi Ivey PA-C      dexamethasone  6 mg Intravenous Q24H Heidi Ivey PA-C      doxycycline hyclate  100 mg Oral Q12H [address] Heidi Ivey PA-C      heparin (porcine)  3-30 Units/kg/hr (Order-Specific) Intravenous Titrated Javier Valles PA-C 18 Units/kg/hr (01/23/22 0956)    heparin (porcine)  10,000 Units Intravenous Q1H PRN Ramakrishna Bernal PA-C      heparin (porcine)  5,000 Units Intravenous Q1H PRN Ramakrishna Bernal PA-C      [facility]   Oral Daily Jacy Semen, [place]      LORazepam  0 5 mg Oral BID PRN Jacy Semen, YAHIR      multivitamin stress formula  1 tablet Oral Daily Purdy Semen, YAHIR      ondansetron  4 mg Intravenous Q6H PRN Jacy Semen, YAHIR      remdesivir  100 mg Intravenous Q24H Jacy Semen, YAHIR          Today, Patient Was Seen By: Micheal Baxter DO    **Please Note: This note may have been constructed using a voice recognition system  **

## 2022-01-23 NOTE — ASSESSMENT & PLAN NOTE
· Placed on O2 and respiratory protocol with incentive spirometry  · Continue pre-hospital Proventil 90 mcg 2 puffs q 6 hours p r n    · Continue Rocephin 1 g IV daily and Vibramycin 100 mg p o  q 12 hours  · Continue Decadron day 3 of 10  · Continue remdesivir day 3 of 5  · Baricitinib day 1 of 14  · Incentive spirometry  · Continue to encourage self pronating

## 2022-01-24 ENCOUNTER — APPOINTMENT (INPATIENT)
Dept: NON INVASIVE DIAGNOSTICS | Facility: HOSPITAL | Age: 57
DRG: 177 | End: 2022-01-24
Payer: COMMERCIAL

## 2022-01-24 LAB
ALBUMIN SERPL BCP-MCNC: 3.5 G/DL (ref 3.5–5)
ALP SERPL-CCNC: 84 U/L (ref 34–104)
ALT SERPL W P-5'-P-CCNC: 35 U/L (ref 7–52)
ANION GAP SERPL CALCULATED.3IONS-SCNC: 9 MMOL/L (ref 4–13)
AORTIC ROOT: 3 CM
AORTIC VALVE MEAN VELOCITY: 10.1 M/S
APICAL FOUR CHAMBER EJECTION FRACTION: 61 %
APTT PPP: 156 SECONDS (ref 23–37)
APTT PPP: 75 SECONDS (ref 23–37)
APTT PPP: 82 SECONDS (ref 23–37)
AST SERPL W P-5'-P-CCNC: 26 U/L (ref 13–39)
AV LVOT MEAN GRADIENT: 4 MMHG
AV LVOT PEAK GRADIENT: 7 MMHG
AV MEAN GRADIENT: 5 MMHG
AV PEAK GRADIENT: 9 MMHG
BILIRUB SERPL-MCNC: 0.41 MG/DL (ref 0.2–1)
BUN SERPL-MCNC: 21 MG/DL (ref 5–25)
CALCIUM SERPL-MCNC: 9 MG/DL (ref 8.4–10.2)
CHLORIDE SERPL-SCNC: 106 MMOL/L (ref 96–108)
CO2 SERPL-SCNC: 25 MMOL/L (ref 21–32)
CREAT SERPL-MCNC: 0.62 MG/DL (ref 0.6–1.3)
CRP SERPL QL: 12.5 MG/L
DOP CALC AO PEAK VEL: 1.48 M/S
DOP CALC AO VTI: 32.03 CM
DOP CALC LVOT PEAK VEL VTI: 29.49 CM
DOP CALC LVOT PEAK VEL: 1.29 M/S
DOP CALC RVOT PEAK VEL: 0.87 M/S
DOP CALC RVOT VTI: 19.09 CM
E WAVE DECELERATION TIME: 158 MS
E/A RATIO: 1.14
ERYTHROCYTE [DISTWIDTH] IN BLOOD BY AUTOMATED COUNT: 14.4 % (ref 11.6–15.1)
FRACTIONAL SHORTENING: 37 (ref 28–44)
GFR SERPL CREATININE-BSD FRML MDRD: 100 ML/MIN/1.73SQ M
GLUCOSE SERPL-MCNC: 148 MG/DL (ref 65–140)
HCT VFR BLD AUTO: 42.1 % (ref 34.8–46.1)
HGB BLD-MCNC: 13.2 G/DL (ref 11.5–15.4)
INTERVENTRICULAR SEPTUM IN DIASTOLE (PARASTERNAL SHORT AXIS VIEW): 0.9 CM
LEFT ATRIUM AREA SYSTOLE SINGLE PLANE A4C: 14.1 CM2
LEFT ATRIUM SIZE: 3.8 CM
LEFT INTERNAL DIMENSION IN SYSTOLE: 3.4 CM (ref 2.1–4)
LEFT VENTRICULAR INTERNAL DIMENSION IN DIASTOLE: 5.4 CM (ref 13.83–20.63)
LEFT VENTRICULAR POSTERIOR WALL IN END DIASTOLE: 0.8 CM
LEFT VENTRICULAR STROKE VOLUME: 96 ML
MCH RBC QN AUTO: 27.9 PG (ref 26.8–34.3)
MCHC RBC AUTO-ENTMCNC: 31.4 G/DL (ref 31.4–37.4)
MCV RBC AUTO: 89 FL (ref 82–98)
MV E'TISSUE VEL-SEP: 13 CM/S
MV PEAK A VEL: 0.77 M/S
MV PEAK E VEL: 88 CM/S
PLATELET # BLD AUTO: 273 THOUSANDS/UL (ref 149–390)
PMV BLD AUTO: 11.2 FL (ref 8.9–12.7)
POTASSIUM SERPL-SCNC: 4.4 MMOL/L (ref 3.5–5.3)
PROT SERPL-MCNC: 6.7 G/DL (ref 6.4–8.4)
PV MEAN GRADIENT: 3 MMHG
PV MEAN VELOCITY: 86 CM/S
PV PEAK GRADIENT: 8 MMHG
PV VTI: 26.55 CM
RBC # BLD AUTO: 4.73 MILLION/UL (ref 3.81–5.12)
RIGHT ATRIUM AREA SYSTOLE A4C: 15 CM2
RIGHT VENTRICLE ID DIMENSION: 3.4 CM
SL CV LV EF: 60
SL CV PED ECHO LEFT VENTRICLE DIASTOLIC VOLUME (MOD BIPLANE) 2D: 144 ML
SL CV PED ECHO LEFT VENTRICLE SYSTOLIC VOLUME (MOD BIPLANE) 2D: 48 ML
SL CV RVOT MAX GRADIENT: 3 MMHG
SL CV RVOT MEAN GRADIENT: 2 MMHG
SL CV RVOT VMEAN: 0.6 M/S
SODIUM SERPL-SCNC: 140 MMOL/L (ref 135–147)
TR MAX PG: 26 MMHG
TRICUSPID VALVE PEAK REGURGITATION VELOCITY: 2.56 M/S
WBC # BLD AUTO: 7.74 THOUSAND/UL (ref 4.31–10.16)

## 2022-01-24 PROCEDURE — 93306 TTE W/DOPPLER COMPLETE: CPT | Performed by: INTERNAL MEDICINE

## 2022-01-24 PROCEDURE — 93970 EXTREMITY STUDY: CPT | Performed by: SURGERY

## 2022-01-24 PROCEDURE — 86140 C-REACTIVE PROTEIN: CPT | Performed by: STUDENT IN AN ORGANIZED HEALTH CARE EDUCATION/TRAINING PROGRAM

## 2022-01-24 PROCEDURE — 85730 THROMBOPLASTIN TIME PARTIAL: CPT | Performed by: INTERNAL MEDICINE

## 2022-01-24 PROCEDURE — 85027 COMPLETE CBC AUTOMATED: CPT | Performed by: STUDENT IN AN ORGANIZED HEALTH CARE EDUCATION/TRAINING PROGRAM

## 2022-01-24 PROCEDURE — 99232 SBSQ HOSP IP/OBS MODERATE 35: CPT | Performed by: INTERNAL MEDICINE

## 2022-01-24 PROCEDURE — 80053 COMPREHEN METABOLIC PANEL: CPT | Performed by: STUDENT IN AN ORGANIZED HEALTH CARE EDUCATION/TRAINING PROGRAM

## 2022-01-24 PROCEDURE — 93306 TTE W/DOPPLER COMPLETE: CPT

## 2022-01-24 PROCEDURE — 93970 EXTREMITY STUDY: CPT

## 2022-01-24 RX ADMIN — REMDESIVIR 100 MG: 100 INJECTION, POWDER, LYOPHILIZED, FOR SOLUTION INTRAVENOUS at 21:52

## 2022-01-24 RX ADMIN — BARICITINIB 4 MG: 2 TABLET, FILM COATED ORAL at 12:00

## 2022-01-24 RX ADMIN — DEXAMETHASONE SODIUM PHOSPHATE 6 MG: 4 INJECTION, SOLUTION INTRA-ARTICULAR; INTRALESIONAL; INTRAMUSCULAR; INTRAVENOUS; SOFT TISSUE at 21:52

## 2022-01-24 RX ADMIN — HEPARIN SODIUM 15 UNITS/KG/HR: 10000 INJECTION, SOLUTION INTRAVENOUS at 12:00

## 2022-01-24 RX ADMIN — B-COMPLEX W/ C & FOLIC ACID TAB 1 TABLET: TAB at 08:50

## 2022-01-24 RX ADMIN — Medication 1000 UNITS: at 08:50

## 2022-01-24 NOTE — UTILIZATION REVIEW
Inpatient Admission Authorization Request   NOTIFICATION OF INPATIENT ADMISSION/INPATIENT AUTHORIZATION REQUEST   SERVICING FACILITY:   [facility]  [address] [place] [address], [facility], [address]  Tax ID: [id number]  NPI: [id number]  Place of Service: Inpatient [facility]  [address]  Place of Service Code: 24     ATTENDING PROVIDER:  Attending Name and NPI#: Radha Andressa [[id number]]  Address: [address] [place] [address], [facility], [address]  Phone: [phone]     UTILIZATION REVIEW CONTACT:  Vinod Dunlap, Utilization Review Supervisor  Network Utilization Review Department  Phone: [phone]  Fax [phone]  Email: [email or web address]     PHYSICIAN ADVISORY SERVICES:  FOR PHOV-WM-VZQT REVIEW - MEDICAL NECESSITY DENIAL  Phone: [phone]  Fax: [phone]  Email: [email or web address]     TYPE OF REQUEST:  Inpatient Status     ADMISSION INFORMATION:  ADMISSION DATE/TIME: 1/21/22  8:53 PM  PATIENT DIAGNOSIS CODE/DESCRIPTION:  SOB (shortness of breath) [R06 02]  Bilateral pulmonary embolism ([address] ) [I26 99]  Pneumonia due to COVID-19 virus [U07 1, J12 82]  DISCHARGE DATE/TIME: No discharge date for patient encounter  DISCHARGE DISPOSITION (IF DISCHARGED): Home/Self Care     IMPORTANT INFORMATION:  Please contact the Vinod Dunlap directly with any questions or concerns regarding this request  Department voicemails are confidential     Send requests for admission clinical reviews, concurrent reviews, approvals, and administrative denials due to lack of clinical to fax [phone]

## 2022-01-24 NOTE — PLAN OF CARE
Problem: INFECTION - ADULT  Goal: Absence of fever/infection during neutropenic period  Description: INTERVENTIONS:  - Monitor WBC    Outcome: Progressing     Problem: SAFETY ADULT  Goal: Maintain or return to baseline ADL function  Description: INTERVENTIONS:  -  Assess patient's ability to carry out ADLs; assess patient's baseline for ADL function and identify physical deficits which impact ability to perform ADLs (bathing, care of mouth/teeth, toileting, grooming, dressing, etc )  - Assess/evaluate cause of self-care deficits   - Assess range of motion  - Assess patient's mobility; develop plan if impaired  - Assess patient's need for assistive devices and provide as appropriate  - Encourage maximum independence but intervene and supervise when necessary  - Involve family in performance of ADLs  - Assess for home care needs following discharge   - Consider OT consult to assist with ADL evaluation and planning for discharge  - Provide patient education as appropriate  Outcome: Progressing  Goal: Maintains/Returns to pre admission functional level  Description: INTERVENTIONS:  - Perform BMAT or MOVE assessment daily    - Set and communicate daily mobility goal to care team and patient/family/caregiver  - Collaborate with rehabilitation services on mobility goals if consulted  - Perform Range of Motion  times a day  - Reposition patient every  hours    - Dangle patient  times a day  - Stand patient  times a day  - Ambulate patient  times a day  - Out of bed to chair  times a day   - Out of bed for meals times a day  - Out of bed for toileting  - Record patient progress and toleration of activity level   Outcome: Progressing     Problem: DISCHARGE PLANNING  Goal: Discharge to home or other facility with appropriate resources  Description: INTERVENTIONS:  - Identify barriers to discharge w/patient and caregiver  - Arrange for needed discharge resources and transportation as appropriate  - Identify discharge learning needs (meds, wound care, etc )  - Arrange for interpretive services to assist at discharge as needed  - Refer to Case Management Department for coordinating discharge planning if the patient needs post-hospital services based on physician/advanced practitioner order or complex needs related to functional status, cognitive ability, or social support system  Outcome: Progressing     Problem: Knowledge Deficit  Goal: Patient/family/caregiver demonstrates understanding of disease process, treatment plan, medications, and discharge instructions  Description: Complete learning assessment and assess knowledge base    Interventions:  - Provide teaching at level of understanding  - Provide teaching via preferred learning methods  Outcome: Progressing     Problem: RESPIRATORY - ADULT  Goal: Achieves optimal ventilation and oxygenation  Description: INTERVENTIONS:  - Assess for changes in respiratory status  - Assess for changes in mentation and behavior  - Position to facilitate oxygenation and minimize respiratory effort  - Oxygen administered by appropriate delivery if ordered  - Initiate smoking cessation education as indicated  - Encourage broncho-pulmonary hygiene including cough, deep breathe, Incentive Spirometry  - Assess the need for suctioning and aspirate as needed  - Assess and instruct to report SOB or any respiratory difficulty  - Respiratory Therapy support as indicated  Outcome: Progressing

## 2022-01-24 NOTE — ASSESSMENT & PLAN NOTE
· Continue heparin drip  · Echo showed  Left ventricular cavity size is normal  The left ventricular ejection fraction is 60%  Systolic function is normal  Wall motion is normal  Diastolic function is normal for age  · Right Ventricle: Right ventricular cavity size is mildly dilated   Systolic function is low normal   · Follow venous duplex  · Placed on O2 and respiratory protocol

## 2022-01-24 NOTE — ASSESSMENT & PLAN NOTE
· Patient is currently on 4 L oxygen via NC  · Placed on O2 and respiratory protocol with incentive spirometry  · Continue pre-hospital Proventil 90 mcg 2 puffs q 6 hours p r n    · Continue Rocephin 1 g IV daily and Vibramycin 100 mg p o  q 12 hours  · Continue Decadron day 4 of 10  · Continue remdesivir day 4 of 5  · Baricitinib day 2 of 14  · Incentive spirometry  · Continue to encourage self pronating

## 2022-01-24 NOTE — PROGRESS NOTES
[facility]  Progress Note - Ruddy Rucker 1965, 61 y o  female MRN: [id number]  Unit/Bed#: -01 Encounter: [id number]  Primary Care Provider: Cj Khalil MD   Date and time admitted to hospital: 1/21/2022  6:09 PM    * Pneumonia due to COVID-19 virus  Assessment & Plan  · Patient is currently on 4 L oxygen via NC  · Placed on O2 and respiratory protocol with incentive spirometry  · Continue pre-hospital Proventil 90 mcg 2 puffs q 6 hours p r n  · Continue Rocephin 1 g IV daily and Vibramycin 100 mg p o  q 12 hours  · Continue Decadron day 4 of 10  · Continue remdesivir day 4 of 5  · Baricitinib day 2 of 14  · Incentive spirometry  · Continue to encourage self pronating    Hypoxia  Assessment & Plan  · Likely multifactorial given her COVID-19 infection as well as bilateral pulmonary embolism  · Being treated as per above    Bilateral pulmonary embolism (HCC)  Assessment & Plan  · Continue heparin drip  · Echo showed  Left ventricular cavity size is normal  The left ventricular ejection fraction is 60%  Systolic function is normal  Wall motion is normal  Diastolic function is normal for age  · Right Ventricle: Right ventricular cavity size is mildly dilated  Systolic function is low normal   · Follow venous duplex  · Placed on O2 and respiratory protocol    Vitamin D deficiency  Assessment & Plan  · Continue pre-hospital vitamin D3 1000 units p o  daily    Obstructive sleep apnea  Assessment & Plan  Will place on O2 and respiratory protocol    Obesity due to excess calories  Assessment & Plan  Encourage healthy weight loss and supportive care      VTE Pharmacologic Prophylaxis:   Pharmacologic: Heparin Drip  Mechanical VTE Prophylaxis in Place: Yes    Patient Centered Rounds: I have performed bedside rounds with nursing staff today      Discussions with Specialists or Other Care Team Provider:  Discussed with Case Management, nurse    Education and Discussions with Family / Patient:  Discussed with patient    Time Spent for Care: 45 minutes  More than 50% of total time spent on counseling and coordination of care as described above  Current Length of Stay: 3 day(s)    Current Patient Status: Inpatient   Certification Statement: The patient will continue to require additional inpatient hospital stay due to COVID pneumonia    Discharge Plan / Estimated Discharge Date:  Pending      Code Status: Level 1 - Full Code      Subjective:   Patient was seen and examined at bedside  The patient said she gets easily winded  She has occasional cough  She denies any chest pain, palpitation, N/V, abd pain  Objective:     Vitals:   Temp (24hrs), Av 2 °F (36 8 °C), Min:98 °F (36 7 °C), Max:98 4 °F (36 9 °C)    Temp:  [98 °F (36 7 °C)-98 4 °F (36 9 °C)] 98 °F (36 7 °C)  HR:  [60-80] 80  Resp:  [22] 22  BP: (115-131)/(64-75) 120/70  SpO2:  [94 %-95 %] 95 %  Body mass index is 45 73 kg/m²  Input and Output Summary (last 24 hours):     No intake or output data in the 24 hours ending 22 1416    Physical Exam:     Physical Exam  General: breathing well on 4 L oxygen via NC, no acute distress  HEENT: NC/AT, PERRL, EOM - normal  Neck: Supple  Pulm/Chest: Normal chest wall expansion, decreased breath sounds on both side, no wheezing/rhonchi or crackles appreciated  CVS: RRR, normal S1&S2, no murmur appreciated, capillary refill <2s  Abd: soft, non tender, non distended, bowel sounds +  MSK: move all 4 extremities spontaneously  Skin: warm  CNS: no acute focal neuro deficit      Additional Data:     Labs:    Results from last 7 days   Lab Units 22  0531 22  0519 22  0417   WBC Thousand/uL 7 74   < > 4 08*   HEMOGLOBIN g/dL 13 2   < > 14 0   HEMATOCRIT % 42 1   < > 43 9   PLATELETS Thousands/uL 273   < > 209   NEUTROS PCT %  --   --  79*   LYMPHS PCT %  --   --  17   MONOS PCT %  --   --  3*   EOS PCT %  --   --  0    < > = values in this interval not displayed  Results from last 7 days   Lab Units 01/24/22  0531   POTASSIUM mmol/L 4 4   CHLORIDE mmol/L 106   CO2 mmol/L 25   BUN mg/dL 21   CREATININE mg/dL 0 62   CALCIUM mg/dL 9 0   ALK PHOS U/L 84   ALT U/L 35   AST U/L 26     Results from last 7 days   Lab Units 01/21/22  1844   INR  1 00       * I Have Reviewed All Lab Data Listed Above  * Additional Pertinent Lab Tests Reviewed: [address] [facility] Admission Reviewed    Imaging:    Imaging Reports Reviewed Today Include:  Echo  Imaging Personally Reviewed by Myself Includes:  None      Recent Cultures (last 7 days):     Results from last 7 days   Lab Units 01/21/22  1844   BLOOD CULTURE  No Growth at 48 hrs  No Growth at 48 hrs  Last 24 Hours Medication List:   Current Facility-Administered Medications   Medication Dose Route Frequency Provider Last Rate    acetaminophen  650 mg Oral Q4H PRN Quiana Ee, PA-C      albuterol  2 puff Inhalation Q6H PRN Quiana Ee, PA-C      Baricitinib  4 mg Oral Q24H Erlin Quintanilla DO      cholecalciferol  1,000 Units Oral Daily Quiana Ee, PA-C      dexamethasone  6 mg Intravenous Q24H Quiana Ee, PA-C      heparin (porcine)  3-30 Units/kg/hr (Order-Specific) Intravenous Titrated Myrl Eagles, PA-C 15 Units/kg/hr (01/24/22 1200)    heparin (porcine)  10,000 Units Intravenous Q1H PRN Myrl Eagles, PA-C      heparin (porcine)  5,000 Units Intravenous Q1H PRN Myrl Eagles, PA-C      [facility]   Oral Daily Quiana Ee, [place]      LORazepam  0 5 mg Oral BID PRN Quiana Ee, PA-C      multivitamin stress formula  1 tablet Oral Daily Quiana Ee, PA-C      ondansetron  4 mg Intravenous Q6H PRN Quiana Ee, PA-C      remdesivir  100 mg Intravenous Q24H Quiana Ee, PA-C          Today, Patient Was Seen By: Shirin Townsend MD    ** Please Note: Dragon 360 Dictation voice to text software may have been used in the creation of this document   **

## 2022-01-24 NOTE — PLAN OF CARE
Problem: INFECTION - ADULT  Goal: Absence of fever/infection during neutropenic period  Description: INTERVENTIONS:  - Monitor WBC    Outcome: Progressing     Problem: SAFETY ADULT  Goal: Maintain or return to baseline ADL function  Description: INTERVENTIONS:  -  Assess patient's ability to carry out ADLs; assess patient's baseline for ADL function and identify physical deficits which impact ability to perform ADLs (bathing, care of mouth/teeth, toileting, grooming, dressing, etc )  - Assess/evaluate cause of self-care deficits   - Assess range of motion  - Assess patient's mobility; develop plan if impaired  - Assess patient's need for assistive devices and provide as appropriate  - Encourage maximum independence but intervene and supervise when necessary  - Involve family in performance of ADLs  - Assess for home care needs following discharge   - Consider OT consult to assist with ADL evaluation and planning for discharge  - Provide patient education as appropriate  Outcome: Progressing  Goal: Maintains/Returns to pre admission functional level  Description: INTERVENTIONS:  - Perform BMAT or MOVE assessment daily    - Set and communicate daily mobility goal to care team and patient/family/caregiver  - Collaborate with rehabilitation services on mobility goals if consulted  - Perform Range of Motion 2 times a day  - Reposition patient every 2 hours    - Dangle patient 2 times a day  - Stand patient 2 times a day  - Ambulate patient 2 times a day  - Out of bed to chair 2 times a day   - Out of bed for meals 2 times a day  - Out of bed for toileting  - Record patient progress and toleration of activity level   Outcome: Progressing     Problem: DISCHARGE PLANNING  Goal: Discharge to home or other facility with appropriate resources  Description: INTERVENTIONS:  - Identify barriers to discharge w/patient and caregiver  - Arrange for needed discharge resources and transportation as appropriate  - Identify discharge learning needs (meds, wound care, etc )  - Arrange for interpretive services to assist at discharge as needed  - Refer to Case Management Department for coordinating discharge planning if the patient needs post-hospital services based on physician/advanced practitioner order or complex needs related to functional status, cognitive ability, or social support system  Outcome: Progressing     Problem: Knowledge Deficit  Goal: Patient/family/caregiver demonstrates understanding of disease process, treatment plan, medications, and discharge instructions  Description: Complete learning assessment and assess knowledge base    Interventions:  - Provide teaching at level of understanding  - Provide teaching via preferred learning methods  Outcome: Progressing     Problem: RESPIRATORY - ADULT  Goal: Achieves optimal ventilation and oxygenation  Description: INTERVENTIONS:  - Assess for changes in respiratory status  - Assess for changes in mentation and behavior  - Position to facilitate oxygenation and minimize respiratory effort  - Oxygen administered by appropriate delivery if ordered  - Initiate smoking cessation education as indicated  - Encourage broncho-pulmonary hygiene including cough, deep breathe, Incentive Spirometry  - Assess the need for suctioning and aspirate as needed  - Assess and instruct to report SOB or any respiratory difficulty  - Respiratory Therapy support as indicated  Outcome: Progressing

## 2022-01-24 NOTE — NURSING NOTE
Patient awake and alert, resting in bed at present  Patient denies pain, offers no complaints of SOB  Patient on 4L, saturations in the 90's  Call bell and belongings within reach

## 2022-01-25 LAB
ANION GAP SERPL CALCULATED.3IONS-SCNC: 9 MMOL/L (ref 4–13)
APTT PPP: 131 SECONDS (ref 23–37)
APTT PPP: 55 SECONDS (ref 23–37)
ATRIAL RATE: 70 BPM
BASOPHILS # BLD MANUAL: 0 THOUSAND/UL (ref 0–0.1)
BASOPHILS NFR MAR MANUAL: 0 % (ref 0–1)
BUN SERPL-MCNC: 20 MG/DL (ref 5–25)
CALCIUM SERPL-MCNC: 9.4 MG/DL (ref 8.4–10.2)
CHLORIDE SERPL-SCNC: 104 MMOL/L (ref 96–108)
CO2 SERPL-SCNC: 25 MMOL/L (ref 21–32)
CREAT SERPL-MCNC: 0.7 MG/DL (ref 0.6–1.3)
EOSINOPHIL # BLD MANUAL: 0 THOUSAND/UL (ref 0–0.4)
EOSINOPHIL NFR BLD MANUAL: 0 % (ref 0–6)
ERYTHROCYTE [DISTWIDTH] IN BLOOD BY AUTOMATED COUNT: 14.6 % (ref 11.6–15.1)
GFR SERPL CREATININE-BSD FRML MDRD: 96 ML/MIN/1.73SQ M
GLUCOSE SERPL-MCNC: 159 MG/DL (ref 65–140)
HCT VFR BLD AUTO: 43.5 % (ref 34.8–46.1)
HGB BLD-MCNC: 13.8 G/DL (ref 11.5–15.4)
LG PLATELETS BLD QL SMEAR: PRESENT
LYMPHOCYTES # BLD AUTO: 0.82 THOUSAND/UL (ref 0.6–4.47)
LYMPHOCYTES # BLD AUTO: 11 % (ref 14–44)
MCH RBC QN AUTO: 28.3 PG (ref 26.8–34.3)
MCHC RBC AUTO-ENTMCNC: 31.7 G/DL (ref 31.4–37.4)
MCV RBC AUTO: 89 FL (ref 82–98)
METAMYELOCYTES NFR BLD MANUAL: 5 % (ref 0–1)
MONOCYTES # BLD AUTO: 0.52 THOUSAND/UL (ref 0–1.22)
MONOCYTES NFR BLD: 7 % (ref 4–12)
MYELOCYTES NFR BLD MANUAL: 4 % (ref 0–1)
NEUTROPHILS # BLD MANUAL: 5.31 THOUSAND/UL (ref 1.85–7.62)
NEUTS BAND NFR BLD MANUAL: 3 % (ref 0–8)
NEUTS SEG NFR BLD AUTO: 68 % (ref 43–75)
P AXIS: 61 DEGREES
PLATELET # BLD AUTO: 263 THOUSANDS/UL (ref 149–390)
PLATELET BLD QL SMEAR: ADEQUATE
PMV BLD AUTO: 11.1 FL (ref 8.9–12.7)
POTASSIUM SERPL-SCNC: 4.9 MMOL/L (ref 3.5–5.3)
PR INTERVAL: 154 MS
QRS AXIS: 78 DEGREES
QRSD INTERVAL: 88 MS
QT INTERVAL: 448 MS
QTC INTERVAL: 483 MS
RBC # BLD AUTO: 4.87 MILLION/UL (ref 3.81–5.12)
RBC MORPH BLD: NORMAL
SODIUM SERPL-SCNC: 138 MMOL/L (ref 135–147)
T WAVE AXIS: 65 DEGREES
VARIANT LYMPHS # BLD AUTO: 2 %
VENTRICULAR RATE: 70 BPM
WBC # BLD AUTO: 7.48 THOUSAND/UL (ref 4.31–10.16)

## 2022-01-25 PROCEDURE — 99232 SBSQ HOSP IP/OBS MODERATE 35: CPT | Performed by: INTERNAL MEDICINE

## 2022-01-25 PROCEDURE — 85007 BL SMEAR W/DIFF WBC COUNT: CPT | Performed by: INTERNAL MEDICINE

## 2022-01-25 PROCEDURE — 93010 ELECTROCARDIOGRAM REPORT: CPT | Performed by: INTERNAL MEDICINE

## 2022-01-25 PROCEDURE — 85730 THROMBOPLASTIN TIME PARTIAL: CPT | Performed by: INTERNAL MEDICINE

## 2022-01-25 PROCEDURE — 80048 BASIC METABOLIC PNL TOTAL CA: CPT | Performed by: INTERNAL MEDICINE

## 2022-01-25 PROCEDURE — 85027 COMPLETE CBC AUTOMATED: CPT | Performed by: INTERNAL MEDICINE

## 2022-01-25 RX ADMIN — BARICITINIB 4 MG: 2 TABLET, FILM COATED ORAL at 14:12

## 2022-01-25 RX ADMIN — Medication 1000 UNITS: at 10:11

## 2022-01-25 RX ADMIN — DEXAMETHASONE SODIUM PHOSPHATE 6 MG: 4 INJECTION, SOLUTION INTRA-ARTICULAR; INTRALESIONAL; INTRAMUSCULAR; INTRAVENOUS; SOFT TISSUE at 21:21

## 2022-01-25 RX ADMIN — HEPARIN SODIUM 15 UNITS/KG/HR: 10000 INJECTION, SOLUTION INTRAVENOUS at 02:49

## 2022-01-25 RX ADMIN — HEPARIN SODIUM 5000 UNITS: 1000 INJECTION, SOLUTION INTRAVENOUS; SUBCUTANEOUS at 10:10

## 2022-01-25 RX ADMIN — B-COMPLEX W/ C & FOLIC ACID TAB 1 TABLET: TAB at 10:11

## 2022-01-25 RX ADMIN — HEPARIN SODIUM 14 UNITS/KG/HR: 10000 INJECTION, SOLUTION INTRAVENOUS; SUBCUTANEOUS at 20:55

## 2022-01-25 RX ADMIN — REMDESIVIR 100 MG: 100 INJECTION, POWDER, LYOPHILIZED, FOR SOLUTION INTRAVENOUS at 21:22

## 2022-01-25 NOTE — NURSING NOTE
Awake and alert and oriented x4  02 is maintained 4 l n/c  Dry cough lungs are clear and decreased  Abdomen soft and non tender  lbm today  Voids freely and yellow /clear  No edema' plus 2 pedal/radial pulses  Call bell near   No complaints

## 2022-01-25 NOTE — ASSESSMENT & PLAN NOTE
· Patient is currently on 4 L oxygen via NC  · Placed on O2 and respiratory protocol with incentive spirometry  · Continue pre-hospital Proventil 90 mcg 2 puffs q 6 hours p r n    · Continue Rocephin 1 g IV daily and Vibramycin 100 mg p o  q 12 hours  · Continue Decadron day 5 of 10  · Continue remdesivir day 5 of 5  · Baricitinib day 3 of 14  · Incentive spirometry  · Continue to encourage self pronating

## 2022-01-25 NOTE — PROGRESS NOTES
Darryl 44  Progress Note - Easton Abraham 1965, 61 y o  female MRN: [id number]  Unit/Bed#: -01 Encounter: [id number]  Primary Care Provider: Nara Jack MD   Date and time admitted to hospital: 1/21/2022  6:09 PM    * Pneumonia due to COVID-19 virus  Assessment & Plan  · Patient is currently on 4 L oxygen via NC  · Placed on O2 and respiratory protocol with incentive spirometry  · Continue pre-hospital Proventil 90 mcg 2 puffs q 6 hours p r n  · Continue Rocephin 1 g IV daily and Vibramycin 100 mg p o  q 12 hours  · Continue Decadron day 5 of 10  · Continue remdesivir day 5 of 5  · Baricitinib day 3 of 14  · Incentive spirometry  · Continue to encourage self pronating    Bilateral pulmonary embolism (HCC)  Assessment & Plan  · Continue heparin drip, plan to switch to Eliquis at the time of discharge  When the patient is ready for discharge, will discuss with  for price check  · Echo showed  Left ventricular cavity size is normal  The left ventricular ejection fraction is 60%  Systolic function is normal  Wall motion is normal  Diastolic function is normal for age  · Right Ventricle: Right ventricular cavity size is mildly dilated   Systolic function is low normal   · Venous duplex showed no evidence of DVT  · Placed on O2 and respiratory protocol    Hypoxia  Assessment & Plan  · Likely multifactorial given her COVID-19 infection as well as bilateral pulmonary embolism  · Being treated as per above    Vitamin D deficiency  Assessment & Plan  · Continue pre-hospital vitamin D3 1000 units p o  daily    Obstructive sleep apnea  Assessment & Plan  · Will place on O2 and respiratory protocol    Obesity due to excess calories  Assessment & Plan  Encourage healthy weight loss and supportive care      VTE Pharmacologic Prophylaxis:   Pharmacologic: Heparin Drip  Mechanical VTE Prophylaxis in Place: Yes    Patient Centered Rounds: I have performed bedside rounds with nursing staff today  Discussions with Specialists or Other Care Team Provider:  Discussed with Case Management, nurse    Education and Discussions with Family / Patient:  Discussed with patient    Time Spent for Care: 45 minutes  More than 50% of total time spent on counseling and coordination of care as described above  Current Length of Stay: 4 day(s)    Current Patient Status: Inpatient   Certification Statement: The patient will continue to require additional inpatient hospital stay due to COVID pneumonia, bilateral PE    Discharge Plan / Estimated Discharge Date:  Pending      Code Status: Level 1 - Full Code      Subjective:   Patient was seen and examined at bedside  The patient is still easily winded  She  denies any bleeding, chest pain, palpitation, shortness of breath, N/V, abd pain  Objective:     Vitals:   Temp (24hrs), Av 9 °F (36 6 °C), Min:97 7 °F (36 5 °C), Max:98 4 °F (36 9 °C)    Temp:  [97 7 °F (36 5 °C)-98 4 °F (36 9 °C)] 98 4 °F (36 9 °C)  HR:  [63-73] 71  Resp:  [18] 18  BP: (102-153)/(58-94) 153/94  SpO2:  [91 %-96 %] 92 %  Body mass index is 45 73 kg/m²  Input and Output Summary (last 24 hours):        Intake/Output Summary (Last 24 hours) at 2022 1408  Last data filed at 2022 0900  Gross per 24 hour   Intake 360 ml   Output --   Net 360 ml       Physical Exam:     Physical Exam  General: breathing well on 4 liters oxygen via NC, no acute distress  HEENT: NC/AT, PERRL, EOM - normal  Neck: Supple  Pulm/Chest: Normal chest wall expansion, decreased breath sounds on both side, no wheezing/rhonchi or crackles appreciated  CVS: RRR, normal S1&S2, no murmur appreciated, capillary refill <2s  Abd: soft, non tender, non distended, bowel sounds +  MSK: move all 4 extremities spontaneously  Skin: warm  CNS: no acute focal neuro deficit      Additional Data:     Labs:    Results from last 7 days   Lab Units 22  0453 22  0519 22  0417   WBC Thousand/uL 7 48   < > 4 08*   HEMOGLOBIN g/dL 13 8   < > 14 0   HEMATOCRIT % 43 5   < > 43 9   PLATELETS Thousands/uL 263   < > 209   NEUTROS PCT %  --   --  79*   LYMPHS PCT %  --   --  17   LYMPHO PCT % 11*  --   --    MONOS PCT %  --   --  3*   MONO PCT % 7  --   --    EOS PCT % 0  --  0    < > = values in this interval not displayed  Results from last 7 days   Lab Units 01/25/22  0453 01/24/22  0531 01/24/22  0531   POTASSIUM mmol/L 4 9   < > 4 4   CHLORIDE mmol/L 104   < > 106   CO2 mmol/L 25   < > 25   BUN mg/dL 20   < > 21   CREATININE mg/dL 0 70   < > 0 62   CALCIUM mg/dL 9 4   < > 9 0   ALK PHOS U/L  --   --  84   ALT U/L  --   --  35   AST U/L  --   --  26    < > = values in this interval not displayed  Results from last 7 days   Lab Units 01/21/22  1844   INR  1 00       * I Have Reviewed All Lab Data Listed Above  * Additional Pertinent Lab Tests Reviewed: [address] 65 Admission Reviewed    Imaging:    Imaging Reports Reviewed Today Include:  CTA chest  Imaging Personally Reviewed by Myself Includes:  None      Recent Cultures (last 7 days):     Results from last 7 days   Lab Units 01/21/22  1844   BLOOD CULTURE  No Growth at 72 hrs  No Growth at 72 hrs         Last 24 Hours Medication List:   Current Facility-Administered Medications   Medication Dose Route Frequency Provider Last Rate    acetaminophen  650 mg Oral Q4H PRN Alphonsa Part, PA-C      albuterol  2 puff Inhalation Q6H PRN Alphonsa Part, PA-C      Baricitinib  4 mg Oral Q24H Radha Shepard, DO      cholecalciferol  1,000 Units Oral Daily Alphonsa Part, PA-C      dexamethasone  6 mg Intravenous Q24H Alphonsa Part, PA-C      heparin (porcine)  3-30 Units/kg/hr (Order-Specific) Intravenous Titrated Jaimee Deutsandreia PA-C 17 Units/kg/hr (01/25/22 1203)    heparin (porcine)  10,000 Units Intravenous Q1H PRN Jaimee Deutsandreia, PA-C      heparin (porcine)  5,000 Units Intravenous Q1H PRN Jaimee Deutscher, PA-C     [employer]   Oral Daily Heidi Ivey PA-C      LORazepam  0 5 mg Oral BID PRN Heidi Ivey PA-C      multivitamin stress formula  1 tablet Oral Daily Heidi Ivey PA-C      ondansetron  4 mg Intravenous Q6H PRN Heidi Ivey PA-C      remdesivir  100 mg Intravenous Q24H Heidi Ivey PA-C          Today, Patient Was Seen By: Chencho Knowles MD    ** Please Note: Dragon 360 Dictation voice to text software may have been used in the creation of this document   **

## 2022-01-25 NOTE — ASSESSMENT & PLAN NOTE
· Continue heparin drip, plan to switch to Eliquis at the time of discharge  When the patient is ready for discharge, will discuss with  for price check  · Echo showed  Left ventricular cavity size is normal  The left ventricular ejection fraction is 60%  Systolic function is normal  Wall motion is normal  Diastolic function is normal for age  · Right Ventricle: Right ventricular cavity size is mildly dilated   Systolic function is low normal   · Venous duplex showed no evidence of DVT  · Placed on O2 and respiratory protocol

## 2022-01-26 VITALS
TEMPERATURE: 97.8 F | HEART RATE: 68 BPM | BODY MASS INDEX: 45.83 KG/M2 | DIASTOLIC BLOOD PRESSURE: 60 MMHG | HEIGHT: 67 IN | RESPIRATION RATE: 18 BRPM | OXYGEN SATURATION: 90 % | SYSTOLIC BLOOD PRESSURE: 112 MMHG | WEIGHT: 292 LBS

## 2022-01-26 LAB
ANION GAP SERPL CALCULATED.3IONS-SCNC: 13 MMOL/L (ref 4–13)
APTT PPP: 67 SECONDS (ref 23–37)
APTT PPP: 71 SECONDS (ref 23–37)
BUN SERPL-MCNC: 26 MG/DL (ref 5–25)
CALCIUM SERPL-MCNC: 9.5 MG/DL (ref 8.4–10.2)
CHLORIDE SERPL-SCNC: 103 MMOL/L (ref 96–108)
CO2 SERPL-SCNC: 22 MMOL/L (ref 21–32)
CREAT SERPL-MCNC: 0.75 MG/DL (ref 0.6–1.3)
ERYTHROCYTE [DISTWIDTH] IN BLOOD BY AUTOMATED COUNT: 14.5 % (ref 11.6–15.1)
GFR SERPL CREATININE-BSD FRML MDRD: 88 ML/MIN/1.73SQ M
GLUCOSE SERPL-MCNC: 164 MG/DL (ref 65–140)
HCT VFR BLD AUTO: 44.7 % (ref 34.8–46.1)
HGB BLD-MCNC: 14.2 G/DL (ref 11.5–15.4)
MCH RBC QN AUTO: 28.9 PG (ref 26.8–34.3)
MCHC RBC AUTO-ENTMCNC: 31.8 G/DL (ref 31.4–37.4)
MCV RBC AUTO: 91 FL (ref 82–98)
PLATELET # BLD AUTO: 275 THOUSANDS/UL (ref 149–390)
PMV BLD AUTO: 11.5 FL (ref 8.9–12.7)
POTASSIUM SERPL-SCNC: 4.4 MMOL/L (ref 3.5–5.3)
RBC # BLD AUTO: 4.91 MILLION/UL (ref 3.81–5.12)
SODIUM SERPL-SCNC: 138 MMOL/L (ref 135–147)
WBC # BLD AUTO: 8.42 THOUSAND/UL (ref 4.31–10.16)

## 2022-01-26 PROCEDURE — 80048 BASIC METABOLIC PNL TOTAL CA: CPT | Performed by: INTERNAL MEDICINE

## 2022-01-26 PROCEDURE — 94761 N-INVAS EAR/PLS OXIMETRY MLT: CPT

## 2022-01-26 PROCEDURE — 85730 THROMBOPLASTIN TIME PARTIAL: CPT | Performed by: INTERNAL MEDICINE

## 2022-01-26 PROCEDURE — 99239 HOSP IP/OBS DSCHRG MGMT >30: CPT | Performed by: INTERNAL MEDICINE

## 2022-01-26 PROCEDURE — 85027 COMPLETE CBC AUTOMATED: CPT | Performed by: INTERNAL MEDICINE

## 2022-01-26 RX ADMIN — BARICITINIB 4 MG: 2 TABLET, FILM COATED ORAL at 12:22

## 2022-01-26 RX ADMIN — Medication 1000 UNITS: at 08:18

## 2022-01-26 RX ADMIN — APIXABAN 10 MG: 5 TABLET, FILM COATED ORAL at 17:04

## 2022-01-26 RX ADMIN — B-COMPLEX W/ C & FOLIC ACID TAB 1 TABLET: TAB at 08:19

## 2022-01-26 NOTE — CASE MANAGEMENT
Case Management Discharge Planning Note    Patient name Mack Rogers  Location /-33 MRN [id number]  : 1965 Date 2022       Current Admission Date: 2022  Current Admission Diagnosis:Pneumonia due to COVID-19 virus   Patient Active Problem List    Diagnosis Date Noted    Pneumonia due to COVID-19 virus 2022    Bilateral pulmonary embolism ([facility] [address] ) 2022    Hypoxia 2022    Mixed hyperlipidemia 2021    Vitamin D deficiency 2021    S/P total abdominal hysterectomy and bilateral salpingo-oophorectomy 2021    Rotator cuff arthropathy of right shoulder 2021    Rotator cuff tear arthropathy of right shoulder 2021    Left ankle pain 10/29/2020    Anxiety 10/29/2020    Grieving 10/29/2020    Osteoarthritis of multiple joints 10/29/2020    Traumatic complete tear of left rotator cuff 2020    Chronic left shoulder pain 2020    History of endometrial cancer 2019    Chronic midline low back pain without sciatica 2018    Nasal congestion 2016    Degenerative arthritis of cervical spine 2016    Lung nodule 2016    Obesity due to excess calories 2016    Pulmonary nodule 2016    Hyperesthesia 2015    Migraine headache 2015    Obstructive sleep apnea 2015    Frequent headaches 10/28/2014    Hypothyroidism 10/28/2014    Paresthesias 10/28/2014    Paroxysmal atrial fibrillation ([facility] [address] ) 10/28/2014      LOS (days): 5  Geometric Mean LOS (GMLOS) (days): 5 40  Days to GMLOS:0 6     OBJECTIVE:  Risk of Unplanned Readmission Score: 8         Current admission status: Inpatient   Preferred Pharmacy:   [id number], [address] [place] [address] [address]  [address]   [place] [id number]  Phone: [phone] Fax: [phone]    Primary Care Provider: Juan Birmingham MD    Primary Insurance: [employer] HOLT  Secondary Insurance: DISCHARGE DETAILS:    Discharge planning discussed with[de-identified] patient and  was called at 15:36 pm  Freedom of Choice: Yes  Comments - Freedom of Choice: pt had a desat study and does not qualify for home oxygen, pt denies any d/c needs, pt is aware that eliquis copay is $25 00 the  pharmacy is able to fill part of her rx , she is aware she will need to go back after more has been ordered, pt was given a free 30 day coupon  CM contacted family/caregiver?: Yes  Were Treatment Team discharge recommendations reviewed with patient/caregiver?: Yes  Did patient/caregiver verbalize understanding of patient care needs?: Yes  Were patient/caregiver advised of the risks associated with not following Treatment Team discharge recommendations?: Yes    Contacts  Patient Contacts: Swathi Justiceedilberto  Relationship to Patient[de-identified] Family ()  Contact Method: Phone  Phone Number: [phone]  Reason/Outcome: Discharge [address]         Is the patient interested in [address] at discharge?: No    DME Referral Provided  Referral made for DME?: No (desat study completed no home oxygen needed)              Treatment Team Recommendation: Home (home)  Discharge Destination Plan[de-identified] Home (home with spouse and outpt follow up)                                      Family notified[de-identified]  was called

## 2022-01-26 NOTE — DISCHARGE INSTRUCTIONS
Discharge instructions from hospitalist  1  Follow-up with your primary care physician in 1 week in regards to recent hospitalization  2  Take medications regularly  Changes in medications    Take Eliquis 10 mg twice a day for 7 days then followed by Eliquis 5 mg twice a day  3  Come back to the ER if symptoms recur or worsen  4  Activity as tolerated  5  Diet :  Heart healthy diet; information packet has more detailed information          COVID-19 (Coronavirus Disease 2019)   WHAT YOU NEED TO KNOW:   Coronavirus disease 2019 (COVID-19) is the disease caused by a coronavirus first discovered in December 2019  Coronaviruses generally cause upper respiratory (nose, throat, and lung) infections, such as a cold  The new virus spreads quickly and easily  The virus can be spread starting 2 days before symptoms even begin  The virus has also changed into several new forms (called variants) since it was discovered  The variants may be more contagious (easily spread) than the original form  Some may also cause more severe illness than others  It is important to follow local, national, and worldwide measures to protect yourself and others from infection  DISCHARGE INSTRUCTIONS:   If you think you or someone you know may be infected:  Do the following to protect others:  · If emergency care is needed,  tell the  about the possible infection, or call ahead and tell the emergency department  · Call a healthcare provider  for instructions if symptoms are mild  Anyone who may be infected should not  arrive without calling first  The provider will need to protect staff members and other patients  · The person who may be infected needs to wear a face covering  while getting medical care  This will help lower the risk of infecting others  Coverings are not used for anyone who is younger than 2 years, has breathing problems, or cannot remove it   The provider can give you instructions for anyone who cannot wear a covering  Call your local emergency number (911 in the [address]) or an emergency department if:   · You have trouble breathing or shortness of breath at rest     · You have chest pain or pressure that lasts longer than 5 minutes  · You become confused or hard to wake  · Your lips or face are blue  · You have a fever of 104°F (40°C) or higher  Call your doctor if:   · You do not  have symptoms of COVID-19 but had close physical contact within 14 days with someone who tested positive  · You have questions or concerns about your condition or care  Medicines: You may need any of the following:  · Decongestants  help reduce nasal congestion and help you breathe more easily  If you take decongestant pills, they may make you feel restless or cause problems with your sleep  Do not use decongestant sprays for more than a few days  · Cough suppressants  help reduce coughing  Ask your healthcare provider which type of cough medicine is best for you  · Acetaminophen  decreases pain and fever  It is available without a doctor's order  Ask how much to take and how often to take it  Follow directions  Read the labels of all other medicines you are using to see if they also contain acetaminophen, or ask your doctor or pharmacist  Acetaminophen can cause liver damage if not taken correctly  Do not use more than 4 grams (4,000 milligrams) total of acetaminophen in one day  · NSAIDs , such as ibuprofen, help decrease swelling, pain, and fever  NSAIDs can cause stomach bleeding or kidney problems in certain people  If you take blood thinner medicine, always ask your healthcare provider if NSAIDs are safe for you  Always read the medicine label and follow directions  · Take your medicine as directed  Contact your healthcare provider if you think your medicine is not helping or if you have side effects  Tell him or her if you are allergic to any medicine   Keep a list of the medicines, vitamins, and herbs you take  Include the amounts, and when and why you take them  Bring the list or the pill bottles to follow-up visits  Carry your medicine list with you in case of an emergency  What you need to know about COVID-19 vaccines:  Get a vaccine even if you already had COVID-19  · COVID-19 vaccines are given as a shot in 1 or 2 doses  Some vaccines have emergency use authorization (EUA)  An EUA means the vaccine is not approved but is given because the benefits outweigh the risks  A 2-dose vaccine is fully approved for use in those 16 years or older  This vaccine also has an EUA for adolescents 12 to 15 years  Your healthcare provider can help you understand the benefits and risks  · A third dose is recommended for adults with a weakened immune system who get a 2-dose vaccine  The third dose is given at least 28 days after the second  · Even after you get the vaccine, continue social distancing and other measures  Experts are still learning how well the vaccines work to prevent infection, transmission, and severe illness  Although rare, you can become infected after you get the vaccine  You may also be able to pass the virus to others without knowing you are infected  · After you get the vaccine, check local, national, and international travel rules  Check to see if you need to be tested before you travel  You may also need to quarantine after you return  Some countries require proof of a negative test before you leave  You should also be tested 3 to 5 days after you return from another country  How the 2019 coronavirus spreads: The following are ways the virus is thought to spread, but more information may be coming:  · Droplets are the main way all coronaviruses spread  The virus travels in droplets that form when a person talks, coughs, or sneezes  The droplets can also float in the air for minutes or hours  Infection happens when you breathe in the droplets or get them in your eyes or nose   Close personal contact with an infected person increases your risk for infection  This means being within 6 feet (2 meters) of the person for at least 15 minutes over 24 hours  · Person-to-person contact can spread the virus  For example, a person with the virus on his or her hands can spread it by shaking hands with someone  · The virus can stay on objects and surfaces for a short time  You may become infected by touching the object or surface and then touching your eyes or mouth  · An infected animal may be able to infect a person who touches it  This may happen at live markets or on a farm  Help lower the risk for COVID-19:  The best way to prevent infection is to avoid anyone who is infected, but this can be hard to do  An infected person can spread the virus before signs or symptoms begin, or even if signs or symptoms never develop  The following can help lower the risk for infection:      · Wash your hands often throughout the day  Use soap and water  Rub your soapy hands together, lacing your fingers, for at least 20 seconds  Rinse with warm, running water  Dry your hands with a clean towel or paper towel  Use hand  that contains alcohol if soap and water are not available  Teach children how to wash their hands and use hand   · Cover sneezes and coughs  Turn your face away and cover your mouth and nose with a tissue  Throw the tissue away  Use the bend of your arm if a tissue is not available  Then wash your hands well with soap and water or use hand   Teach children how to cover a cough or sneeze  · Wear a face covering (mask) around anyone who does not live in your home  Use a cloth covering with at least 2 layers  You can also create layers by putting a cloth covering over a disposable non-medical mask  Cover your mouth and your nose  The covering should fit snugly against the bridge of your nose   Securely fasten it under your chin and on the sides of your face  Do not  wear a plastic face shield instead of a covering  Continue social distancing and washing your hands often  A face covering is not a substitute for social distancing safety measures  · Follow worldwide, national, and local social distancing guidelines  Keep at least 6 feet (2 meters) between you and others  Also keep this distance from anyone who comes to your home, such as someone making a delivery  Wear a face covering while you are around others  You will need to wear a covering in restaurants, stores, and other public buildings  You will also need a covering on mass transit, such as a bus, subway, or airplane  Remember to use a covering made from thick material or wear 2 coverings together  · Make a habit of not touching your face  If you get the virus on your hands, you can transfer it to your eyes, nose, or mouth and become infected  You can also transfer it to objects, surfaces, or people  Do not touch your eyes, nose, or mouth without washing your hands first     · Clean and disinfect high-touch surfaces and objects often  Use disinfecting wipes, or make a solution of 4 teaspoons of bleach in 1 quart (4 cups) of water  Clean and disinfect even if you think no one living in or coming to your home is infected with the virus  · Ask about other vaccines you may need  Get the influenza (flu) vaccine as soon as recommended each year, usually starting in September or October  Get the pneumonia vaccine if recommended  Your healthcare provider can tell you if you should also get other vaccines, and when to get them  Follow social distancing guidelines:  National and local social distancing rules vary  Rules may change over time as restrictions are lifted  Restrictions may return if an outbreak happens where you live  It is important to know and follow all current social distancing rules in your area   The following are general guidelines:  · Stay home if you are sick or think you may have COVID-19  It is important to stay home if you are waiting for a testing appointment or for test results  Even if you do not have symptoms, you can pass the virus to others  · Limit trips out of your home  Have food, medicines, and other supplies delivered and left at your door or other area, if possible  Plan trips out of your home so you make the fewest stops possible to limit close personal contact  Keep track of places you go  This will help contact tracers notify others if you become infected  · Avoid close physical contact with anyone who does not live in your home  Do not shake hands with, hug, or kiss a person as a greeting  If you must use public transportation (such as a bus or subway), try to sit or stand away from others  Only allow necessary people into your home  Wear your face covering, and remind others to wear a face covering  Remind them to wash their hands when they arrive and before they leave  Do not  let someone into your home or go to someone's home just to visit  Even if you both do not feel sick, the virus can pass from one of you to the other  · Avoid in-person gatherings and crowds  Gatherings or crowds of 10 or more individuals can cause the virus to spread  Avoid places such as [place], beaches, sporting events, and tourist attractions  For events such as parties, holiday meals, Rastafari services, and conferences, attend virtually (on a computer), if possible  · Ask your healthcare provider for other ways to have appointments  Some providers offer phone, video, or other types of appointments  You may also be able to get prescriptions for a few months of your medicines at a time  · Stay safe if you must go out to work  Keep physical distance between you and other workers as much as possible  Follow your employer's rules so everyone stays safe  If you have COVID-19 and are recovering at home,  healthcare providers will give you specific instructions to follow  The following are general guidelines to remind you how to keep others safe until you are well:  · Wash your hands often  Use soap and water as much as possible  Use hand  that contains alcohol if soap and water are not available  Dry your hands with a clean towel or paper towel  Do not share towels with anyone  If you use paper towels, throw them away in a lined trash can kept in your room or area  Use a covered trash can, if possible  · Do not go out of your home unless it is necessary  Ask someone who is not infected to go out for groceries or supplies, or have them delivered  Do not go to your healthcare provider's office without an appointment  · Only have close physical contact with a person giving direct care, or a baby or child you must care for  Family members and friends should not visit you  If possible, stay in a separate area or room of your home if you live with others  No one should go into the area or room except to give you care  You can visit with others by phone, video chat, e-mail, or similar systems  · Wear a face covering while others are near you  This can help prevent droplets from spreading the virus when you talk, sneeze, or cough  Put the covering on before anyone comes into your room or area  Remind the person to cover his or her nose and mouth before coming in to provide care for you  · Do not share items  Do not share dishes, towels, or other items with anyone  Items need to be washed after you use them  · Protect your baby  Some newborns have tested positive for the virus  It is not known if they became infected before or after birth  The highest risk is when a  has close contact with an infected person  If you are pregnant or breastfeeding, talk to your healthcare provider or obstetrician about any concerns you have  He or she will tell you when to bring your baby in for check-ups and vaccines   He or she will also tell you what to do if you think your baby was infected with the coronavirus  Wash your hands and put on a clean face covering before you breastfeed or care for your baby  · Do not handle live animals unless it is necessary  Some animals, including pets, have been infected with the new coronavirus  Ask someone who is not infected to take care of your pet until you are well  If you must care for a pet, wear a face covering  Wash your hands before and after you give care  Talk to your healthcare provider about how to keep a service animal safe, if needed  · Follow directions from your healthcare provider for being around others after you recover  It is not known if or for how long a recovered person can pass the virus to others  Your provider may give you instructions, such as continuing social distancing and wearing a face covering  He or she will tell you when it is okay to be around others again  This may be 10 to 20 days after symptoms started or you had a positive test  Most symptoms will also need to be gone  Your provider will give you more information  Follow up with your doctor as directed:  Write down your questions so you remember to ask them during your visits  For more information:   · Centers for Disease Control and Prevention  [address] , [address]  Phone: 7- 956 - [phone]  Web Address: [employer]    © [address] [place] [address] 2021 Information is for End User's use only and may not be sold, redistributed or otherwise used for commercial purposes  All illustrations and images included in CareNotes® are the copyrighted property of A D A M , Inc  or [address] [place] [address]  The above information is an  only  It is not intended as medical advice for individual conditions or treatments  Talk to your doctor, nurse or pharmacist before following any medical regimen to see if it is safe and effective for you      Pulmonary Embolism   WHAT YOU NEED TO KNOW:   A pulmonary embolism (PE) is the sudden blockage of a blood vessel in the lungs by an embolus  A PE can become life-threatening  Go to follow-up appointments and take blood thinners as directed  These are especially important if you were discharged home from the emergency department  DISCHARGE INSTRUCTIONS:   Call your local emergency number (911 in the [address]) if:   · You feel lightheaded, short of breath, and have chest pain  · You cough up blood  Call your doctor if:   · Your arm or leg feels warm, tender, and painful  It may look swollen and red  · You have questions or concerns about your condition or care  Medicines:   · Blood thinners  help prevent blood clots  Clots can cause strokes, heart attacks, and death  The following are general safety guidelines to follow while you are taking a blood thinner:    ? Watch for bleeding and bruising while you take blood thinners  Watch for bleeding from your gums or nose  Watch for blood in your urine and bowel movements  Use a soft washcloth on your skin, and a soft toothbrush to brush your teeth  This can keep your skin and gums from bleeding  If you shave, use an electric shaver  Do not play contact sports  ? Tell your dentist and other healthcare providers that you take a blood thinner  Wear a bracelet or necklace that says you take this medicine  ? Do not start or stop any other medicines unless your healthcare provider tells you to  Many medicines cannot be used with blood thinners  ? Take your blood thinner exactly as prescribed by your healthcare provider  Do not skip does or take less than prescribed  Tell your provider right away if you forget to take your blood thinner, or if you take too much  ? Warfarin  is a blood thinner that you may need to take  The following are things you should be aware of if you take warfarin:     § Foods and medicines can affect the amount of warfarin in your blood  Do not make major changes to your diet while you take warfarin   Warfarin works best when you eat about the same amount of vitamin K every day  Vitamin K is found in green leafy vegetables and certain other foods  Ask for more information about what to eat when you are taking warfarin  § You will need to see your healthcare provider for follow-up visits when you are on warfarin  You will need regular blood tests  These tests are used to decide how much medicine you need  · Take your medicine as directed  Contact your healthcare provider if you think your medicine is not helping or if you have side effects  Tell him or her if you are allergic to any medicine  Keep a list of the medicines, vitamins, and herbs you take  Include the amounts, and when and why you take them  Bring the list or the pill bottles to follow-up visits  Carry your medicine list with you in case of an emergency  Prevent another PE:   · Change your body position or move around often  Move and stretch in your seat several times each hour if you travel by car or work at a desk  In an airplane, get up and walk every hour  · Exercise regularly to help increase your blood flow  Walking is a good low-impact exercise  Talk to your healthcare provider about the best exercise plan for you  · Maintain a healthy weight  Ask your healthcare provider how much you should weigh  Ask him or her to help you create a weight loss plan if you are overweight  · Do not smoke  Nicotine and other chemicals in cigarettes and cigars can damage blood vessels and increase your risk for another PE  Ask your healthcare provider for information if you currently smoke and need help to quit  E-cigarettes or smokeless tobacco still contain nicotine  Talk to your healthcare provider before you use these products  · Ask about birth control if you are a woman who takes the pill  A birth control pill increases the risk for blood clots in certain women   The risk is higher if you are also older than 35, smoke cigarettes, or have a blood clotting disorder  Talk to your healthcare provider about other ways to prevent pregnancy, such as a cervical cap or intrauterine device (IUD)  Follow up with your doctor or specialist as directed: You may need to come in regularly for scans to check for blood clots  Your blood may checked to see how long it takes to clot  Your doctor or specialist will tell you if you need to have this test and how often to have it  Write down your questions so you remember to ask them during your visits  © Copyright [employer] 2021 Information is for End User's use only and may not be sold, redistributed or otherwise used for commercial purposes  All illustrations and images included in CareNotes® are the copyrighted property of A [employer] M [employer] Inc  or [facility] Kavin Morales   The above information is an  only  It is not intended as medical advice for individual conditions or treatments  Talk to your doctor, nurse or pharmacist before following any medical regimen to see if it is safe and effective for you

## 2022-01-26 NOTE — RESPIRATORY THERAPY NOTE
Home Oxygen Qualifying Test       Patient name: Sofya Garcia        : 1965   Date of Test:  2022  Diagnosis: COVID     Home Oxygen Test:    **Medicare Guidelines require item(s) 1-5 on all ambulatory patients or 1 and 2 on non-ambulatory patients  1   Baseline SPO2 on Room Air at rest 93 %  2   SPO2 during exercise on Room Air 90 %  During exercise monitor SpO2  If SPO2 increases >=89% with ambulation do not add supplemental             oxygen  If <= 88% on room air add O2 via NC and titrate patient  Patient must be ambulated with O2 and titrated to > 88% with exertion  3   SPO2 on Oxygen at rest NA % NA lpm     4   SPO2 during exercise on Oxygen  NA % a liter flow of NA lpm     5   Exercise performed:          walking, distance 75 (feet)          []  Supplemental Home Oxygen is indicated  [x]  Client does not qualify for home oxygen  Respiratory Additional Notes- Patient walked in isolation room      Kizzy Holland RRT

## 2022-01-26 NOTE — ASSESSMENT & PLAN NOTE
· Patient was on  4 L oxygen via NC, slowly titrated down and this afternoon, patient is on room air  · Home oxygen protocol via respiratory therapist   Patient does not qualify for home oxygen as she is doing well on room air  · Continue pre-hospital Proventil 90 mcg 2 puffs q 6 hours p r n    · As patient is clinically improved, discontinue barticinib  · Patient does not require steroid at the time of discharge  · Incentive spirometry  · Continue to encourage self pronating

## 2022-01-26 NOTE — DISCHARGE SUMMARY
[address]  Discharge- Marta Safer 1965, 61 y o  female MRN: [id number]  Unit/Bed#: -01 Encounter: [id number]  Primary Care Provider: Becki Contreras MD   Date and time admitted to hospital: 1/21/2022  6:09 PM    * Pneumonia due to COVID-19 virus  Assessment & Plan  · Patient was on  4 L oxygen via NC, slowly titrated down and this afternoon, patient is on room air  · Home oxygen protocol via respiratory therapist   Patient does not qualify for home oxygen as she is doing well on room air  · Continue pre-hospital Proventil 90 mcg 2 puffs q 6 hours p r n  · As patient is clinically improved, discontinue barticinib  · Patient does not require steroid at the time of discharge  · Incentive spirometry  · Continue to encourage self pronating    Bilateral pulmonary embolism ([address] )  Assessment & Plan  · Switched to Eliquis at the time of discharge   did the price check  · Echo showed  Left ventricular cavity size is normal  The left ventricular ejection fraction is 60%  Systolic function is normal  Wall motion is normal  Diastolic function is normal for age  · Right Ventricle: Right ventricular cavity size is mildly dilated   Systolic function is low normal   · Venous duplex showed no evidence of DVT  · Patient is stable to be discharged today      Hypoxia  Assessment & Plan  · Likely multifactorial given her COVID-19 infection as well as bilateral pulmonary embolism  · Being treated as per above    Vitamin D deficiency  Assessment & Plan  · Continue pre-hospital vitamin D3 1000 units p o  daily    Obstructive sleep apnea  Assessment & Plan  · Patient is breathing on room air with SpO2 above 90%    Morbid obesity (HCC)  Assessment & Plan  · Morbid (severe) obesiity  (POA) in the setting of COVID 19 pneumonia and PE as evidenced by documetnation with BMI of 45 73    · Encourage healthy weight loss and supportive care      Discharging Physician / Practitioner: Augustine Brooks, MD  PCP: Josy Jolly MD  Admission Date:   Admission Orders (From admission, onward)     Ordered        01/21/22 2054  Inpatient Admission  Once            01/21/22 2053  Inpatient Admission  Once                      Discharge Date: 01/26/22    Medical Problems             Resolved Problems  Date Reviewed: 1/26/2022    None                Consultations During Hospital Stay:  · None    Procedures Performed:   · None    Significant Findings / Test Results:     CTA - CHEST WITH IV CONTRAST - PULMONARY ANGIOGRAM showed    Acute bilateral pulmonary emboli  Covid pneumonia  No right heart strain identified  Presumed mild mediastinal and hilar area reactive adenopathy  Cholecystectomy     Chest x-ray showed Extensive bilateral groundglass infiltrates compatible with Covid 19 pneumonia  Incidental Findings:   · See above     Test Results Pending at Discharge (will require follow up):   · No     Outpatient Tests Requested:  · No    Complications:  No    Reason for Admission:  Shortness of breath and chest pressure for 4 days    Hospital Course:     Sangita Conroy is a 61 y o  female patient who originally presented to the hospital on 1/21/2022 due to shortness of breath and chest pressure 4 days  Patient had dyspnea even with the minimal exertion  She also described the midsternal chest pressure and found to have SpO2 of 77-78% at home  Patient was found to be COVID positive and admitted under COVID pathway  She was started on remdesivir, antibiotic and Decadron  She was also found to have bilateral PE you therefore she was started on heparin drip  Today, patient is clinically improved  She is breathing on room air  She patient does not need oxygen for home oxygen evaluation  Anticoagulation was switched to Eliquis 10 mg b i d  For 7 days followed by 5 mg b i d   Patient echocardiogram which showed EF 83%, normal systolic function and diastolic function  RV size is mildly dilated    Systolic function is low-normal   No evidence of DVT on venous duplex  Patient is agreeable to go home today  Patient is recommended to follow up with PCP in 1 week  Please see above list of diagnoses and related plan for additional information  Condition at Discharge: stable     Discharge Day Visit / Exam:     Subjective:  Patient was seen and examined at bedside  The patient stated she feels better  She denies chest pain, palpitation, shortness of breath, N/V, abd pain  Vitals: Blood Pressure: 112/60 (01/26/22 0720)  Pulse: 68 (01/26/22 0720)  Temperature: 97 8 °F (36 6 °C) (01/26/22 0720)  Temp Source: Oral (01/25/22 2234)  Respirations: 18 (01/26/22 0720)  Height: 5' 7" (170 2 cm) (01/24/22 9798)  Weight - Scale: 132 kg (292 lb) (01/24/22 0918)  SpO2: 90 % (01/26/22 1034)  Exam:   Physical Exam  General: breathing well on room air, no acute distress  HEENT: NC/AT, PERRL, EOM - normal  Neck: Supple  Pulm/Chest: Normal chest wall expansion, clear breath sounds on both side, no wheezing/rhonchi or crackles appreciated  CVS: RRR, normal S1&S2, no murmur appreciated, capillary refill <2s  Abd: soft, non tender, non distended, bowel sounds +  MSK: move all 4 extremities spontaneously  Skin: warm  CNS: no acute focal neuro deficit    Discussion with Family:  Discussed with     Discharge instructions/Information to patient and family:   See after visit summary for information provided to patient and family  Provisions for Follow-Up Care:  See after visit summary for information related to follow-up care and any pertinent home health orders  Disposition:     Home    For Discharges to [facility]F:   · Not Applicable to this Patient - Not Applicable to this Patient    Planned Readmission: No     Discharge Statement:  I spent 45 minutes discharging the patient  This time was spent on the day of discharge  I had direct contact with the patient on the day of discharge   Greater than 50% of the total time was spent examining patient, answering all patient questions, arranging and discussing plan of care with patient as well as directly providing post-discharge instructions  Additional time then spent on discharge activities  Discharge Medications:  See after visit summary for reconciled discharge medications provided to patient and family        ** Please Note: This note has been constructed using a voice recognition system **

## 2022-01-26 NOTE — ASSESSMENT & PLAN NOTE
· Morbid (severe) obesiity  (POA) in the setting of COVID 19 pneumonia and PE as evidenced by documetnation with BMI of 45 73    · Encourage healthy weight loss and supportive care

## 2022-01-26 NOTE — NURSING NOTE
Awake and pleasant  02 sat 94% 4 liters n/c  Heart is regular  Lungs are clear and decreased  occ dry cough  lbm was today  Abdomen is soft/obese and non tender  Plus 2 pedal/radial pulses  Iv heprin was resumed at 14 units /kg/hr  Iv site adeq x2  Denies pain and no complaints  Call bell is near

## 2022-01-27 LAB
BACTERIA BLD CULT: NORMAL
BACTERIA BLD CULT: NORMAL

## 2022-01-27 NOTE — UTILIZATION REVIEW
Notification of Discharge   This is a Notification of Discharge from our facility [address]  Please be advised that this patient has been discharge from our facility  Below you will find the admission and discharge date and time including the patients disposition  UTILIZATION REVIEW CONTACT:  Sonia Waters  Utilization   Network Utilization Review Department  Phone: [phone] x carefully listen to the prompts  All voicemails are confidential   Email: [email or web address]     PHYSICIAN ADVISORY SERVICES:  FOR IWOP-LF-VYTJ REVIEW - MEDICAL NECESSITY DENIAL  Phone: [phone]  Fax: [phone]  Email: [email or web address]     PRESENTATION DATE: 1/21/2022  6:09 PM  OBERVATION ADMISSION DATE:   INPATIENT ADMISSION DATE: 1/21/22  8:53 PM   DISCHARGE DATE: 1/26/2022  5:05 PM  DISPOSITION: Home/Self Care Home/Self Care      IMPORTANT INFORMATION:  Send all requests for admission clinical reviews, approved or denied determinations and any other requests to dedicated fax number below belonging to the campus where the patient is receiving treatment   List of dedicated fax numbers:  [address] DENIALS (Administrative/Medical Necessity) [phone]   [address] (Maternity/NICU/Pediatrics) [phone]   Sebastien Yoon [phone]   130 [facility] [phone]   [address] [place] [address] [phone]   2000 [facility] [address] [place] [address],[address] [phone]   [facility]  [phone]   [address] [place] [address], [facility]  2401 [facility] 1000 [address] [facility] [phone]

## 2022-01-31 ENCOUNTER — TRANSITIONAL CARE MANAGEMENT (OUTPATIENT)
Dept: INTERNAL MEDICINE CLINIC | Facility: CLINIC | Age: 57
End: 2022-01-31

## 2022-02-03 ENCOUNTER — TELEMEDICINE (OUTPATIENT)
Dept: INTERNAL MEDICINE CLINIC | Facility: CLINIC | Age: 57
End: 2022-02-03
Payer: COMMERCIAL

## 2022-02-03 VITALS — OXYGEN SATURATION: 94 % | HEART RATE: 99 BPM

## 2022-02-03 DIAGNOSIS — I26.99 BILATERAL PULMONARY EMBOLISM (HCC): ICD-10-CM

## 2022-02-03 DIAGNOSIS — J12.82 PNEUMONIA DUE TO COVID-19 VIRUS: Primary | ICD-10-CM

## 2022-02-03 DIAGNOSIS — U07.1 PNEUMONIA DUE TO COVID-19 VIRUS: Primary | ICD-10-CM

## 2022-02-03 PROCEDURE — 1111F DSCHRG MED/CURRENT MED MERGE: CPT | Performed by: FAMILY MEDICINE

## 2022-02-03 PROCEDURE — 99495 TRANSJ CARE MGMT MOD F2F 14D: CPT | Performed by: FAMILY MEDICINE

## 2022-02-03 NOTE — PROGRESS NOTES
Virtual TCM Visit:    Verification of patient location:    Patient is located in the following state in which I hold an active license [place]        Assessment/Plan:        Problem List Items Addressed This Visit        Respiratory    Pneumonia due to COVID-19 virus - Primary       Cardiovascular and Mediastinum    Bilateral pulmonary embolism ([address] )          Reviewed recent hospitalization records with her  Review the CT scan showing the coronavirus pneumonia as well as bilateral pulmonary emboli  Reviewed how severe her illness was and discussed her concerns regarding the immunization  Discussed with her deep breathing exercises  Continue with the incentive spirometer  Continue with prone positioning  Gradually increase activity level  Continue follow-up pulse ox at home  Stay adequately hydrated  Continue the Eliquis until directed otherwise  Discussed use of the [employer] which she does have at home for the cough  Warning signs and symptoms of worsening discussed  Her concerns regarding work discussed  Will re-evaluate her next week to see if she has improved adequately enough to return to work  She still has another 14 work days of paid leave  Discussed FMLA if needed thereafter  Continue vitamin-D supplementation  We will follow-up with her next week or sooner if needed  Reason for visit is follow-up after recent hospitalization for coronavirus pneumonia and bilateral pulmonary emboli  Encounter provider Carolyne Ordaz MD       Provider located at [address] [place] [address]  [address] [facility]  [id number]      Recent Visits  No visits were found meeting these conditions    Showing recent visits within past 7 days and meeting all other requirements  Today's Visits  Date Type Provider Dept   02/03/22 Guilherme Durbin MD  [facility] [place]   Showing today's visits and meeting all other requirements  Future Appointments  No visits were found meeting these conditions  Showing future appointments within next 150 days and meeting all other requirements       After connecting through [employer], the patient was identified by name and date of birth  Renetta Trujillo was informed that this is a telemedicine visit and that the visit is being conducted through [employer] and patient was informed that this is not a secure, HIPAA-compliant platform  She agrees to proceed     My office door was closed  No one else was in the room  She acknowledged consent and understanding of privacy and security of the video platform  The patient has agreed to participate and understands they can discontinue the visit at any time  Patient is aware this is a billable service  Subjective:     Patient ID: Renetta Trujillo is a 61 y o  female  She presents via virtual visit for follow-up after recent hospitalization for coronavirus pneumonia and bilateral pulmonary emboli  She had not been feeling well for approximately 4-5 days at the time of admission  She had initially presented to the urgent care and was found to have pulse ox around 85%  She was checking her pulse ox at home and it was in high 70s  Was evaluated through the emergency room and found to have coronavirus pneumonia as well as bilateral pulmonary emboli  She responded to 5-6 L of oxygen initially and this was slowly weaned  Was treated with the standard coronavirus protocol and antibiotics  Decadron was used  She slowly improved and was weaned off of oxygen  She did not qualify for home oxygen  Since returning home she continues to feel very fatigued and short of breath  Her pulse ox at home will occasionally drop into the high 80s with exertion but is typically in the low 90s  Is able to do small activities like vacuuming and cooking dinner but fatigues very easily  Continues to do the incentive spirometer  Continues to prone position    Has been taking the Eliquis without difficulty  Has not been taking anything regularly for cough but does have Tessalon Perles at home  She is very concerned about return to work  She is very concerned about being able to care for her family especially since she has 26-year-old son and an 6year-old grandson that they are raising  Review of Systems   Constitutional: Positive for activity change and fatigue  Negative for appetite change, chills and fever  HENT: Negative for congestion and rhinorrhea  Eyes: Negative for visual disturbance  Respiratory: Positive for cough, chest tightness and shortness of breath  Cardiovascular: Negative for chest pain, palpitations and leg swelling  Gastrointestinal: Negative for abdominal pain, diarrhea, nausea and vomiting  Endocrine: Negative for polydipsia, polyphagia and polyuria  Genitourinary: Negative for frequency and urgency  Musculoskeletal: Positive for arthralgias  Negative for gait problem  Skin: Negative for color change  Neurological: Negative for dizziness and headaches  Hematological: Does not bruise/bleed easily  Psychiatric/Behavioral: Negative for confusion and sleep disturbance  The patient is nervous/anxious  Objective:    Vitals:    02/03/22 0947   Pulse: 99   SpO2: 94%       Physical Exam  Vitals reviewed  Constitutional:       General: She is not in acute distress  Appearance: She is well-developed and well-groomed  HENT:      Head: Normocephalic and atraumatic  Eyes:      General: Lids are normal    Pulmonary:      Effort: No tachypnea or respiratory distress  Comments: Frequent coughing  Skin:     Coloration: Skin is not pale  Neurological:      Mental Status: She is alert and oriented to person, place, and time  Psychiatric:         Mood and Affect: Affect is tearful  Speech: Speech normal          Behavior: Behavior is cooperative               Transitional Care Management Review:  Mary Palmer is a 61 y o  female here for TCM follow up  During the TCM phone call patient stated:    TCM Call (since 1/3/2022)     Date and time call was made  1/31/2022  3:08 PM    Hospital care reviewed  Records reviewed        Patient was hospitialized at  [address]        Date of Admission  01/22/22    Date of discharge  01/26/22    Diagnosis  Pneumonia due to COVID-19 virus    Disposition  Home    Were the patients medications reviewed and updated  Yes    Current Symptoms  Cough; Shortness of breath; Fatigue    Cough Severity  Severe    Shortness of breath severity  Moderate    Fatigue severity  Severe      TCM Call (since 1/3/2022)     Post hospital issues  Reduced activity; Poor ADL (Activities of Daily Living) performance    Should patient be enrolled in anticoag monitoring? No    Scheduled for follow up? Yes    Did you obtain your prescribed medications  Yes    Do you need help managing your prescriptions or medications  No    Is transportation to your appointment needed  No    I have advised the patient to call PCP with any new or worsening symptoms  [address] or [employer] other    Support System  Spouse    The type of support provided  Emotional; Financial; Physical    Do you have social support  Yes, as much as I need    Are you recieving any outpatient services  No    Are you recieving home care services  No    Are you using any community resources  No    Current waiver services  No    Have you fallen in the last 12 months  No    Interperter language line needed  No    Counseling  Patient    Counseling topics  Activities of daily living    Comments  Pt stated she did not qualify for home O2  Stated she gets dizzy, and feels like she got run over by a truck  I spent 25 minutes with the patient during this visit  Ananya Gonzalez MD      VIRTUAL VISIT DISCLAIMER    Radha Menon verbally agrees to participate in [employer]   Pt is aware that [employer] could be limited without vital signs or the ability to perform a full hands-on physical Lulu Javed understands she or the provider may request at any time to terminate the video visit and request the patient to seek care or treatment in person

## 2022-02-22 ENCOUNTER — RA CDI HCC (OUTPATIENT)
Dept: OTHER | Facility: HOSPITAL | Age: 57
End: 2022-02-22

## 2022-02-22 NOTE — PROGRESS NOTES
[facility] [address]  coding opportunities             Chart Reviewed * (Number of) Inbasket suggestions sent to Provider: 1                  Patients insurance company: [employer] (Medicare Advantage and Commercial)           Appt on 3/1/22    Found in active problem list - please review and assess if applicable for 5403    O25 25: Morbid (severe) obesity due to excess calories ([facility] ) - Per CMS/ICD 10 coding guidelines, to be used when BMI >=40

## 2022-03-01 ENCOUNTER — OFFICE VISIT (OUTPATIENT)
Dept: INTERNAL MEDICINE CLINIC | Facility: CLINIC | Age: 57
End: 2022-03-01
Payer: COMMERCIAL

## 2022-03-01 VITALS
OXYGEN SATURATION: 97 % | DIASTOLIC BLOOD PRESSURE: 70 MMHG | SYSTOLIC BLOOD PRESSURE: 122 MMHG | HEART RATE: 70 BPM | BODY MASS INDEX: 45.04 KG/M2 | WEIGHT: 287 LBS | HEIGHT: 67 IN | TEMPERATURE: 97.6 F

## 2022-03-01 DIAGNOSIS — I48.0 PAROXYSMAL ATRIAL FIBRILLATION (HCC): ICD-10-CM

## 2022-03-01 DIAGNOSIS — U07.1 PNEUMONIA DUE TO COVID-19 VIRUS: Primary | ICD-10-CM

## 2022-03-01 DIAGNOSIS — I26.99 BILATERAL PULMONARY EMBOLISM (HCC): ICD-10-CM

## 2022-03-01 DIAGNOSIS — J12.82 PNEUMONIA DUE TO COVID-19 VIRUS: Primary | ICD-10-CM

## 2022-03-01 DIAGNOSIS — E03.9 HYPOTHYROIDISM, UNSPECIFIED TYPE: ICD-10-CM

## 2022-03-01 PROCEDURE — 99214 OFFICE O/P EST MOD 30 MIN: CPT | Performed by: FAMILY MEDICINE

## 2022-03-02 NOTE — PROGRESS NOTES
Assessment/Plan:    No problem-specific Assessment & Plan notes found for this encounter  Diagnoses and all orders for this visit:    Pneumonia due to COVID-19 virus  -     XR chest pa & lateral; Future    Hypothyroidism, unspecified type    Paroxysmal atrial fibrillation (HCC)  -     Holter monitor; Future    Bilateral pulmonary embolism (HCC)  -     apixaban (Eliquis) 5 mg; Take 1 tablet (5 mg total) by mouth 2 (two) times a day      orders recommendations as noted above  Discussed with her that it will likely take her another 4-6 weeks to heal completely  She does have her relatively physical job and discussed with her that would recommend waiting at least another 3-4 weeks to return  Will have her follow up in about 3-4 weeks and review testing that is being ordered  She has had some pleuritic chest pain which can be expected with the pneumonia and pulmonary emboli  Deep breathing exercises recommended  Gradually increase activity level  Watch for any increase in palpitations  Will get Holter monitor for further investigation especially with her history of the paroxysmal atrial fibrillation preceding this most recent illness  Her symptoms do seem consistent with this  Discussed with her the importance of staying anticoagulated with the Eliquis  Discussed that the Eliquis is supposed to be twice a day and not once a day  New prescription will be sent  Stay adequately hydrated  Will check follow-up chest x-ray especially with the coarse breath sounds on the right side  Would expect there to be some residual abnormalities at this point, however  Watch for any worsening of symptoms  Would consider referral to Cardiology if symptoms persist or worsen  Her father passed away yesterday at home with hospice  Discussed the grieving process with her  Will have her follow up in about 3-4 weeks or sooner if needed  FMLA forms will be completed        Subjective:      Patient ID: Tayla Man is a 61 y o  female  She presents for follow-up  She continues to feel fatigued and has some shortness of breath with exertion  Has noticed some pleuritic chest pain with deep breathing and movement  Has been having intermittent episodes of palpitations which can occur at rest or with activity  She does have a history preceding the coronavirus of the paroxysmal atrial fibrillation  She is concerned that this may be recurrent  Has been taking Eliquis but has only been taking this once a day  She now understands that she needs to be taking this twice daily  Appetite is slowly improving  Has been under a lot of stress over the last few weeks with her father being on hospice and passing away yesterday morning  Is concerned about returning to work with her current symptoms especially the palpitations and shortness of breath  She does do a relatively physical job and is concerned that she will likely be unable to complete this safely  The following portions of the patient's history were reviewed and updated as appropriate:   She  has a past medical history of Arthritis, Cancer ([address] ), Endometrial adenocarcinoma ([facility] ), Endometriosis, Facial twitching, Migraine, Obesity, and Thyroid disease    She   Patient Active Problem List    Diagnosis Date Noted    Pneumonia due to COVID-19 virus 01/22/2022    Bilateral pulmonary embolism ([facility] [address] ) 01/22/2022    Hypoxia 01/22/2022    Mixed hyperlipidemia 07/29/2021    Vitamin D deficiency 03/29/2021    S/P total abdominal hysterectomy and bilateral salpingo-oophorectomy 03/29/2021    Rotator cuff arthropathy of right shoulder 02/23/2021    Rotator cuff tear arthropathy of right shoulder 02/23/2021    Left ankle pain 10/29/2020    Anxiety 10/29/2020    Grieving 10/29/2020    Osteoarthritis of multiple joints 10/29/2020    Traumatic complete tear of left rotator cuff 06/03/2020    Chronic left shoulder pain 06/03/2020    History of endometrial cancer 06/06/2019  Chronic midline low back pain without sciatica 06/20/2018    Nasal congestion 07/06/2016    Degenerative arthritis of cervical spine 06/06/2016    Lung nodule 06/06/2016    Morbid obesity ([facility] ) 05/29/2016    Pulmonary nodule 05/29/2016    Hyperesthesia 07/30/2015    Migraine headache 07/30/2015    Obstructive sleep apnea 07/30/2015    Frequent headaches 10/28/2014    Hypothyroidism 10/28/2014    Paresthesias 10/28/2014    Paroxysmal atrial fibrillation ([facility] ) 10/28/2014     She  has a past surgical history that includes Ankle fracture surgery (Left); Hysterectomy; Cholecystectomy; Appendectomy; Ankle surgery; Hysteroscopy; Oophorectomy; pr knee scope,med/lat menisectomy (Left, 6/3/2019); Fracture surgery (Left); Knee arthroscopy (Left); and pr shldr arthroscop,surg,w/rotat cuff repr (Left, 9/23/2020)  Her family history includes Bone cancer in her maternal aunt; Breast cancer additional onset in her maternal aunt; Cancer in her father; Clotting disorder in her sister; Colon cancer in her paternal grandmother; Diabetes in her father; Hypertension in her father; Hypotension in her family; Liver cancer in her paternal grandmother; No Known Problems in her daughter, daughter, maternal grandfather, maternal grandmother, and paternal aunt  She  reports that she has never smoked  She has never used smokeless tobacco  She reports current alcohol use  She reports that she does not use drugs    Current Outpatient Medications   Medication Sig Dispense Refill    albuterol (PROVENTIL HFA,VENTOLIN HFA) 90 mcg/act inhaler Inhale 2 puffs every 6 (six) hours as needed for wheezing        apixaban (Eliquis) 5 mg Take 1 tablet (5 mg total) by mouth 2 (two) times a day 60 tablet 3    KRILL OIL PO Take 1 tablet by mouth daily      LORazepam (ATIVAN) 0 5 mg tablet Take 1 tablet (0 5 mg total) by mouth 2 (two) times a day as needed for anxiety 60 tablet 1    Multiple Vitamin (MULTIVITAMIN) tablet Take 1 tablet by mouth daily      Omega-3 Fatty Acids (FISH OIL) 1,000 mg Take 1,000 mg by mouth daily   Vitamin D, Cholecalciferol, 1000 UNITS TABS Take by mouth       No current facility-administered medications for this visit  Current Outpatient Medications on File Prior to Visit   Medication Sig    albuterol (PROVENTIL HFA,VENTOLIN HFA) 90 mcg/act inhaler Inhale 2 puffs every 6 (six) hours as needed for wheezing      KRILL OIL PO Take 1 tablet by mouth daily    LORazepam (ATIVAN) 0 5 mg tablet Take 1 tablet (0 5 mg total) by mouth 2 (two) times a day as needed for anxiety    Multiple Vitamin (MULTIVITAMIN) tablet Take 1 tablet by mouth daily    Omega-3 Fatty Acids (FISH OIL) 1,000 mg Take 1,000 mg by mouth daily   Vitamin D, Cholecalciferol, 1000 UNITS TABS Take by mouth    [DISCONTINUED] albuterol (ProAir HFA) 90 mcg/act inhaler Inhale 2 puffs every 6 (six) hours as needed for wheezing    [DISCONTINUED] apixaban (Eliquis) 5 mg Take 2 tablets (10 mg total) by mouth 2 (two) times a day for 7 days, THEN 1 tablet (5 mg total) 2 (two) times a day  No current facility-administered medications on file prior to visit  She has No Known Allergies       Review of Systems   Constitutional: Positive for activity change and fatigue  Negative for appetite change, chills and fever  HENT: Negative for congestion and rhinorrhea  Eyes: Negative for visual disturbance  Respiratory: Positive for shortness of breath  Negative for cough and chest tightness  Cardiovascular: Positive for chest pain and palpitations  Gastrointestinal: Negative for abdominal pain, blood in stool, diarrhea, nausea and vomiting  Endocrine: Negative for polydipsia, polyphagia and polyuria  Genitourinary: Negative for dysuria, frequency and urgency  Musculoskeletal: Negative for gait problem  Skin: Negative for color change  Neurological: Negative for dizziness and headaches  Hematological: Does not bruise/bleed easily  Psychiatric/Behavioral: Negative for confusion and sleep disturbance  The patient is not nervous/anxious  Objective:      /70 (BP Location: Left arm, Patient Position: Sitting, Cuff Size: Large)   Pulse 70   Temp 97 6 °F (36 4 °C)   Ht 5' 7" (1 702 m)   Wt 130 kg (287 lb)   SpO2 97%   BMI 44 95 kg/m²          Physical Exam  Vitals and nursing note reviewed  Constitutional:       General: She is not in acute distress  Appearance: She is well-developed and well-groomed  Comments: Tired appearing   HENT:      Head: Normocephalic and atraumatic  Eyes:      General:         Right eye: No discharge  Left eye: No discharge  Conjunctiva/sclera: Conjunctivae normal       Pupils: Pupils are equal, round, and reactive to light  Cardiovascular:      Rate and Rhythm: Normal rate and regular rhythm  Heart sounds: Normal heart sounds  No murmur heard  No friction rub  No gallop  Pulmonary:      Effort: No respiratory distress  Breath sounds: No decreased breath sounds, wheezing or rales  Comments: Coarse breath sounds right greater than left  Abdominal:      General: Bowel sounds are normal  There is no distension  Tenderness: There is no abdominal tenderness  Lymphadenopathy:      Cervical: No cervical adenopathy  Skin:     General: Skin is warm and dry  Neurological:      Mental Status: She is alert and oriented to person, place, and time  Psychiatric:         Mood and Affect: Mood normal  Affect is tearful  Speech: Speech normal          Behavior: Behavior normal  Behavior is cooperative  Comments: Grieving the recent death of her father yesterday         BMI Counseling: Body mass index is 44 95 kg/m²   The BMI is above normal  Nutrition recommendations include encouraging healthy choices of fruits and vegetables, consuming healthier snacks, limiting drinks that contain sugar, moderation in carbohydrate intake and reducing intake of cholesterol  Rationale for BMI follow-up plan is due to patient being overweight or obese

## 2022-03-10 ENCOUNTER — HOSPITAL ENCOUNTER (OUTPATIENT)
Dept: NON INVASIVE DIAGNOSTICS | Facility: HOSPITAL | Age: 57
Discharge: HOME | End: 2022-03-10
Payer: COMMERCIAL

## 2022-03-10 DIAGNOSIS — I48.0 PAROXYSMAL ATRIAL FIBRILLATION (HCC): ICD-10-CM

## 2022-03-10 PROCEDURE — 93225 XTRNL ECG REC<48 HRS REC: CPT

## 2022-03-10 PROCEDURE — 93226 XTRNL ECG REC<48 HR SCAN A/R: CPT

## 2022-03-16 ENCOUNTER — RA CDI HCC (OUTPATIENT)
Dept: OTHER | Facility: HOSPITAL | Age: 57
End: 2022-03-16

## 2022-03-16 NOTE — PROGRESS NOTES
[facility]  coding opportunities          Chart Reviewed number of suggestions sent to Provider: 1     Patients Insurance        [facility]  on 3/22/22    Found in active problem list - please review and assess if applicable for 3311    D00 39: Morbid (severe) obesity due to excess calories ([facility] ) - Per CMS/ICD 10 coding guidelines, to be used when BMI >=40

## 2022-03-21 ENCOUNTER — APPOINTMENT (OUTPATIENT)
Dept: RADIOLOGY | Facility: CLINIC | Age: 57
End: 2022-03-21
Payer: COMMERCIAL

## 2022-03-21 DIAGNOSIS — J12.82 PNEUMONIA DUE TO COVID-19 VIRUS: ICD-10-CM

## 2022-03-21 DIAGNOSIS — U07.1 PNEUMONIA DUE TO COVID-19 VIRUS: ICD-10-CM

## 2022-03-21 PROCEDURE — 71046 X-RAY EXAM CHEST 2 VIEWS: CPT

## 2022-03-23 ENCOUNTER — OFFICE VISIT (OUTPATIENT)
Dept: INTERNAL MEDICINE CLINIC | Facility: CLINIC | Age: 57
End: 2022-03-23
Payer: COMMERCIAL

## 2022-03-23 VITALS
BODY MASS INDEX: 44.53 KG/M2 | OXYGEN SATURATION: 95 % | HEART RATE: 90 BPM | SYSTOLIC BLOOD PRESSURE: 124 MMHG | WEIGHT: 283.7 LBS | HEIGHT: 67 IN | DIASTOLIC BLOOD PRESSURE: 80 MMHG | TEMPERATURE: 97.7 F

## 2022-03-23 DIAGNOSIS — Z78.0 POSTMENOPAUSAL: ICD-10-CM

## 2022-03-23 DIAGNOSIS — R09.02 HYPOXIA: ICD-10-CM

## 2022-03-23 DIAGNOSIS — M15.9 PRIMARY OSTEOARTHRITIS INVOLVING MULTIPLE JOINTS: ICD-10-CM

## 2022-03-23 DIAGNOSIS — I48.0 PAROXYSMAL ATRIAL FIBRILLATION (HCC): ICD-10-CM

## 2022-03-23 DIAGNOSIS — I26.99 BILATERAL PULMONARY EMBOLISM (HCC): Primary | ICD-10-CM

## 2022-03-23 DIAGNOSIS — Z12.31 VISIT FOR SCREENING MAMMOGRAM: ICD-10-CM

## 2022-03-23 PROBLEM — U07.1 PNEUMONIA DUE TO COVID-19 VIRUS: Status: RESOLVED | Noted: 2022-01-22 | Resolved: 2022-03-23

## 2022-03-23 PROBLEM — J12.82 PNEUMONIA DUE TO COVID-19 VIRUS: Status: RESOLVED | Noted: 2022-01-22 | Resolved: 2022-03-23

## 2022-03-23 PROCEDURE — 1036F TOBACCO NON-USER: CPT | Performed by: FAMILY MEDICINE

## 2022-03-23 PROCEDURE — 99214 OFFICE O/P EST MOD 30 MIN: CPT | Performed by: FAMILY MEDICINE

## 2022-03-23 PROCEDURE — 93227 XTRNL ECG REC<48 HR R&I: CPT | Performed by: INTERNAL MEDICINE

## 2022-03-23 PROCEDURE — 3008F BODY MASS INDEX DOCD: CPT | Performed by: FAMILY MEDICINE

## 2022-03-23 RX ORDER — TRAMADOL HYDROCHLORIDE 50 MG/1
50 TABLET ORAL 2 TIMES DAILY
Qty: 30 TABLET | Refills: 1 | Status: SHIPPED | OUTPATIENT
Start: 2022-03-23

## 2022-03-24 NOTE — PROGRESS NOTES
Assessment/Plan:    No problem-specific Assessment & Plan notes found for this encounter  Diagnoses and all orders for this visit:    Bilateral pulmonary embolism (HCC)    Paroxysmal atrial fibrillation (HCC)    Hypoxia    Primary osteoarthritis involving multiple joints  -     traMADol (ULTRAM) 50 mg tablet; Take 1 tablet (50 mg total) by mouth 2 (two) times a day    Visit for screening mammogram  -     Mammo screening bilateral w 3d & cad; Future    Postmenopausal  -     DXA bone density spine hip and pelvis; Future      orders recommendations as noted above  She did have Holter monitor and chest x-ray completed but not yet read  We will contact her with the results  She is gradually improving after coronavirus pneumonia as well as the bilateral pulmonary emboli  Continue with the Eliquis for at least 3 months  Watch for any increasing shortness of breath  Watch for any worsening of the vertigo symptoms  Discussed staying well hydrated  Change positions slowly  Hair loss after significant coronavirus infection discussed  This usually gradually improves over a period of about 4-6 months  Significant joint symptoms discussed  Needs to avoid anti-inflammatories while on the Eliquis  Short-term script for the tramadol given  PDMP reviewed  Risks and benefits of the coronavirus vaccination discussed  She has to be immunized prior to returning to work  Her concerns were discussed with her  She likely will proceed with the Jonny Jeremiah vaccination in order to retain her job  Her return to work date is in approximately 3 weeks  Watch for any worsening  Contact the office if symptoms worsen or persist       Subjective:      Patient ID: Brittany Sherman is a 61 y o  female  She presents for follow-up  Breathing has improved  Stamina has improved overall  Still fatigue somewhat more easily  She has noted some vertigo symptoms especially at times when laying in bed or moving in bed    Has also noted some hair loss after the coronavirus  She is very frustrated that her work is requiring the coronavirus vaccination and is upset that she will lose her job if she is not vaccinated  She has concerns regarding potential side effects and does not want the vaccination at all despite having severe coronavirus  In order to retain her job, she will likely proceed with the Rashaun Cerdaur vaccination  Denies any significant cough  Has more anxiety symptoms especially associated with concerns for her job and her family  Continues with the Eliquis  Denies any significant bruising or bleeding with this  The following portions of the patient's history were reviewed and updated as appropriate:   She  has a past medical history of Arthritis, Cancer ([facility] ), Endometrial adenocarcinoma ([facility] ), Endometriosis, Facial twitching, Migraine, Obesity, Pneumonia due to COVID-19 virus (1/22/2022), and Thyroid disease    She   Patient Active Problem List    Diagnosis Date Noted    Bilateral pulmonary embolism ([facility] ) 01/22/2022    Hypoxia 01/22/2022    Mixed hyperlipidemia 07/29/2021    Vitamin D deficiency 03/29/2021    S/P total abdominal hysterectomy and bilateral salpingo-oophorectomy 03/29/2021    Rotator cuff arthropathy of right shoulder 02/23/2021    Rotator cuff tear arthropathy of right shoulder 02/23/2021    Left ankle pain 10/29/2020    Anxiety 10/29/2020    Grieving 10/29/2020    Osteoarthritis of multiple joints 10/29/2020    Traumatic complete tear of left rotator cuff 06/03/2020    Chronic left shoulder pain 06/03/2020    History of endometrial cancer 06/06/2019    Chronic midline low back pain without sciatica 06/20/2018    Nasal congestion 07/06/2016    Degenerative arthritis of cervical spine 06/06/2016    Lung nodule 06/06/2016    Morbid obesity ([address] ) 05/29/2016    Pulmonary nodule 05/29/2016    Hyperesthesia 07/30/2015    Migraine headache 07/30/2015    Obstructive sleep apnea 07/30/2015    Frequent headaches 10/28/2014    Hypothyroidism 10/28/2014    Paresthesias 10/28/2014    Paroxysmal atrial fibrillation ([facility] ) 10/28/2014     She  has a past surgical history that includes Ankle fracture surgery (Left); Hysterectomy; Cholecystectomy; Appendectomy; Ankle surgery; Hysteroscopy; Oophorectomy; pr knee scope,med/lat menisectomy (Left, 6/3/2019); Fracture surgery (Left); Knee arthroscopy (Left); and pr shldr arthroscop,surg,w/rotat cuff repr (Left, 9/23/2020)  Her family history includes Bone cancer in her maternal aunt; Breast cancer additional onset in her maternal aunt; Cancer in her father; Clotting disorder in her sister; Colon cancer in her paternal grandmother; Diabetes in her father; Hypertension in her father; Hypotension in her family; Liver cancer in her paternal grandmother; No Known Problems in her daughter, daughter, maternal grandfather, maternal grandmother, and paternal aunt  She  reports that she has never smoked  She has never used smokeless tobacco  She reports current alcohol use  She reports that she does not use drugs  Current Outpatient Medications   Medication Sig Dispense Refill    albuterol (PROVENTIL HFA,VENTOLIN HFA) 90 mcg/act inhaler Inhale 2 puffs every 6 (six) hours as needed for wheezing        apixaban (Eliquis) 5 mg Take 1 tablet (5 mg total) by mouth 2 (two) times a day 60 tablet 3    KRILL OIL PO Take 1 tablet by mouth daily      Multiple Vitamin (MULTIVITAMIN) tablet Take 1 tablet by mouth daily      Omega-3 Fatty Acids (FISH OIL) 1,000 mg Take 1,000 mg by mouth daily   Vitamin D, Cholecalciferol, 1000 UNITS TABS Take by mouth      traMADol (ULTRAM) 50 mg tablet Take 1 tablet (50 mg total) by mouth 2 (two) times a day 30 tablet 1     No current facility-administered medications for this visit       Current Outpatient Medications on File Prior to Visit   Medication Sig    albuterol (PROVENTIL HFA,VENTOLIN HFA) 90 mcg/act inhaler Inhale 2 puffs every 6 (six) hours as needed for wheezing      apixaban (Eliquis) 5 mg Take 1 tablet (5 mg total) by mouth 2 (two) times a day    KRILL OIL PO Take 1 tablet by mouth daily    Multiple Vitamin (MULTIVITAMIN) tablet Take 1 tablet by mouth daily    Omega-3 Fatty Acids (FISH OIL) 1,000 mg Take 1,000 mg by mouth daily   Vitamin D, Cholecalciferol, 1000 UNITS TABS Take by mouth    [DISCONTINUED] LORazepam (ATIVAN) 0 5 mg tablet Take 1 tablet (0 5 mg total) by mouth 2 (two) times a day as needed for anxiety     No current facility-administered medications on file prior to visit  She has No Known Allergies       Review of Systems   Constitutional: Positive for fatigue  Negative for activity change, appetite change, chills and fever  HENT: Negative for congestion and rhinorrhea  Eyes: Negative for visual disturbance  Respiratory: Negative for cough, chest tightness and shortness of breath  Cardiovascular: Negative for chest pain and palpitations  Gastrointestinal: Negative for abdominal pain, blood in stool, diarrhea, nausea and vomiting  Endocrine: Negative for polydipsia, polyphagia and polyuria  Genitourinary: Negative for dysuria, frequency and urgency  Musculoskeletal: Positive for arthralgias  Negative for gait problem  Skin: Negative for color change  Hair loss   Neurological: Positive for dizziness  Negative for headaches  Hematological: Does not bruise/bleed easily  Psychiatric/Behavioral: Negative for confusion, decreased concentration and sleep disturbance  The patient is not nervous/anxious  Objective:      /80 (BP Location: Left arm, Patient Position: Sitting, Cuff Size: Large)   Pulse 90   Temp 97 7 °F (36 5 °C)   Ht 5' 7" (1 702 m)   Wt 129 kg (283 lb 11 2 oz)   SpO2 95%   BMI 44 43 kg/m²          Physical Exam  Vitals and nursing note reviewed  Constitutional:       General: She is not in acute distress       Appearance: She is well-developed and well-groomed  HENT:      Head: Normocephalic and atraumatic  Eyes:      General:         Right eye: No discharge  Left eye: No discharge  Conjunctiva/sclera: Conjunctivae normal       Pupils: Pupils are equal, round, and reactive to light  Cardiovascular:      Rate and Rhythm: Normal rate and regular rhythm  Heart sounds: Normal heart sounds  No murmur heard  No friction rub  No gallop  Pulmonary:      Effort: No respiratory distress  Breath sounds: No wheezing or rales  Abdominal:      General: Bowel sounds are normal  There is no distension  Tenderness: There is no abdominal tenderness  Lymphadenopathy:      Cervical: No cervical adenopathy  Skin:     General: Skin is warm and dry  Neurological:      Mental Status: She is alert and oriented to person, place, and time  Psychiatric:         Mood and Affect: Mood and affect normal          Speech: Speech normal          Behavior: Behavior normal  Behavior is cooperative           Cognition and Memory: Cognition and memory normal

## 2022-03-30 ENCOUNTER — TELEPHONE (OUTPATIENT)
Dept: INTERNAL MEDICINE CLINIC | Facility: CLINIC | Age: 57
End: 2022-03-30

## 2022-03-30 NOTE — TELEPHONE ENCOUNTER
Magali called asking how long does she need to be on the Eliquis  Stated she needs to be on it when she gets the shot (Covid?)  Stated she will staty out to the end of April  She stated you can just change the date on the FMLA that we just faxed a few weeks ago  I can find it for the notation

## 2022-06-13 ENCOUNTER — VBI (OUTPATIENT)
Dept: ADMINISTRATIVE | Facility: OTHER | Age: 57
End: 2022-06-13

## 2025-01-21 NOTE — ASSESSMENT & PLAN NOTE
Check updated CBC.        · Switched to Eliquis at the time of discharge   did the price check  · Echo showed  Left ventricular cavity size is normal  The left ventricular ejection fraction is 60%  Systolic function is normal  Wall motion is normal  Diastolic function is normal for age  · Right Ventricle: Right ventricular cavity size is mildly dilated   Systolic function is low normal   · Venous duplex showed no evidence of DVT  · Patient is stable to be discharged today

## (undated) DEVICE — SYRINGE 50ML LL

## (undated) DEVICE — CHLORAPREP HI-LITE 26ML ORANGE

## (undated) DEVICE — VIAL DECANTER

## (undated) DEVICE — ARTHROSCOPY FLOOR MAT

## (undated) DEVICE — NEEDLE 21 G X 1

## (undated) DEVICE — NEEDLE HYPO 22G X 1-1/2 IN

## (undated) DEVICE — COBAN 4 IN STERILE

## (undated) DEVICE — U-DRAPE: Brand: CONVERTORS

## (undated) DEVICE — BLADE SHAVER EXCALIBUR 4MM 13CM COOLCUT

## (undated) DEVICE — SUT ETHILON 3-0 PS-1 18 IN 1663H

## (undated) DEVICE — IMMOBILIZER KNEE UNIVERSAL 19 IN

## (undated) DEVICE — 3000CC GUARDIAN II: Brand: GUARDIAN

## (undated) DEVICE — PLUMEPEN PRO 10FT

## (undated) DEVICE — 1016 S-DRAPE IRRIG POUCH 10/BOX: Brand: STERI-DRAPE™

## (undated) DEVICE — ABDOMINAL PAD: Brand: DERMACEA

## (undated) DEVICE — INTENDED FOR TISSUE SEPARATION, AND OTHER PROCEDURES THAT REQUIRE A SHARP SURGICAL BLADE TO PUNCTURE OR CUT.: Brand: BARD-PARKER ® CARBON RIB-BACK BLADES

## (undated) DEVICE — SPONGE LAP 18 X 18 IN

## (undated) DEVICE — ACE WRAP 6 IN STERILE

## (undated) DEVICE — SKIN MARKER DUAL TIP WITH RULER CAP, FLEXIBLE RULER AND LABELS: Brand: DEVON

## (undated) DEVICE — DRAPE SHEET X-LG

## (undated) DEVICE — TIBURON SPLIT SHEET: Brand: CONVERTORS

## (undated) DEVICE — TUBING SUCTION 5MM X 12 FT

## (undated) DEVICE — GLOVE INDICATOR PI UNDERGLOVE SZ 7.5 BLUE

## (undated) DEVICE — 3M™ STERI-STRIP™ REINFORCED ADHESIVE SKIN CLOSURES, R1547, 1/2 IN X 4 IN (12 MM X 100 MM), 6 STRIPS/ENVELOPE: Brand: 3M™ STERI-STRIP™

## (undated) DEVICE — GLOVE INDICATOR PI UNDERGLOVE SZ 8 BLUE

## (undated) DEVICE — T-MAX DISPOSABLE FACE MASK 8 PER BOX

## (undated) DEVICE — IMPERVIOUS STOCKINETTE: Brand: DEROYAL

## (undated) DEVICE — BANDAGE, ESMARK LF STR 6"X9' (20/CS): Brand: CYPRESS

## (undated) DEVICE — SURGI KIT INSTRUMENT ORGANIZER

## (undated) DEVICE — GAUZE SPONGES,16 PLY: Brand: CURITY

## (undated) DEVICE — 10FR FRAZIER SUCTION HANDLE: Brand: CARDINAL HEALTH

## (undated) DEVICE — TUBING ARTHROSCOPIC WAVE  MAIN PUMP

## (undated) DEVICE — TRANSPOSAL ULTRAFLEX DUO/QUAD ULTRA CART MANIFOLD

## (undated) DEVICE — CLEAR-TRAC THREADED CANNULA WITH                                    OBTURATOR 5 MM X 76 MM, LATEX FREE,                                    BOX OF 10: Brand: CLEAR-TRAC

## (undated) DEVICE — WEBRIL 6 IN UNSTERILE

## (undated) DEVICE — STERI DRAPE 1000 NON-STERILE ROLL

## (undated) DEVICE — NEEDLE 18 G X 1 1/2

## (undated) DEVICE — GLOVE SRG BIOGEL ORTHOPEDIC 7

## (undated) DEVICE — BURR  OVAL 4MM 13CM 8 FLUTE COOLCUT

## (undated) DEVICE — BETHLEHEM UNIVERSAL  MIONR EXT: Brand: CARDINAL HEALTH

## (undated) DEVICE — FIRSTPASS ST SUTURE PASSER, SELF CAPTURE: Brand: FIRSTPASS

## (undated) DEVICE — INTENT TO BE USED WITH SUTURE MATERIAL FOR TISSUE CLOSURE: Brand: RICHARD-ALLAN®  NEEDLE 1/2 CIRCLE REVERSE CUTTING

## (undated) DEVICE — SPONGE LAP 18 X 18 IN STRL RFD

## (undated) DEVICE — GLOVE SRG BIOGEL ECLIPSE 8

## (undated) DEVICE — VAPR COOLPULSE 90 ELECTRODE 90 DEGREES SUCTION WITH INTEGRATED HANDPIECE: Brand: VAPR COOLPULSE

## (undated) DEVICE — 3M™ IOBAN™ 2 ANTIMICROBIAL INCISE DRAPE 6648EZ: Brand: IOBAN™ 2

## (undated) DEVICE — CHLORAPREP HI-LITE 10.5ML ORANGE

## (undated) DEVICE — MAT FLOOR STEP DRI 24 X 36 IN N-STRL

## (undated) DEVICE — DRAPE C-ARMOUR

## (undated) DEVICE — NEEDLE SPINAL18G X 3.5 IN QUINCKE

## (undated) DEVICE — SUT PASSER SMARTSTITCH WHITE DISP

## (undated) DEVICE — SUT FIBERWIRE #2 1/2 CIRCLE T-5 38IN AR-7200

## (undated) DEVICE — OCCLUSIVE GAUZE STRIP,3% BISMUTH TRIBROMOPHENATE IN PETROLATUM BLEND: Brand: XEROFORM

## (undated) DEVICE — CUFF TOURNIQUET 30 X 4 IN QUICK CONNECT DISP 1BLA

## (undated) DEVICE — SYRINGE 3ML LL

## (undated) DEVICE — ARTHROCARE WAND MULTIVAC 50 DEGREE

## (undated) DEVICE — THREE-QUARTER SHEET: Brand: CONVERTORS

## (undated) DEVICE — 3M™ STERI-DRAPE™ U-DRAPE 1015: Brand: STERI-DRAPE™

## (undated) DEVICE — 3M™ MICROFOAM™ SURGICAL TAPE 4 ROLLS/CARTON 6 CARTONS/CASE 1528-3: Brand: 3M™ MICROFOAM™

## (undated) DEVICE — PREP SURGICAL PURPREP 26ML

## (undated) DEVICE — SUT VICRYL 2-0 SH 27 IN UNDYED J417H

## (undated) DEVICE — ASTOUND STANDARD SURGICAL GOWN, XL: Brand: CONVERTORS

## (undated) DEVICE — GLOVE SRG BIOGEL 7

## (undated) DEVICE — PADDING CAST 6IN COTTON STRL

## (undated) DEVICE — FILTER STRAW 4 MICRON

## (undated) DEVICE — GLOVE SRG BIOGEL 8

## (undated) DEVICE — ACE WRAP 6 IN UNSTERILE

## (undated) DEVICE — SUT VICRYL 0 CT-1 36 IN J946H

## (undated) DEVICE — DRAPE C-ARM X-RAY

## (undated) DEVICE — MEDI-VAC YANKAUER SUCTION HANDLE W/STRAIGHT TIP & CONTROL VENT: Brand: CARDINAL HEALTH

## (undated) DEVICE — UTILITY MARKER,BLACK WITH LABELS: Brand: DEVON

## (undated) DEVICE — SYRINGE 20ML LL

## (undated) DEVICE — INTENDED FOR TISSUE SEPARATION, AND OTHER PROCEDURES THAT REQUIRE A SHARP SURGICAL BLADE TO PUNCTURE OR CUT.: Brand: BARD-PARKER SAFETY BLADES SIZE 15, STERILE

## (undated) DEVICE — ARTHOCARE SMART STITCH PERFECTPASSER

## (undated) DEVICE — CARTRIDGE SUT SMARTSTITCH MAGNUM WIRE

## (undated) DEVICE — ACE WRAP 4 IN STERILE

## (undated) DEVICE — SUT MONOCRYL 4-0 PS-2 27 IN Y426H